# Patient Record
Sex: FEMALE | Race: WHITE | NOT HISPANIC OR LATINO | URBAN - METROPOLITAN AREA
[De-identification: names, ages, dates, MRNs, and addresses within clinical notes are randomized per-mention and may not be internally consistent; named-entity substitution may affect disease eponyms.]

---

## 2014-02-18 RX ORDER — HYDRALAZINE HCL 50 MG
1 TABLET ORAL
Qty: 0 | Refills: 0 | COMMUNITY
Start: 2014-02-18

## 2015-09-03 RX ORDER — SERTRALINE 25 MG/1
1 TABLET, FILM COATED ORAL
Qty: 0 | Refills: 0 | COMMUNITY
Start: 2015-09-03

## 2018-02-13 ENCOUNTER — INPATIENT (INPATIENT)
Facility: HOSPITAL | Age: 83
LOS: 11 days | End: 2018-02-25
Attending: INTERNAL MEDICINE | Admitting: INTERNAL MEDICINE
Payer: MEDICARE

## 2018-02-13 VITALS
TEMPERATURE: 98 F | SYSTOLIC BLOOD PRESSURE: 208 MMHG | RESPIRATION RATE: 20 BRPM | OXYGEN SATURATION: 95 % | HEART RATE: 101 BPM | DIASTOLIC BLOOD PRESSURE: 53 MMHG

## 2018-02-13 DIAGNOSIS — I10 ESSENTIAL (PRIMARY) HYPERTENSION: ICD-10-CM

## 2018-02-13 DIAGNOSIS — E78.5 HYPERLIPIDEMIA, UNSPECIFIED: ICD-10-CM

## 2018-02-13 DIAGNOSIS — R06.09 OTHER FORMS OF DYSPNEA: ICD-10-CM

## 2018-02-13 DIAGNOSIS — R33.9 RETENTION OF URINE, UNSPECIFIED: ICD-10-CM

## 2018-02-13 DIAGNOSIS — I50.9 HEART FAILURE, UNSPECIFIED: ICD-10-CM

## 2018-02-13 DIAGNOSIS — Z29.9 ENCOUNTER FOR PROPHYLACTIC MEASURES, UNSPECIFIED: ICD-10-CM

## 2018-02-13 LAB
ALBUMIN SERPL ELPH-MCNC: 3.5 G/DL — SIGNIFICANT CHANGE UP (ref 3.3–5)
ALP SERPL-CCNC: 106 U/L — SIGNIFICANT CHANGE UP (ref 40–120)
ALT FLD-CCNC: 22 U/L — SIGNIFICANT CHANGE UP (ref 4–33)
APPEARANCE UR: CLEAR — SIGNIFICANT CHANGE UP
AST SERPL-CCNC: 19 U/L — SIGNIFICANT CHANGE UP (ref 4–32)
BACTERIA # UR AUTO: SIGNIFICANT CHANGE UP
BASOPHILS # BLD AUTO: 0.12 K/UL — SIGNIFICANT CHANGE UP (ref 0–0.2)
BASOPHILS NFR BLD AUTO: 1 % — SIGNIFICANT CHANGE UP (ref 0–2)
BILIRUB SERPL-MCNC: 0.6 MG/DL — SIGNIFICANT CHANGE UP (ref 0.2–1.2)
BILIRUB UR-MCNC: NEGATIVE — SIGNIFICANT CHANGE UP
BLOOD UR QL VISUAL: NEGATIVE — SIGNIFICANT CHANGE UP
BUN SERPL-MCNC: 16 MG/DL — SIGNIFICANT CHANGE UP (ref 7–23)
CALCIUM SERPL-MCNC: 9 MG/DL — SIGNIFICANT CHANGE UP (ref 8.4–10.5)
CHLORIDE SERPL-SCNC: 108 MMOL/L — HIGH (ref 98–107)
CK MB BLD-MCNC: 1.14 NG/ML — SIGNIFICANT CHANGE UP (ref 1–4.7)
CK MB BLD-MCNC: SIGNIFICANT CHANGE UP (ref 0–2.5)
CK SERPL-CCNC: 21 U/L — LOW (ref 25–170)
CO2 SERPL-SCNC: 22 MMOL/L — SIGNIFICANT CHANGE UP (ref 22–31)
COLOR SPEC: SIGNIFICANT CHANGE UP
CREAT SERPL-MCNC: 1.04 MG/DL — SIGNIFICANT CHANGE UP (ref 0.5–1.3)
EOSINOPHIL # BLD AUTO: 0.13 K/UL — SIGNIFICANT CHANGE UP (ref 0–0.5)
EOSINOPHIL NFR BLD AUTO: 1.1 % — SIGNIFICANT CHANGE UP (ref 0–6)
GLUCOSE SERPL-MCNC: 135 MG/DL — HIGH (ref 70–99)
GLUCOSE UR-MCNC: NEGATIVE — SIGNIFICANT CHANGE UP
HCT VFR BLD CALC: 28.2 % — LOW (ref 34.5–45)
HGB BLD-MCNC: 9.7 G/DL — LOW (ref 11.5–15.5)
IMM GRANULOCYTES # BLD AUTO: 0.07 # — SIGNIFICANT CHANGE UP
IMM GRANULOCYTES NFR BLD AUTO: 0.6 % — SIGNIFICANT CHANGE UP (ref 0–1.5)
KETONES UR-MCNC: NEGATIVE — SIGNIFICANT CHANGE UP
LEUKOCYTE ESTERASE UR-ACNC: NEGATIVE — SIGNIFICANT CHANGE UP
LYMPHOCYTES # BLD AUTO: 0.81 K/UL — LOW (ref 1–3.3)
LYMPHOCYTES # BLD AUTO: 7 % — LOW (ref 13–44)
MCHC RBC-ENTMCNC: 32.3 PG — SIGNIFICANT CHANGE UP (ref 27–34)
MCHC RBC-ENTMCNC: 34.4 % — SIGNIFICANT CHANGE UP (ref 32–36)
MCV RBC AUTO: 94 FL — SIGNIFICANT CHANGE UP (ref 80–100)
MONOCYTES # BLD AUTO: 0.92 K/UL — HIGH (ref 0–0.9)
MONOCYTES NFR BLD AUTO: 8 % — SIGNIFICANT CHANGE UP (ref 2–14)
MUCOUS THREADS # UR AUTO: SIGNIFICANT CHANGE UP
NEUTROPHILS # BLD AUTO: 9.52 K/UL — HIGH (ref 1.8–7.4)
NEUTROPHILS NFR BLD AUTO: 82.3 % — HIGH (ref 43–77)
NITRITE UR-MCNC: NEGATIVE — SIGNIFICANT CHANGE UP
NRBC # FLD: 0 — SIGNIFICANT CHANGE UP
NT-PROBNP SERPL-SCNC: 4838 PG/ML — SIGNIFICANT CHANGE UP
PH UR: 6.5 — SIGNIFICANT CHANGE UP (ref 4.6–8)
PLATELET # BLD AUTO: 299 K/UL — SIGNIFICANT CHANGE UP (ref 150–400)
PMV BLD: 10.3 FL — SIGNIFICANT CHANGE UP (ref 7–13)
POTASSIUM SERPL-MCNC: 4.3 MMOL/L — SIGNIFICANT CHANGE UP (ref 3.5–5.3)
POTASSIUM SERPL-SCNC: 4.3 MMOL/L — SIGNIFICANT CHANGE UP (ref 3.5–5.3)
PROT SERPL-MCNC: 6.6 G/DL — SIGNIFICANT CHANGE UP (ref 6–8.3)
PROT UR-MCNC: 300 MG/DL — HIGH
RBC # BLD: 3 M/UL — LOW (ref 3.8–5.2)
RBC # FLD: 13.2 % — SIGNIFICANT CHANGE UP (ref 10.3–14.5)
RBC CASTS # UR COMP ASSIST: SIGNIFICANT CHANGE UP (ref 0–?)
SODIUM SERPL-SCNC: 144 MMOL/L — SIGNIFICANT CHANGE UP (ref 135–145)
SP GR SPEC: 1.01 — SIGNIFICANT CHANGE UP (ref 1–1.04)
SQUAMOUS # UR AUTO: SIGNIFICANT CHANGE UP
TROPONIN T SERPL-MCNC: < 0.06 NG/ML — SIGNIFICANT CHANGE UP (ref 0–0.06)
UROBILINOGEN FLD QL: NORMAL MG/DL — SIGNIFICANT CHANGE UP
WBC # BLD: 11.57 K/UL — HIGH (ref 3.8–10.5)
WBC # FLD AUTO: 11.57 K/UL — HIGH (ref 3.8–10.5)
WBC UR QL: SIGNIFICANT CHANGE UP (ref 0–?)

## 2018-02-13 PROCEDURE — 71046 X-RAY EXAM CHEST 2 VIEWS: CPT | Mod: 26

## 2018-02-13 RX ORDER — CHOLECALCIFEROL (VITAMIN D3) 125 MCG
1000 CAPSULE ORAL DAILY
Qty: 0 | Refills: 0 | Status: DISCONTINUED | OUTPATIENT
Start: 2018-02-13 | End: 2018-02-25

## 2018-02-13 RX ORDER — FUROSEMIDE 40 MG
80 TABLET ORAL ONCE
Qty: 0 | Refills: 0 | Status: COMPLETED | OUTPATIENT
Start: 2018-02-13 | End: 2018-02-13

## 2018-02-13 RX ORDER — HEPARIN SODIUM 5000 [USP'U]/ML
5000 INJECTION INTRAVENOUS; SUBCUTANEOUS EVERY 12 HOURS
Qty: 0 | Refills: 0 | Status: DISCONTINUED | OUTPATIENT
Start: 2018-02-13 | End: 2018-02-25

## 2018-02-13 RX ORDER — ATORVASTATIN CALCIUM 80 MG/1
20 TABLET, FILM COATED ORAL AT BEDTIME
Qty: 0 | Refills: 0 | Status: DISCONTINUED | OUTPATIENT
Start: 2018-02-13 | End: 2018-02-25

## 2018-02-13 RX ORDER — CINACALCET 30 MG/1
30 TABLET, FILM COATED ORAL
Qty: 0 | Refills: 0 | Status: DISCONTINUED | OUTPATIENT
Start: 2018-02-13 | End: 2018-02-25

## 2018-02-13 RX ORDER — AMLODIPINE BESYLATE 2.5 MG/1
10 TABLET ORAL DAILY
Qty: 0 | Refills: 0 | Status: DISCONTINUED | OUTPATIENT
Start: 2018-02-13 | End: 2018-02-25

## 2018-02-13 RX ORDER — CLOPIDOGREL BISULFATE 75 MG/1
75 TABLET, FILM COATED ORAL DAILY
Qty: 0 | Refills: 0 | Status: DISCONTINUED | OUTPATIENT
Start: 2018-02-13 | End: 2018-02-21

## 2018-02-13 RX ORDER — PANTOPRAZOLE SODIUM 20 MG/1
40 TABLET, DELAYED RELEASE ORAL
Qty: 0 | Refills: 0 | Status: DISCONTINUED | OUTPATIENT
Start: 2018-02-13 | End: 2018-02-25

## 2018-02-13 RX ORDER — FUROSEMIDE 40 MG
1 TABLET ORAL
Qty: 0 | Refills: 0 | COMMUNITY

## 2018-02-13 RX ORDER — ALBUTEROL 90 UG/1
2 AEROSOL, METERED ORAL EVERY 6 HOURS
Qty: 0 | Refills: 0 | Status: DISCONTINUED | OUTPATIENT
Start: 2018-02-13 | End: 2018-02-25

## 2018-02-13 RX ORDER — FERROUS SULFATE 325(65) MG
325 TABLET ORAL DAILY
Qty: 0 | Refills: 0 | Status: DISCONTINUED | OUTPATIENT
Start: 2018-02-13 | End: 2018-02-25

## 2018-02-13 RX ORDER — CHOLECALCIFEROL (VITAMIN D3) 125 MCG
1 CAPSULE ORAL
Qty: 0 | Refills: 0 | COMMUNITY

## 2018-02-13 RX ORDER — SERTRALINE 25 MG/1
100 TABLET, FILM COATED ORAL DAILY
Qty: 0 | Refills: 0 | Status: DISCONTINUED | OUTPATIENT
Start: 2018-02-13 | End: 2018-02-25

## 2018-02-13 RX ORDER — HYDRALAZINE HCL 50 MG
25 TABLET ORAL
Qty: 0 | Refills: 0 | Status: DISCONTINUED | OUTPATIENT
Start: 2018-02-13 | End: 2018-02-18

## 2018-02-13 RX ORDER — LOSARTAN POTASSIUM 100 MG/1
50 TABLET, FILM COATED ORAL DAILY
Qty: 0 | Refills: 0 | Status: DISCONTINUED | OUTPATIENT
Start: 2018-02-13 | End: 2018-02-16

## 2018-02-13 RX ORDER — FUROSEMIDE 40 MG
40 TABLET ORAL
Qty: 0 | Refills: 0 | Status: DISCONTINUED | OUTPATIENT
Start: 2018-02-13 | End: 2018-02-15

## 2018-02-13 RX ORDER — CINACALCET 30 MG/1
1 TABLET, FILM COATED ORAL
Qty: 0 | Refills: 0 | COMMUNITY

## 2018-02-13 RX ORDER — FERROUS SULFATE 325(65) MG
1 TABLET ORAL
Qty: 0 | Refills: 0 | COMMUNITY

## 2018-02-13 RX ORDER — METOPROLOL TARTRATE 50 MG
1 TABLET ORAL
Qty: 0 | Refills: 0 | COMMUNITY

## 2018-02-13 RX ORDER — METOPROLOL TARTRATE 50 MG
25 TABLET ORAL
Qty: 0 | Refills: 0 | Status: DISCONTINUED | OUTPATIENT
Start: 2018-02-13 | End: 2018-02-15

## 2018-02-13 RX ORDER — ASPIRIN/CALCIUM CARB/MAGNESIUM 324 MG
81 TABLET ORAL DAILY
Qty: 0 | Refills: 0 | Status: DISCONTINUED | OUTPATIENT
Start: 2018-02-13 | End: 2018-02-21

## 2018-02-13 RX ADMIN — ATORVASTATIN CALCIUM 20 MILLIGRAM(S): 80 TABLET, FILM COATED ORAL at 22:15

## 2018-02-13 RX ADMIN — Medication 80 MILLIGRAM(S): at 18:47

## 2018-02-13 NOTE — H&P ADULT - PROBLEM SELECTOR PLAN 1
Admit to telemetry.   Strict I and O, daily weights.   Lasix 40 mg IV BID.   TTE ordered.   check cbc,tsh,lipid, hemoglobin a1c, bmp with mag and phos.   f/u MD note

## 2018-02-13 NOTE — ED PROVIDER NOTE - SHIFT CHANGE DETAILS
I have signed over this patient to the above attending physician. Pertinent history, physical exam findings and workup thus far in the ED have been discussed. The pending tests and plan, including u/a, labs, cxr and reassessment were signed over.  All questions from the above attending physician have been answered.

## 2018-02-13 NOTE — H&P ADULT - PROBLEM SELECTOR PLAN 2
Patient states she has urinated normally since she had gotten the lasix IV.   Continue to monitor I and O and renal function.

## 2018-02-13 NOTE — H&P ADULT - NSHPPHYSICALEXAM_GEN_ALL_CORE
GENERAL APPEARANCE: Well developed, well nourished, alert and cooperative, and appears to be in no acute distress.  HEAD: normocephalic.  EYES: PERRL, EOMI.   EARS: External auditory canals clear, hearing grossly intact.  NECK: Neck supple, non-tender without lymphadenopathy, masses or thyromegaly.  CARDIAC: Normal S1 and S2. No S3, S4 or murmurs. Rhythm is regular.   LUNGS: + rales bilaterally. Auscultation without rhonchi, wheezing or diminished breath sounds.  ABDOMEN: Positive bowel sounds. Soft, nondistended, nontender. No guarding or rebound. No masses.  EXTREMITIES: No significant deformity or joint abnormality. No edema. Peripheral pulses intact.   NEUROLOGICAL: Strength and sensation symmetric and intact throughout.   SKIN: Skin normal color, texture and turgor with no lesions or eruptions.  PSYCHIATRIC: The mental examination revealed the patient was oriented to person, place, and time. The patient was able to demonstrate good judgement and reason, without hallucinations, abnormal affect or abnormal behaviors during the examination. Patient is not suicidal.

## 2018-02-13 NOTE — ED PROVIDER NOTE - PROGRESS NOTE DETAILS
Guszack: ~200ml UOP w/ no evidence of urinary infection. Creatinine WNL. Noted small bilateral pleural effusions with increased interstitial edema. Dyspnea on exertion likely secondary to underdiuresis. Will give IV lasix. Pt amenable to admission. Does not have a primary cardiologist, will admit to tele DOD.

## 2018-02-13 NOTE — ED ADULT TRIAGE NOTE - CHIEF COMPLAINT QUOTE
pt BIBA from home, pt c/o urinary retention x 5 days.  pt reports she has urine dribbling out of her

## 2018-02-13 NOTE — ED ADULT NURSE NOTE - OBJECTIVE STATEMENT
Pt a&ox3 c/o urinary retention for the past week, dribbling urine. Pt denies sob breathing even and unlabored. Pt denies cp/discomfort, denies headache/dizziness. Abdomen soft, tender, non-distended. Skin is cool dry and intact. MD at bedside. Will continue to monitor.

## 2018-02-13 NOTE — H&P ADULT - NSHPLABSRESULTS_GEN_ALL_CORE
LABS:                        9.7    11.57 )-----------( 299      ( 2018 14:59 )             28.2     02-13    144  |  108<H>  |  16  ----------------------------<  135<H>  4.3   |  22  |  1.04    Ca    9.0      2018 14:59    TPro  6.6  /  Alb  3.5  /  TBili  0.6  /  DBili  x   /  AST  19  /  ALT  22  /  AlkPhos  106  02-13      Urinalysis Basic - ( 2018 16:04 )    Color: PLYEL / Appearance: CLEAR / S.013 / pH: 6.5  Gluc: NEGATIVE / Ketone: NEGATIVE  / Bili: NEGATIVE / Urobili: NORMAL mg/dL   Blood: NEGATIVE / Protein: 300 mg/dL / Nitrite: NEGATIVE   Leuk Esterase: NEGATIVE / RBC: 0-2 / WBC 2-5   Sq Epi: OCC / Non Sq Epi: x / Bacteria: FEW      CAPILLARY BLOOD GLUCOSE            Urinalysis Basic - ( 2018 16:04 )    Color: PLYEL / Appearance: CLEAR / S.013 / pH: 6.5  Gluc: NEGATIVE / Ketone: NEGATIVE  / Bili: NEGATIVE / Urobili: NORMAL mg/dL   Blood: NEGATIVE / Protein: 300 mg/dL / Nitrite: NEGATIVE   Leuk Esterase: NEGATIVE / RBC: 0-2 / WBC 2-5   Sq Epi: OCC / Non Sq Epi: x / Bacteria: FEW      EKG shows NSR @ 73 bpm  early rpolar. unchanged from prior.

## 2018-02-13 NOTE — H&P ADULT - ATTENDING COMMENTS
Patient seen and examined, agree with the above assessment and plan by PA above  Pt with prior history of HFpEF, CKD  Prior ECHO reveals mild aortic stenosis/regurgitation w hyperdynamic LV w significant intracavitary gradient  Pt presenting with worsening dyspnea after decreasing her lasix dose based on her PMD recs  Exam reveals mild CHF, mumur consistent w at least moderate AS  Plan to continue IV lasix  ECHO to eval AV  Pulm eval  Renal eval  med evak

## 2018-02-13 NOTE — ED ADULT NURSE NOTE - PMH
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

## 2018-02-13 NOTE — H&P ADULT - ASSESSMENT
85 y/o F with h/o COPD, CHF, HTN, HLD, DM, breast Ca s/p left lumpectomy and RT, diverticulosis presents to the ED for shortness of breath. Admit to telemetry.

## 2018-02-13 NOTE — ED PROVIDER NOTE - OBJECTIVE STATEMENT
84yof w/ COPD, HTN, CHF, DM2 p/w 5 days of trickling urine. Gradual onset of symptoms about 5 days ago, noticed that she was only passing a small dribble of urine. Denies any dysuria or hematuria, feels like voiding completely having urinary frequency. Endorses having increasing shortness of breath requiring increased oxygen at home, as well as intermittent nausea and lightheadedness. Has hx of UTIs but never has had retention. Furosemide dosing decreased a few months ago. No fevers or other infectious symptoms.

## 2018-02-13 NOTE — ED PROVIDER NOTE - ATTENDING CONTRIBUTION TO CARE
Dolly: 85 yo female c/o 5 days of worsening urinary frequency and possible retention. Pt reports that she has the urge to urinate but is only able to pass a small amount of urine. No associated abdominal pain, flank pain, dysuria, hematuria, fevers or chills. Exam; well appearing, MMM, abdomen is soft and nontender, NO CVA TTP. cardiac and lung exam unremarkable. A/P- 85 yo female with urinary retention vs UTI will obtain labs, urine, bladder scan and reassess. Dolly: 85 yo female c/o 5 days of worsening urinary frequency and possible retention. Pt reports that she has the urge to urinate but is only able to pass a small amount of urine. No associated abdominal pain, flank pain, dysuria, hematuria, fevers or chills. Exam; well appearing, MMM, abdomen is soft and nontender, NO CVA TTP. cardiac exam unremarkable, +bibasilar crackles. A/P- 85 yo female with urinary retention vs UTI will obtain labs, urine, bladder scan and reassess.

## 2018-02-13 NOTE — ED PROVIDER NOTE - MEDICAL DECISION MAKING DETAILS
84yof w/ CHF, CKD? p/w decreased urination, increasing dyspnea on exertion, urinary retention vs worsening CKD. Will straight cath for residual, r/o UTI, and check kidney function. Has symmetric crackles on exam, likely mild pulmonary edema from volume overload. Pending labs may need diuresis.

## 2018-02-14 DIAGNOSIS — I10 ESSENTIAL (PRIMARY) HYPERTENSION: ICD-10-CM

## 2018-02-14 DIAGNOSIS — R80.8 OTHER PROTEINURIA: ICD-10-CM

## 2018-02-14 DIAGNOSIS — N18.3 CHRONIC KIDNEY DISEASE, STAGE 3 (MODERATE): ICD-10-CM

## 2018-02-14 DIAGNOSIS — J44.9 CHRONIC OBSTRUCTIVE PULMONARY DISEASE, UNSPECIFIED: ICD-10-CM

## 2018-02-14 DIAGNOSIS — R60.0 LOCALIZED EDEMA: ICD-10-CM

## 2018-02-14 LAB
BUN SERPL-MCNC: 19 MG/DL — SIGNIFICANT CHANGE UP (ref 7–23)
CALCIUM SERPL-MCNC: 9 MG/DL — SIGNIFICANT CHANGE UP (ref 8.4–10.5)
CHLORIDE SERPL-SCNC: 107 MMOL/L — SIGNIFICANT CHANGE UP (ref 98–107)
CHOLEST SERPL-MCNC: 159 MG/DL — SIGNIFICANT CHANGE UP (ref 120–199)
CO2 SERPL-SCNC: 21 MMOL/L — LOW (ref 22–31)
CREAT ?TM UR-MCNC: 43.37 MG/DL — SIGNIFICANT CHANGE UP
CREAT SERPL-MCNC: 1.21 MG/DL — SIGNIFICANT CHANGE UP (ref 0.5–1.3)
GLUCOSE SERPL-MCNC: 79 MG/DL — SIGNIFICANT CHANGE UP (ref 70–99)
HBA1C BLD-MCNC: 5.4 % — SIGNIFICANT CHANGE UP (ref 4–5.6)
HCT VFR BLD CALC: 28.8 % — LOW (ref 34.5–45)
HDLC SERPL-MCNC: 49 MG/DL — SIGNIFICANT CHANGE UP (ref 45–65)
HGB BLD-MCNC: 9.7 G/DL — LOW (ref 11.5–15.5)
LIPID PNL WITH DIRECT LDL SERPL: 92 MG/DL — SIGNIFICANT CHANGE UP
MAGNESIUM SERPL-MCNC: 1.6 MG/DL — SIGNIFICANT CHANGE UP (ref 1.6–2.6)
MCHC RBC-ENTMCNC: 31.7 PG — SIGNIFICANT CHANGE UP (ref 27–34)
MCHC RBC-ENTMCNC: 33.7 % — SIGNIFICANT CHANGE UP (ref 32–36)
MCV RBC AUTO: 94.1 FL — SIGNIFICANT CHANGE UP (ref 80–100)
NRBC # FLD: 0 — SIGNIFICANT CHANGE UP
PHOSPHATE SERPL-MCNC: 3 MG/DL — SIGNIFICANT CHANGE UP (ref 2.5–4.5)
PLATELET # BLD AUTO: 297 K/UL — SIGNIFICANT CHANGE UP (ref 150–400)
PMV BLD: 10.3 FL — SIGNIFICANT CHANGE UP (ref 7–13)
POTASSIUM SERPL-MCNC: 3.9 MMOL/L — SIGNIFICANT CHANGE UP (ref 3.5–5.3)
POTASSIUM SERPL-SCNC: 3.9 MMOL/L — SIGNIFICANT CHANGE UP (ref 3.5–5.3)
PROT UR-MCNC: 45.7 MG/DL — SIGNIFICANT CHANGE UP
RBC # BLD: 3.06 M/UL — LOW (ref 3.8–5.2)
RBC # FLD: 13.1 % — SIGNIFICANT CHANGE UP (ref 10.3–14.5)
SODIUM SERPL-SCNC: 144 MMOL/L — SIGNIFICANT CHANGE UP (ref 135–145)
TRIGL SERPL-MCNC: 87 MG/DL — SIGNIFICANT CHANGE UP (ref 10–149)
TSH SERPL-MCNC: 1.98 UIU/ML — SIGNIFICANT CHANGE UP (ref 0.27–4.2)
WBC # BLD: 9.78 K/UL — SIGNIFICANT CHANGE UP (ref 3.8–10.5)
WBC # FLD AUTO: 9.78 K/UL — SIGNIFICANT CHANGE UP (ref 3.8–10.5)

## 2018-02-14 PROCEDURE — 71250 CT THORAX DX C-: CPT | Mod: 26

## 2018-02-14 RX ORDER — BUDESONIDE AND FORMOTEROL FUMARATE DIHYDRATE 160; 4.5 UG/1; UG/1
2 AEROSOL RESPIRATORY (INHALATION)
Qty: 0 | Refills: 0 | Status: DISCONTINUED | OUTPATIENT
Start: 2018-02-14 | End: 2018-02-25

## 2018-02-14 RX ORDER — ACETAMINOPHEN 500 MG
650 TABLET ORAL EVERY 6 HOURS
Qty: 0 | Refills: 0 | Status: DISCONTINUED | OUTPATIENT
Start: 2018-02-14 | End: 2018-02-22

## 2018-02-14 RX ADMIN — ATORVASTATIN CALCIUM 20 MILLIGRAM(S): 80 TABLET, FILM COATED ORAL at 23:01

## 2018-02-14 RX ADMIN — Medication 650 MILLIGRAM(S): at 23:01

## 2018-02-14 RX ADMIN — PANTOPRAZOLE SODIUM 40 MILLIGRAM(S): 20 TABLET, DELAYED RELEASE ORAL at 06:16

## 2018-02-14 RX ADMIN — Medication 81 MILLIGRAM(S): at 11:42

## 2018-02-14 RX ADMIN — Medication 25 MILLIGRAM(S): at 05:18

## 2018-02-14 RX ADMIN — LOSARTAN POTASSIUM 50 MILLIGRAM(S): 100 TABLET, FILM COATED ORAL at 05:18

## 2018-02-14 RX ADMIN — Medication 40 MILLIGRAM(S): at 05:18

## 2018-02-14 RX ADMIN — CLOPIDOGREL BISULFATE 75 MILLIGRAM(S): 75 TABLET, FILM COATED ORAL at 11:43

## 2018-02-14 RX ADMIN — Medication 40 MILLIGRAM(S): at 18:19

## 2018-02-14 RX ADMIN — Medication 1000 UNIT(S): at 11:43

## 2018-02-14 RX ADMIN — HEPARIN SODIUM 5000 UNIT(S): 5000 INJECTION INTRAVENOUS; SUBCUTANEOUS at 05:18

## 2018-02-14 RX ADMIN — Medication 25 MILLIGRAM(S): at 11:42

## 2018-02-14 RX ADMIN — Medication 325 MILLIGRAM(S): at 11:43

## 2018-02-14 RX ADMIN — Medication 25 MILLIGRAM(S): at 18:19

## 2018-02-14 RX ADMIN — SERTRALINE 100 MILLIGRAM(S): 25 TABLET, FILM COATED ORAL at 11:42

## 2018-02-14 RX ADMIN — HEPARIN SODIUM 5000 UNIT(S): 5000 INJECTION INTRAVENOUS; SUBCUTANEOUS at 18:15

## 2018-02-14 RX ADMIN — AMLODIPINE BESYLATE 10 MILLIGRAM(S): 2.5 TABLET ORAL at 05:18

## 2018-02-14 RX ADMIN — Medication 1 TABLET(S): at 11:43

## 2018-02-14 NOTE — PHYSICAL THERAPY INITIAL EVALUATION ADULT - ADDITIONAL COMMENTS
Pt. reports owning DME of rollator, home O2.    Pt. was left supine in bed, NAD, call bell within reach, all lines intact. RN Haley aware of pt. participation in PT.

## 2018-02-14 NOTE — PHYSICAL THERAPY INITIAL EVALUATION ADULT - PERTINENT HX OF CURRENT PROBLEM, REHAB EVAL
Pt. admitted for urinary retention, SOB. CHF. PMH of COPD on home O2, HTN, CHF, DM, HLD, diverticulosis, breast ca s/p lumpectomy.

## 2018-02-14 NOTE — PHYSICAL THERAPY INITIAL EVALUATION ADULT - CRITERIA FOR SKILLED THERAPEUTIC INTERVENTIONS
impairments found/rehab potential/therapy frequency/functional limitations in following categories/anticipated equipment needs at discharge/anticipated discharge recommendation/predicted duration of therapy intervention

## 2018-02-14 NOTE — CONSULT NOTE ADULT - SUBJECTIVE AND OBJECTIVE BOX
HPI: 85 y/o F with h/o COPD on 3L of O2 at home, HTN, CHF, DM, HLD, diverticulosis, breast ca s/p lumpectomy and RT, presents to the ED for urinary retention X 5 days and shortness of breath. Pt states she has been dribbling her urine for the past 5 days. Pt states she has been going to urinate more often. Pt also states she has shortness of breath with exertion. Pt states she had to decrease her lasix dose due to worsening renal function. Pt denies LOC, syncope,fever chillls, N/V/D/C, numbness, tingling, chest pain, palpitations, lightheadness, dizziness, dysuria, urinary/bowel incontinence or any other complaints at this time. 	      Allergies:  lisinopril (Anaphylaxis)  penicillin (Anaphylaxis)      PAST MEDICAL & SURGICAL HISTORY:  Congestive heart failure (CHF)  Pulmonary hypertension  Emphysema of lung  Femoral Artery Thrombosis: partiac oclusion with no inteventions as per Pt on ASA Plavix  History of Colonoscopy with Polypectomy  History of Hysterectomy  Diverticulosis of Colon  Breast Cancer: S/P Lt lumpectomy &amp; RT  Dyslipidemia  DM (Diabetes Mellitus)  HTN (Hypertension)  COPD (Chronic Obstructive Pulmonary Disease)  History of tonsillectomy  S/P lumpectomy, left breast: 1998  H/O total hysterectomy: 1999      FAMILY HISTORY:  No pertinent family history in first degree relatives      REVIEW OF SYSTEMS:  CONSTITUTIONAL: No fever, weight loss, or fatigue  EYES: No eye pain, visual disturbances, or discharge  NECK: No pain or stiffness  RESPIRATORY: No cough or wheezing, no shortness of breath  CARDIOVASCULAR: No chest pain, palpitations, dizziness, or leg swelling  GASTROINTESTINAL: No abdominal or epigastric pain. No nausea, vomiting, diarrhea or constipation  GENITOURINARY: No dysuria, urinary frequency or urgency, no hematuria  NEUROLOGICAL: No headaches, memory loss, loss of strength, numbness, or tremors  SKIN: No itching, burning, rashes, or lesions   MUSCULOSKELETAL: No joint pain or swelling; No muscle, back, or extremity pain    Medications:  MEDICATIONS  (STANDING):  amLODIPine   Tablet 10 milliGRAM(s) Oral daily  aspirin enteric coated 81 milliGRAM(s) Oral daily  atorvastatin 20 milliGRAM(s) Oral at bedtime  buDESOnide 160 MICROgram(s)/formoterol 4.5 MICROgram(s) Inhaler 2 Puff(s) Inhalation two times a day  cholecalciferol 1000 Unit(s) Oral daily  cinacalcet 30 milliGRAM(s) Oral <User Schedule>  clopidogrel Tablet 75 milliGRAM(s) Oral daily  ferrous    sulfate 325 milliGRAM(s) Oral daily  furosemide   Injectable 40 milliGRAM(s) IV Push two times a day  heparin  Injectable 5000 Unit(s) SubCutaneous every 12 hours  hydrALAZINE 25 milliGRAM(s) Oral four times a day  losartan 50 milliGRAM(s) Oral daily  metoprolol     tartrate 25 milliGRAM(s) Oral two times a day  multivitamin 1 Tablet(s) Oral daily  pantoprazole    Tablet 40 milliGRAM(s) Oral before breakfast  sertraline 100 milliGRAM(s) Oral daily    MEDICATIONS  (PRN):  ALBUTerol    90 MICROgram(s) HFA Inhaler 2 Puff(s) Inhalation every 6 hours PRN Bronchospasm    	    PHYSICAL EXAM:  T(C): 36.8 (02-14-18 @ 06:44), Max: 36.9 (02-13-18 @ 14:37)  HR: 64 (02-14-18 @ 11:38) (61 - 105)  BP: 150/56 (02-14-18 @ 11:38) (144/50 - 235/52)  RR: 18 (02-14-18 @ 11:38) (16 - 20)  SpO2: 100% (02-14-18 @ 11:38) (95% - 100%)  Wt(kg): --  I&O's Summary    13 Feb 2018 07:01  -  14 Feb 2018 07:00  --------------------------------------------------------  IN: 120 mL / OUT: 0 mL / NET: 120 mL        Appearance: Normal	  HEENT:   NCAT, PERRL, EOMI	  Lymphatic: No lymphadenopathy  Cardiovascular: Normal S1 S2, RRR  Respiratory: Lungs clear to auscultation BL  Psychiatry: A & O x 3, Mood & affect appropriate  Gastrointestinal:  Soft, Non-tender, + BS  Skin: No rashes, No ecchymoses, No cyanosis	  Neurologic: Non-focal  Extremities: Normal range of motion, No clubbing, cyanosis or edema    	  LABS:	 	    CARDIAC MARKERS:  CARDIAC MARKERS ( 13 Feb 2018 14:59 )  x     / < 0.06 ng/mL / 21 u/L / 1.14 ng/mL / x                                    9.7    9.78  )-----------( 297      ( 14 Feb 2018 05:31 )             28.8     02-14    144  |  107  |  19  ----------------------------<  79  3.9   |  21<L>  |  1.21    Ca    9.0      14 Feb 2018 05:31  Phos  3.0     02-14  Mg     1.6     02-14    TPro  6.6  /  Alb  3.5  /  TBili  0.6  /  DBili  x   /  AST  19  /  ALT  22  /  AlkPhos  106  02-13    proBNP: Serum Pro-Brain Natriuretic Peptide: 4838 pg/mL (02-13 @ 14:59)    Lipid Profile:   HgA1c: Hemoglobin A1C, Whole Blood: 5.4 % (02-14 @ 05:31)    TSH: Thyroid Stimulating Hormone, Serum: 1.98 uIU/mL (02-14 @ 05:31)

## 2018-02-14 NOTE — CONSULT NOTE ADULT - PROBLEM SELECTOR RECOMMENDATION 2
chest x-ray wise, and clinically also, she seems to be in CHF , especially also she had decreased her lasix at home fearing her kidney functions: Cont toya management per Cardiology :

## 2018-02-14 NOTE — CONSULT NOTE ADULT - PROBLEM SELECTOR RECOMMENDATION 2
Patient with significant proteinuria on UA dating as far back as 2015. Suspect due to DM as patient admits to h/o DM for 20 years which has resolved after significant weight loss. Check spot urine TP/CR to quantify proteinuria. Check Hep B, C and paraproteinemia work up to rule out alternative secondary etiologies. Continue with losartan for anti-proteinuric effect.

## 2018-02-14 NOTE — CONSULT NOTE ADULT - SUBJECTIVE AND OBJECTIVE BOX
[facility] NEPHROLOGY- CONSULTATION NOTE    84 female with history of below presents with SOB. Nephrology consulted for proteinuria.    Patient denies any prior history of proteinuria but as per UA in , patient with significant proteinuria at that time. Patient admits to h/o hepatitis? and PRBC transfusion in the past. Patient denies any h/o HIV, tattoos, IVDA, herbal medication use or chronic NSAID use. Patient had one episode of microscopic hematuria in the past but unclear if secondary to infection and states she did not get further work up. Patient denies any rashes, joint pains, foamy urine.    Patient with h/o DM for approximately 20 years for which she had been on medications in the past but underwent significant weight loss after the death of her daughter for which she was taken off medications. Patient denies any retinopathy but admits to neuropathy.    Patient had been on lasix as outpatient which was decreased to 3X/week due to kidney failure in the past.    REVIEW OF SYSTEMS:  Gen: no changes in weight  HEENT: no rhinorrhea  Neck: no sore throat  Cards: no chest pain  Resp: + dyspnea  GI: no nausea or vomiting or diarrhea  : no dysuria or hematuria  Vascular: no LE edema  Derm: no rashes  Neuro: + numbness/tingling in UE/LE    lisinopril (Anaphylaxis)  penicillin (Anaphylaxis)      Home Medications Reviewed  Hospital Medications:   MEDICATIONS  (STANDING):  amLODIPine   Tablet 10 milliGRAM(s) Oral daily  aspirin enteric coated 81 milliGRAM(s) Oral daily  atorvastatin 20 milliGRAM(s) Oral at bedtime  cholecalciferol 1000 Unit(s) Oral daily  cinacalcet 30 milliGRAM(s) Oral <User Schedule>  clopidogrel Tablet 75 milliGRAM(s) Oral daily  ferrous    sulfate 325 milliGRAM(s) Oral daily  furosemide   Injectable 40 milliGRAM(s) IV Push two times a day  heparin  Injectable 5000 Unit(s) SubCutaneous every 12 hours  hydrALAZINE 25 milliGRAM(s) Oral four times a day  losartan 50 milliGRAM(s) Oral daily  metoprolol     tartrate 25 milliGRAM(s) Oral two times a day  multivitamin 1 Tablet(s) Oral daily  pantoprazole    Tablet 40 milliGRAM(s) Oral before breakfast  sertraline 100 milliGRAM(s) Oral daily      PAST MEDICAL & SURGICAL HISTORY:  Congestive heart failure (CHF)  Pulmonary hypertension  Emphysema of lung  Femoral Artery Thrombosis: partiac oclusion with no inteventions as per Pt on ASA Plavix  History of Colonoscopy with Polypectomy  History of Hysterectomy  Diverticulosis of Colon  Breast Cancer: S/P Lt lumpectomy &amp; RT  Dyslipidemia  DM (Diabetes Mellitus)  HTN (Hypertension)  COPD (Chronic Obstructive Pulmonary Disease)  History of tonsillectomy  S/P lumpectomy, left breast:   H/O total hysterectomy:       FAMILY HISTORY:  No pertinent family history in first degree relatives      SOCIAL HISTORY:  Denies toxic substance use     VITALS:  T(F): 98.2 (18 @ 06:44), Max: 98.4 (18 @ 14:37)  HR: 64 (18 @ 07:08)  BP: 144/50 (18 @ 07:08)  RR: 16 (18 @ 06:44)  SpO2: 100% (18 @ 06:44)  Wt(kg): --     @ 07:01  -   @ 07:00  --------------------------------------------------------  IN: 120 mL / OUT: 0 mL / NET: 120 mL      Height (cm): 152.4 ( @ 03:00)  Weight (kg): 55.1 ( @ 03:00)  BMI (kg/m2): 23.7 ( @ 03:00)  BSA (m2): 1.51 ( @ 03:00)    PHYSICAL EXAM:  Gen: NAD, calm  HEENT: MMM  Neck: no JVD  Cards: RRR, +S1/S2, + BENNIE  Resp: CTA B/L  GI: soft, NT, + ab distention, NABS  : no CVA tenderness  Vascular: no LE edema B/L  Derm: no rashes  Neuro: non-focal    LABS:      144  |  107  |  19  ----------------------------<  79  3.9   |  21<L>  |  1.21    Ca    9.0      2018 05:31  Phos  3.0     02-14  Mg     1.6     02-14    TPro  6.6  /  Alb  3.5  /  TBili  0.6  /  DBili      /  AST  19  /  ALT  22  /  AlkPhos  106  02-13    Creatinine Trend: 1.21 <--, 1.04 <--                        9.7    9.78  )-----------( 297      ( 2018 05:31 )             28.8     Urine Studies:  Urinalysis Basic - ( 2018 16:04 )    Color: PLYEL / Appearance: CLEAR / S.013 / pH: 6.5  Gluc: NEGATIVE / Ketone: NEGATIVE  / Bili: NEGATIVE / Urobili: NORMAL mg/dL   Blood: NEGATIVE / Protein: 300 mg/dL / Nitrite: NEGATIVE   Leuk Esterase: NEGATIVE / RBC: 0-2 / WBC 2-5   Sq Epi: OCC / Non Sq Epi:  / Bacteria: FEW          RADIOLOGY & ADDITIONAL STUDIES:  < from: Xray Chest 2 Views PA/Lat (18 @ 16:54) >  IMPRESSION:  Mild pulmonary edema and small bilateral pleural effusions.    < end of copied text >

## 2018-02-14 NOTE — CONSULT NOTE ADULT - PROBLEM SELECTOR RECOMMENDATION 9
Doesno seem like COPD exacerbation: She is non compliant with inhalers: Would add Symbicort: I don't think there is any need for steroids at this time. Elliott allen

## 2018-02-14 NOTE — PHYSICAL THERAPY INITIAL EVALUATION ADULT - WEIGHT-BEARING RESTRICTIONS: STAND/SIT, REHAB EVAL
full weight-bearing
Breathing spontaneous and unlabored. Breath sounds clear and equal bilaterally with regular rhythm.

## 2018-02-14 NOTE — CONSULT NOTE ADULT - SUBJECTIVE AND OBJECTIVE BOX
I have seen and examined the patient and reviewed the history She has COPD and she was dced from [facility] few years ago with oxygen: She has multiple inhalers at home and do not remember what she takes: she does remember Symbicort but she takes it on an prn basis:    Patient is a 84y old  Female who presents with a chief complaint of urinary retention and shortness of breath (2018 21:54)      HPI:  83 y/o F with h/o COPD on 3L of O2 at home, HTN, CHF, DM, HLD, diverticulosis, breast ca s/p lumpectomy and RT, presents to the ED for urinary retention X 5 days and shortness of breath. Pt states she has been dribbling her urine for the past 5 days. Pt states she has been going to urinate more often. Pt also states she has shortness of breath with exertion. Pt states she had to decrease her lasix dose due to worsening renal function. Pt denies LOC, syncope,fever chillls, N/V/D/C, numbness, tingling, chest pain, palpitations, lightheadness, dizziness, dysuria, urinary/bowel incontinence or any other complaints at this time. 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 as per Pt on ASA Plavix  History of Colonoscopy with Polypectomy  History of Hysterectomy  Diverticulosis of Colon  Breast Cancer: S/P Lt lumpectomy &amp; RT  Dyslipidemia  DM (Diabetes Mellitus)  HTN (Hypertension)  COPD (Chronic Obstructive Pulmonary Disease)  History of tonsillectomy  S/P lumpectomy, left breast:   H/O total hysterectomy:       FAMILY HISTORY:  No pertinent family history in first degree relatives      Social History: [  40 pk yrs] TOBACCO                  [ x ] ETOH                                 [ x ] IVDA/DRUGS    REVIEW OF SYSTEMS      General:	x    Skin/Breast:x  	  Ophthalmologic:x  	  ENMT:	x    Respiratory and Thorax: SOB, REYES : NO FEVER  	  Cardiovascular:	X    Gastrointestinal:	X    Genitourinary:	X    Musculoskeletal:	X    Neurological:	X    Psychiatric:	X    Hematology/Lymphatics:	X    Endocrine:	  X  Allergic/Immunologic:	X    MEDICATIONS  (STANDING):  amLODIPine   Tablet 10 milliGRAM(s) Oral daily  aspirin enteric coated 81 milliGRAM(s) Oral daily  atorvastatin 20 milliGRAM(s) Oral at bedtime  cholecalciferol 1000 Unit(s) Oral daily  cinacalcet 30 milliGRAM(s) Oral <User Schedule>  clopidogrel Tablet 75 milliGRAM(s) Oral daily  ferrous    sulfate 325 milliGRAM(s) Oral daily  furosemide   Injectable 40 milliGRAM(s) IV Push two times a day  heparin  Injectable 5000 Unit(s) SubCutaneous every 12 hours  hydrALAZINE 25 milliGRAM(s) Oral four times a day  losartan 50 milliGRAM(s) Oral daily  metoprolol     tartrate 25 milliGRAM(s) Oral two times a day  multivitamin 1 Tablet(s) Oral daily  pantoprazole    Tablet 40 milliGRAM(s) Oral before breakfast  sertraline 100 milliGRAM(s) Oral daily    MEDICATIONS  (PRN):  ALBUTerol    90 MICROgram(s) HFA Inhaler 2 Puff(s) Inhalation every 6 hours PRN Bronchospasm       Vital Signs Last 24 Hrs  T(C): 36.8 (2018 06:44), Max: 36.9 (2018 14:37)  T(F): 98.2 (2018 06:44), Max: 98.4 (2018 14:37)  HR: 64 (2018 11:38) (61 - 105)  BP: 150/56 (2018 11:38) (144/50 - 235/52)  BP(mean): --  RR: 18 (2018 11:38) (16 - 20)  SpO2: 100% (2018 11:38) (95% - 100%)        I&O's Summary    2018 07:01  -  2018 07:00  --------------------------------------------------------  IN: 120 mL / OUT: 0 mL / NET: 120 mL        Physical Exam:   GENERAL: NAD, well-groomed, well-developed  HEENT: VICTORIANO/   Atraumatic, Normocephalic  ENMT: No tonsillar erythema, exudates, or enlargement; Moist mucous membranes, Good dentition, No lesions  NECK: Supple, No JVD, Normal thyroid  CHEST/LUNG: POOR AIR ENTRY : NO WHEEZING   CVS: Regular rate and rhythm; No murmurs, rubs, or gallops  GI: : Soft, Nontender, Nondistended; Bowel sounds present  NERVOUS SYSTEM:  Alert & Oriented X3  EXTREMITIES:  2+ Peripheral Pulses, No clubbing, cyanosis, or edema  LYMPH: No lymphadenopathy noted  SKIN: No rashes or lesions  ENDOCRINOLOGY: No Thyromegaly  PSYCH: Appropriate    Labs:    CARDIAC MARKERS ( 2018 14:59 )  x     / < 0.06 ng/mL / 21 u/L / 1.14 ng/mL / x                                9.7    9.78  )-----------( 297      ( 2018 05:31 )             28.8                         9.7    11.57 )-----------( 299      ( 2018 14:59 )             28.2     02-14    144  |  107  |  19  ----------------------------<  79  3.9   |  21<L>  |  1.21  -    144  |  108<H>  |  16  ----------------------------<  135<H>  4.3   |  22  |  1.04    Ca    9.0      2018 05:31  Ca    9.0      2018 14:59  Phos  3.0     -14  Mg     1.6     -14    TPro  6.6  /  Alb  3.5  /  TBili  0.6  /  DBili  x   /  AST  19  /  ALT  22  /  AlkPhos  106  02-13    CAPILLARY BLOOD GLUCOSE        LIVER FUNCTIONS - ( 2018 14:59 )  Alb: 3.5 g/dL / Pro: 6.6 g/dL / ALK PHOS: 106 u/L / ALT: 22 u/L / AST: 19 u/L / GGT: x             Urinalysis Basic - ( 2018 16:04 )    Color: PLYEL / Appearance: CLEAR / S.013 / pH: 6.5  Gluc: NEGATIVE / Ketone: NEGATIVE  / Bili: NEGATIVE / Urobili: NORMAL mg/dL   Blood: NEGATIVE / Protein: 300 mg/dL / Nitrite: NEGATIVE   Leuk Esterase: NEGATIVE / RBC: 0-2 / WBC 2-5   Sq Epi: OCC / Non Sq Epi: x / Bacteria: FEW      D DImer  Serum Pro-Brain Natriuretic Peptide: 4838 pg/mL ( @ 14:59)    Cultures:         < from: Xray Chest 2 Views PA/Lat (18 @ 16:54) >    EXAM:  XR CHEST PA LAT 2V        PROCEDURE DATE:  2018         INTERPRETATION:  CLINICAL INFORMATION: Shortness of breath.    TECHNIQUE: PA and lateral views of the chest    COMPARISON: Chest x-ray 2015.    FINDINGS:     Mild pulmonary edema and small bilateral pleural effusions.  The cardiac mediastinal silhouette is mildly enlarged.  The visualized osseous structures are unremarkable for age.    IMPRESSION:  Mild pulmonary edema and small bilateral pleural effusions.              RERE RHOADES M.D., RADIOLOGY RESIDENT  This document has been electronically signed.  VLADISLAV MATUTE M.D.; ATTENDING RADIOLOGIST  This document has been electronically signed. 2018  9:33AM    < end of copied text >          < from: CT Chest w/o Cont (14 @ 13:22) >   MIP images were reconstructed.     There are bilateral diffuse interlobular septal thickening with patchy   groundglass and tiny nodular opacities likely represent interstitial   pulmonary edema. Right basilar subsegmental atelectasis is seen.   Calcified granulomas are seen in the left lower lobe.    There is no significant mediastinal, hilar or axillary adenopathy.   Calcified left hilar lymph nodes are seen.1    Thyroid gland is unremarkable.    Trachea and main bronchi are patent. There is bronchial wall thickening.    There is no evidence of pleural effusion.    Heart size is within normal limits. There is no pericardial effusion.   Atherosclerotic calcification of coronary arteries and aorta is noted.1   Main pulmonary artery and aorta are normal in caliber.    Visualized bonesshow degenerative changes of T12-L1 disc space narrowing   and osteophyte formations.    CT scan through upper abdomen shows normal adrenals. Cholelithiasis.    IMPRESSION: Diffuse interstitial pulmonary edema.                FARIBA MCCORD M.D., ATTENDING RADIOLOGIST  This examination was interpreted on: 2014  3:45P.  This document   has been electronically signed. 2014  4:02P.          < end of copied text >            Studies  Chest X-RAY  CT SCAN Chest   CT Abdomen  Venous Dopplers: LE:   Others

## 2018-02-15 DIAGNOSIS — N17.9 ACUTE KIDNEY FAILURE, UNSPECIFIED: ICD-10-CM

## 2018-02-15 LAB
BACTERIA UR CULT: SIGNIFICANT CHANGE UP
BUN SERPL-MCNC: 28 MG/DL — HIGH (ref 7–23)
CALCIUM SERPL-MCNC: 9.1 MG/DL — SIGNIFICANT CHANGE UP (ref 8.4–10.5)
CHLORIDE SERPL-SCNC: 103 MMOL/L — SIGNIFICANT CHANGE UP (ref 98–107)
CO2 SERPL-SCNC: 22 MMOL/L — SIGNIFICANT CHANGE UP (ref 22–31)
CREAT SERPL-MCNC: 1.4 MG/DL — HIGH (ref 0.5–1.3)
GLUCOSE SERPL-MCNC: 89 MG/DL — SIGNIFICANT CHANGE UP (ref 70–99)
HBV SURFACE AG SER-ACNC: NEGATIVE — SIGNIFICANT CHANGE UP
HCT VFR BLD CALC: 26.4 % — LOW (ref 34.5–45)
HCV AB S/CO SERPL IA: 0.06 S/CO — SIGNIFICANT CHANGE UP
HCV AB SERPL-IMP: SIGNIFICANT CHANGE UP
HGB BLD-MCNC: 9.1 G/DL — LOW (ref 11.5–15.5)
KAPPA FREE LIGHT CHAINS, SERUM: 1.81 MG/DL — SIGNIFICANT CHANGE UP (ref 0.33–1.94)
LAMBDA FREE LIGHT CHAINS, SERUM: 2.42 MG/DL — SIGNIFICANT CHANGE UP (ref 0.57–2.63)
MAGNESIUM SERPL-MCNC: 1.5 MG/DL — LOW (ref 1.6–2.6)
MCHC RBC-ENTMCNC: 32.4 PG — SIGNIFICANT CHANGE UP (ref 27–34)
MCHC RBC-ENTMCNC: 34.5 % — SIGNIFICANT CHANGE UP (ref 32–36)
MCV RBC AUTO: 94 FL — SIGNIFICANT CHANGE UP (ref 80–100)
NRBC # FLD: 0 — SIGNIFICANT CHANGE UP
PLATELET # BLD AUTO: 326 K/UL — SIGNIFICANT CHANGE UP (ref 150–400)
PMV BLD: 10.5 FL — SIGNIFICANT CHANGE UP (ref 7–13)
POTASSIUM SERPL-MCNC: 3.9 MMOL/L — SIGNIFICANT CHANGE UP (ref 3.5–5.3)
POTASSIUM SERPL-SCNC: 3.9 MMOL/L — SIGNIFICANT CHANGE UP (ref 3.5–5.3)
RBC # BLD: 2.81 M/UL — LOW (ref 3.8–5.2)
RBC # FLD: 13.1 % — SIGNIFICANT CHANGE UP (ref 10.3–14.5)
SODIUM SERPL-SCNC: 142 MMOL/L — SIGNIFICANT CHANGE UP (ref 135–145)
SPECIMEN SOURCE: SIGNIFICANT CHANGE UP
WBC # BLD: 8.99 K/UL — SIGNIFICANT CHANGE UP (ref 3.8–10.5)
WBC # FLD AUTO: 8.99 K/UL — SIGNIFICANT CHANGE UP (ref 3.8–10.5)

## 2018-02-15 PROCEDURE — 84165 PROTEIN E-PHORESIS SERUM: CPT | Mod: 26

## 2018-02-15 PROCEDURE — 93306 TTE W/DOPPLER COMPLETE: CPT | Mod: 26

## 2018-02-15 PROCEDURE — 86334 IMMUNOFIX E-PHORESIS SERUM: CPT | Mod: 26

## 2018-02-15 RX ORDER — FUROSEMIDE 40 MG
40 TABLET ORAL DAILY
Qty: 0 | Refills: 0 | Status: DISCONTINUED | OUTPATIENT
Start: 2018-02-16 | End: 2018-02-17

## 2018-02-15 RX ORDER — METOPROLOL TARTRATE 50 MG
50 TABLET ORAL
Qty: 0 | Refills: 0 | Status: DISCONTINUED | OUTPATIENT
Start: 2018-02-15 | End: 2018-02-16

## 2018-02-15 RX ORDER — BENZOCAINE AND MENTHOL 5; 1 G/100ML; G/100ML
1 LIQUID ORAL EVERY 4 HOURS
Qty: 0 | Refills: 0 | Status: DISCONTINUED | OUTPATIENT
Start: 2018-02-15 | End: 2018-02-25

## 2018-02-15 RX ORDER — LORATADINE 10 MG/1
10 TABLET ORAL DAILY
Qty: 0 | Refills: 0 | Status: DISCONTINUED | OUTPATIENT
Start: 2018-02-15 | End: 2018-02-25

## 2018-02-15 RX ORDER — MAGNESIUM SULFATE 500 MG/ML
1 VIAL (ML) INJECTION ONCE
Qty: 0 | Refills: 0 | Status: COMPLETED | OUTPATIENT
Start: 2018-02-15 | End: 2018-02-15

## 2018-02-15 RX ADMIN — BENZOCAINE AND MENTHOL 1 LOZENGE: 5; 1 LIQUID ORAL at 23:12

## 2018-02-15 RX ADMIN — Medication 325 MILLIGRAM(S): at 13:13

## 2018-02-15 RX ADMIN — Medication 25 MILLIGRAM(S): at 13:13

## 2018-02-15 RX ADMIN — BUDESONIDE AND FORMOTEROL FUMARATE DIHYDRATE 2 PUFF(S): 160; 4.5 AEROSOL RESPIRATORY (INHALATION) at 09:06

## 2018-02-15 RX ADMIN — Medication 1 TABLET(S): at 13:14

## 2018-02-15 RX ADMIN — BUDESONIDE AND FORMOTEROL FUMARATE DIHYDRATE 2 PUFF(S): 160; 4.5 AEROSOL RESPIRATORY (INHALATION) at 21:12

## 2018-02-15 RX ADMIN — Medication 40 MILLIGRAM(S): at 13:14

## 2018-02-15 RX ADMIN — Medication 25 MILLIGRAM(S): at 06:19

## 2018-02-15 RX ADMIN — LORATADINE 10 MILLIGRAM(S): 10 TABLET ORAL at 21:44

## 2018-02-15 RX ADMIN — Medication 81 MILLIGRAM(S): at 13:14

## 2018-02-15 RX ADMIN — Medication 40 MILLIGRAM(S): at 06:20

## 2018-02-15 RX ADMIN — Medication 1000 UNIT(S): at 13:14

## 2018-02-15 RX ADMIN — LOSARTAN POTASSIUM 50 MILLIGRAM(S): 100 TABLET, FILM COATED ORAL at 06:18

## 2018-02-15 RX ADMIN — HEPARIN SODIUM 5000 UNIT(S): 5000 INJECTION INTRAVENOUS; SUBCUTANEOUS at 06:18

## 2018-02-15 RX ADMIN — PANTOPRAZOLE SODIUM 40 MILLIGRAM(S): 20 TABLET, DELAYED RELEASE ORAL at 06:19

## 2018-02-15 RX ADMIN — Medication 25 MILLIGRAM(S): at 17:44

## 2018-02-15 RX ADMIN — Medication 100 GRAM(S): at 09:06

## 2018-02-15 RX ADMIN — SERTRALINE 100 MILLIGRAM(S): 25 TABLET, FILM COATED ORAL at 13:14

## 2018-02-15 RX ADMIN — Medication 25 MILLIGRAM(S): at 06:18

## 2018-02-15 RX ADMIN — AMLODIPINE BESYLATE 10 MILLIGRAM(S): 2.5 TABLET ORAL at 06:18

## 2018-02-15 RX ADMIN — CINACALCET 30 MILLIGRAM(S): 30 TABLET, FILM COATED ORAL at 10:24

## 2018-02-15 RX ADMIN — BUDESONIDE AND FORMOTEROL FUMARATE DIHYDRATE 2 PUFF(S): 160; 4.5 AEROSOL RESPIRATORY (INHALATION) at 00:31

## 2018-02-15 RX ADMIN — Medication 25 MILLIGRAM(S): at 00:31

## 2018-02-15 RX ADMIN — HEPARIN SODIUM 5000 UNIT(S): 5000 INJECTION INTRAVENOUS; SUBCUTANEOUS at 17:44

## 2018-02-15 RX ADMIN — Medication 25 MILLIGRAM(S): at 23:15

## 2018-02-15 RX ADMIN — CLOPIDOGREL BISULFATE 75 MILLIGRAM(S): 75 TABLET, FILM COATED ORAL at 13:13

## 2018-02-15 RX ADMIN — Medication 650 MILLIGRAM(S): at 00:00

## 2018-02-15 RX ADMIN — ATORVASTATIN CALCIUM 20 MILLIGRAM(S): 80 TABLET, FILM COATED ORAL at 21:12

## 2018-02-15 NOTE — DIETITIAN INITIAL EVALUATION ADULT. - PHYSICAL APPEARANCE
Nutrition focused physical exam conducted - found signs of malnutrition [ ]absent [X]present Subcutaneous fat loss: [ ] Orbital fat pads region, [Severe ]Buccal fat region, [ ]Triceps region,  [ ]Ribs region  Muscle wasting: [Moderate]Temples region, [ Moderate]Clavicle region, [ Moderate]Shoulder region, [ ]Scapula region, [ ]Interosseous region,  [ ]thigh region, [ ]Calf region

## 2018-02-15 NOTE — PROGRESS NOTE ADULT - ASSESSMENT
83 y/o F with h/o COPD, CHF, HTN, HLD, DM, breast Ca s/p left lumpectomy and RT, diverticulosis presents to the ED for shortness of breath. Admit to telemetry.

## 2018-02-15 NOTE — PROGRESS NOTE ADULT - PROBLEM SELECTOR PLAN 1
Patient with PAM likely hemodynamically mediated due to diuretic therapy in setting of ARB use. Recommend decrease diuretics to daily if ok with cardiology. Avoid nephrotoxins.

## 2018-02-15 NOTE — PROGRESS NOTE ADULT - ASSESSMENT
83 y/o F with h/o COPD, CHF, HTN, HLD, DM, breast Ca s/p left lumpectomy and RT, diverticulosis presents to the ED for shortness of breath:  1. SOB - Acute on chronic diast CHF, diuresis per Cardio, now improving, I/O, TTE  2. Urinary retention - currently resolved, monitor residuals  3. HLD - c/w statin  4. HTN - BP elevated, plan per Cardio  5. DM2 - pt not on any meds after 40lb weight loss, may start on ISS  6. COPD - Pulm appreciated, CR reviewed, steroids per Pulm, albuterol PRN  7. DVT prophylaxis

## 2018-02-15 NOTE — PROGRESS NOTE ADULT - ATTENDING COMMENTS
Agree with above NP note.  cv stable   echo with lv intracav gradient without sig AS  inc bb to 50mg BID to dec inotropy  will defer raudel as pt would not be a surgical candidate if any subvalvular membrane is seen on RAUDEL  cont lasix po  med/pulmo/renal f/u

## 2018-02-15 NOTE — DISCHARGE NOTE ADULT - HOSPITAL COURSE
85 y/o F with h/o COPD on 3L of O2 at home, HTN, CHF, DM, HLD, diverticulosis, breast ca s/p lumpectomy and RT, presents to the ED for urinary retention X 5 days and shortness of breath. Pt states she has been dribbling her urine for the past 5 days. Pt states she has been going to urinate more often. Pt also states she has shortness of breath with exertion. Pt states she had to decrease her Lasix dose due to worsening renal function.     On admission: CXR: pulm edema with bilateral pleural effusion. CT Chest: Small right pleural effusion. Emphysematous changes of the lungs. Cardiomegaly. Cholelithiasis. RVP negative.    Cardiology: Pt with prior history of HFpEF, CKD. Prior ECHO reveals mild aortic stenosis/regurgitation w hyperdynamic LV w significant intracavitary gradient. Pt presenting with worsening dyspnea after decreasing her Lasix dose based on her PMD recs. Exam reveals mild CHF, mumur consistent w at least moderate AS. Plan to continue IV lasix. ECHO to eval AV. Pulm eval. Renal eval. Med eval    Renal consult (Fernando): CKD (chronic kidney disease), stage III. Patient with CKD-3 which appears age appropriate. 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 Suspect due to DM as patient admits to h/o DM for 20 years which has resolved after significant weight loss. Check spot urine TP/CR to quantify proteinuria. Check Hep B, C and paraproteinemia work up to rule out alternative secondary etiologies. Continue with losartan for anti-proteinuric effect. Essential hypertension: BP improving. Continue with current medications and low sodium diet. Monitor BP. Edema, lower extremity: Improving. Can decrease Lasix to daily if volume status continues to improve.     Pulmonary (Mehrishi):  COPD Does not seem like COPD exacerbation: She is non compliant with inhalers: Would add Symbicort: I don't think there is any need for steroids at this time. Will monitor.     CT Chest: The findings are most compatible with resolving pulmonary edema rather than infection or interstitial lung disease. Follow-up is recommended to confirm resolution in 4 weeks.    Pulmonary f/u: Interesting CT scan which has been abnormal at least since 2014: She is having this chronic infiltrative disease of unknown etiology? Would do complete vasculitis workup and ?may need lung biopsy; BELEM Johansen: DDX is difficult to come up with: do ins/exp CT to determine air trapping, Pt did get radiation to left breast in 1998: but this disease is bilateral diffuse: she was on tamoxifen for 5 yrs but no other chemotherapy: There is no hx of recurrent bleeding in lung too: will follow.    EP consult was called for NSVT in setting of heart failure, severe AS, HTN. Would continue to treat fluid overload. Continue BP control and increase beta blockers for now. If continues to have NSVT, consider verapamil. Will continue to monitor on tele.     Echocardiogram: EF 69%. Mitral annular calcification, otherwise normal mitral valve. Mild mitral regurgitation.  Aortic valve leaflet morphology not well visualized. Peak transaortic valve gradient equals 70 mm Hg, mean transaortic valve gradient equals 41 mm Hg.  Despite the transaortic gradients, the gross appearance of the valve is not consistent with severe aortic stenosis.  Cannot exclude the presence of subvalvular LVOT obstruction (ie. subaortic membrane). Mild-moderate aortic regurgitation.  Severely dilated left atrium.  LA volume index = 65 cc/m2. Normal left ventricular internal dimensions and wall thicknesses. Normal left ventricular systolic function. No segmental wall motion abnormalities.  Normal right ventricular size and function. Consider SUNDAR for further evaluation of the aortic valve and possible causes of LVOT obstruction, if clinically indicated.    ******incomplete

## 2018-02-15 NOTE — DISCHARGE NOTE ADULT - PATIENT PORTAL LINK FT
You can access the [facility] Patient Portal, offered by [facility], by registering with the following website: [email or web address]

## 2018-02-15 NOTE — PROGRESS NOTE ADULT - ASSESSMENT
83 y/o F with h/o COPD on 3L of O2 at home, HTN, DCHF, DM, HLD, diverticulosis, breast ca s/p lumpectomy and RT, presents  with  urinary retention  and shortness of breath    1. acute on chronic DCHF  clinically improving   pending echo to eval LV fx   change lasix to 40 mg PO daily   continue with BB     2.  Mild AS/  Moderate aortic regurgitation.   Prior ECHO reveals mild aortic stenosis/regurgitation w hyperdynamic LV w significant intracavitary gradient  pending repeat echo to eval Aortic valve    continue with diuretics/ bb     3. HTN   repeat bp acceptable   continue with current anti-htn meds   if remains elevated change metoprolol to coreg 6.25 mg BID for better sys bp control

## 2018-02-15 NOTE — DISCHARGE NOTE ADULT - PLAN OF CARE
Low salt intake. Restrict fluid intake based on your doctors recommendations. Monitor daily weights. If you note weight gain >3-4lbs in one week, follow up with your doctor or return to the hospital immediately. Follow up with your cardiologist as outpatient in 1-2 weeks for further monitoring. Please call for appointment. Repeat CT chest in 4 weeks with you pulmonologist Dr. Saucedo. Follow up with your nephrologist Dr. Fernando for further monitoring in 1-2 weeks. Please call to arrange appointment. Avoid medications that may be harmful to your kidneys such as NSAID pain relievers (Advil, Aleve, Motrin, etc.) Use Tylenol for pain if needed. Avoid contrast if you require any medical testing. Follow up with your primary care physician for further monitoring in 1-2 weeks. Please call to arrange appointment. Low cholesterol diet. Salt restriction. 1800Kcal diabetic diet with carb restriction. Follow up with your primary care physician for further monitoring in 1-2 weeks. Please call to arrange appointment. Continue diet modification. Avoid complex carbohydrates such as bread, pasta, cereal, white rice, white potatoes, etc. Avoid concentrated sugar as found in desserts, candy, soda, juice, etc. Consume a diet based on lean protein (chicken, fish) and vegetables.

## 2018-02-15 NOTE — DISCHARGE NOTE ADULT - CARE PLAN
Principal Discharge DX:	Acute on chronic diastolic (congestive) heart failure  Assessment and plan of treatment:	Low salt intake. Restrict fluid intake based on your doctors recommendations. Monitor daily weights. If you note weight gain >3-4lbs in one week, follow up with your doctor or return to the hospital immediately. Follow up with your cardiologist as outpatient in 1-2 weeks for further monitoring. Please call for appointment.  Secondary Diagnosis:	COPD (Chronic Obstructive Pulmonary Disease)  Assessment and plan of treatment:	Repeat CT chest in 4 weeks with you pulmonologist Dr. Saucedo.  Secondary Diagnosis:	CKD (chronic kidney disease), stage III  Assessment and plan of treatment:	Follow up with your nephrologist Dr. Fernando for further monitoring in 1-2 weeks. Please call to arrange appointment. Avoid medications that may be harmful to your kidneys such as NSAID pain relievers (Advil, Aleve, Motrin, etc.) Use Tylenol for pain if needed. Avoid contrast if you require any medical testing.  Secondary Diagnosis:	Dyslipidemia  Assessment and plan of treatment:	Follow up with your primary care physician for further monitoring in 1-2 weeks. Please call to arrange appointment. Low cholesterol diet. Salt restriction. 1800Kcal diabetic diet with carb restriction.  Secondary Diagnosis:	DM (diabetes mellitus)  Assessment and plan of treatment:	Follow up with your primary care physician for further monitoring in 1-2 weeks. Please call to arrange appointment. Continue diet modification. Avoid complex carbohydrates such as bread, pasta, cereal, white rice, white potatoes, etc. Avoid concentrated sugar as found in desserts, candy, soda, juice, etc. Consume a diet based on lean protein (chicken, fish) and vegetables.  Secondary Diagnosis:	Essential hypertension  Assessment and plan of treatment:	Follow up with your primary care physician for further monitoring in 1-2 weeks. Please call to arrange appointment. Low cholesterol diet. Salt restriction. 1800Kcal diabetic diet with carb restriction.

## 2018-02-15 NOTE — PROGRESS NOTE ADULT - ATTENDING COMMENTS
[facility] NEPHROLOGY  Elliott Beltran M.D.  Marco Antonio Fernando D.O.  Tracie Mcfadden M.D.  Britney Marie, MSN, ANP-C    Telephone: [phone]  Facsimile: [phone]    [address] [place] [address]

## 2018-02-15 NOTE — PROGRESS NOTE ADULT - PROBLEM SELECTOR PLAN 1
CT scan reviewed: has emphysema: as well as small effusion on the right side: Add steroids for a few day s:

## 2018-02-15 NOTE — DIETITIAN INITIAL EVALUATION ADULT. - OTHER INFO
Nutrition consult received for RD-heart failure linked order set. Pt repots fair PO intake at this time. No GI distress (nausea/vomiting/diarrhea/constipation.) No difficulties chewing and swallowing. Pt reports decreased appetite and PO intake over time, especially  since the passing of her daughter.  Pt drinks Boost Shakes prior to admission most days. PO intake and smaller more frequents meals encouraged given poor appetite and early satiety.  She is amenable to Ensure Shakes while inpatient.

## 2018-02-15 NOTE — DISCHARGE NOTE ADULT - SECONDARY DIAGNOSIS.
COPD (Chronic Obstructive Pulmonary Disease) CKD (chronic kidney disease), stage III Dyslipidemia DM (diabetes mellitus) Essential hypertension

## 2018-02-15 NOTE — DISCHARGE NOTE ADULT - CARE PROVIDER_API CALL
Uday Holliday), Cardiovascular Disease; Internal Medicine; Interventional Cardiology; Nuclear Cardiology  [address] [facility] [address] [facility] [address]  [place] [address]  Phone: [phone]  Fax: [phone]    Marco Antonio Fernando (), Internal Medicine  [address] [place] [address]  Phone: [phone]  Fax: [phone]    Tim Saucedo), Critical Care Medicine; Internal Medicine; Pulmonary Disease; Sleep Medicine  [address] [place] [address]  Phone: [phone]  Fax: [phone]

## 2018-02-15 NOTE — DIETITIAN INITIAL EVALUATION ADULT. - NS FNS REASON FOR WEIGHT CHANG
decreased po intake/other (specify)/Pt reports usual Wt. of 170 pounds ~ 2 years ago, endorses Wt. loss and believes her current Wt. to be 130-135 pounds.

## 2018-02-15 NOTE — CHART NOTE - NSCHARTNOTEFT_GEN_A_CORE
NUTRITION SERVICES                                                                                  MALNUTRITION ALERT     Attention Health Care Provider: Upon nutritional assessment by the Registered Dietitian your patient was determined to meet criteria / has evidence of the following diagnosis/diagnoses:    [ ] Mild Protein Calorie Malnutrition   [ X] Moderate Protein Calorie Malnutrition   [ ] Severe Protein Calorie Malnutrition   [ ] Unspecified Protein Calorie Malnutrition   [ ] Underweight / BMI <19  [ ] Morbid Obesity / BMI >40    By signing this assessment you are acknowledging the diagnosis/diagnoses.       PLAN OF CARE: Refer to Initial Dietitian Evaluation or Nutrition Follow-Up Documentation for Nutritional Recommendations.

## 2018-02-15 NOTE — DIETITIAN INITIAL EVALUATION ADULT. - PERTINENT LABORATORY DATA
02-15 Na142 mmol/L Glu 89 mg/dL K+ 3.9 mmol/L Cr  1.40 mg/dL<H> BUN 28 mg/dL<H> Phos n/a   Alb n/a   PAB n/a

## 2018-02-16 DIAGNOSIS — R93.8 ABNORMAL FINDINGS ON DIAGNOSTIC IMAGING OF OTHER SPECIFIED BODY STRUCTURES: ICD-10-CM

## 2018-02-16 LAB
BUN SERPL-MCNC: 41 MG/DL — HIGH (ref 7–23)
CALCIUM SERPL-MCNC: 9.1 MG/DL — SIGNIFICANT CHANGE UP (ref 8.4–10.5)
CHLORIDE SERPL-SCNC: 102 MMOL/L — SIGNIFICANT CHANGE UP (ref 98–107)
CO2 SERPL-SCNC: 21 MMOL/L — LOW (ref 22–31)
CREAT SERPL-MCNC: 1.53 MG/DL — HIGH (ref 0.5–1.3)
GLUCOSE SERPL-MCNC: 134 MG/DL — HIGH (ref 70–99)
HCT VFR BLD CALC: 24.6 % — LOW (ref 34.5–45)
HGB BLD-MCNC: 8.3 G/DL — LOW (ref 11.5–15.5)
KAPPA/LAMBDA FREE LIGHT CHAIN RATIO, SERUM: 0.75 — SIGNIFICANT CHANGE UP
MAGNESIUM SERPL-MCNC: 1.9 MG/DL — SIGNIFICANT CHANGE UP (ref 1.6–2.6)
MCHC RBC-ENTMCNC: 31 PG — SIGNIFICANT CHANGE UP (ref 27–34)
MCHC RBC-ENTMCNC: 33.7 % — SIGNIFICANT CHANGE UP (ref 32–36)
MCV RBC AUTO: 91.8 FL — SIGNIFICANT CHANGE UP (ref 80–100)
NRBC # FLD: 0 — SIGNIFICANT CHANGE UP
PLATELET # BLD AUTO: 358 K/UL — SIGNIFICANT CHANGE UP (ref 150–400)
PMV BLD: 10.3 FL — SIGNIFICANT CHANGE UP (ref 7–13)
POTASSIUM SERPL-MCNC: 4.1 MMOL/L — SIGNIFICANT CHANGE UP (ref 3.5–5.3)
POTASSIUM SERPL-SCNC: 4.1 MMOL/L — SIGNIFICANT CHANGE UP (ref 3.5–5.3)
RBC # BLD: 2.68 M/UL — LOW (ref 3.8–5.2)
RBC # FLD: 13.1 % — SIGNIFICANT CHANGE UP (ref 10.3–14.5)
SODIUM SERPL-SCNC: 139 MMOL/L — SIGNIFICANT CHANGE UP (ref 135–145)
WBC # BLD: 9.91 K/UL — SIGNIFICANT CHANGE UP (ref 3.8–10.5)
WBC # FLD AUTO: 9.91 K/UL — SIGNIFICANT CHANGE UP (ref 3.8–10.5)

## 2018-02-16 PROCEDURE — 99233 SBSQ HOSP IP/OBS HIGH 50: CPT

## 2018-02-16 PROCEDURE — 93010 ELECTROCARDIOGRAM REPORT: CPT

## 2018-02-16 RX ORDER — LANOLIN ALCOHOL/MO/W.PET/CERES
3 CREAM (GRAM) TOPICAL AT BEDTIME
Qty: 0 | Refills: 0 | Status: DISCONTINUED | OUTPATIENT
Start: 2018-02-16 | End: 2018-02-25

## 2018-02-16 RX ORDER — LANOLIN ALCOHOL/MO/W.PET/CERES
3 CREAM (GRAM) TOPICAL ONCE
Qty: 0 | Refills: 0 | Status: COMPLETED | OUTPATIENT
Start: 2018-02-16 | End: 2018-02-16

## 2018-02-16 RX ORDER — METOPROLOL TARTRATE 50 MG
75 TABLET ORAL
Qty: 0 | Refills: 0 | Status: DISCONTINUED | OUTPATIENT
Start: 2018-02-16 | End: 2018-02-20

## 2018-02-16 RX ADMIN — LOSARTAN POTASSIUM 50 MILLIGRAM(S): 100 TABLET, FILM COATED ORAL at 05:42

## 2018-02-16 RX ADMIN — BENZOCAINE AND MENTHOL 1 LOZENGE: 5; 1 LIQUID ORAL at 22:32

## 2018-02-16 RX ADMIN — Medication 25 MILLIGRAM(S): at 12:10

## 2018-02-16 RX ADMIN — Medication 3 MILLIGRAM(S): at 00:52

## 2018-02-16 RX ADMIN — CLOPIDOGREL BISULFATE 75 MILLIGRAM(S): 75 TABLET, FILM COATED ORAL at 12:10

## 2018-02-16 RX ADMIN — ATORVASTATIN CALCIUM 20 MILLIGRAM(S): 80 TABLET, FILM COATED ORAL at 22:25

## 2018-02-16 RX ADMIN — Medication 1 TABLET(S): at 12:10

## 2018-02-16 RX ADMIN — Medication 325 MILLIGRAM(S): at 12:10

## 2018-02-16 RX ADMIN — AMLODIPINE BESYLATE 10 MILLIGRAM(S): 2.5 TABLET ORAL at 05:42

## 2018-02-16 RX ADMIN — LORATADINE 10 MILLIGRAM(S): 10 TABLET ORAL at 12:10

## 2018-02-16 RX ADMIN — Medication 1000 UNIT(S): at 12:10

## 2018-02-16 RX ADMIN — PANTOPRAZOLE SODIUM 40 MILLIGRAM(S): 20 TABLET, DELAYED RELEASE ORAL at 06:27

## 2018-02-16 RX ADMIN — Medication 81 MILLIGRAM(S): at 12:10

## 2018-02-16 RX ADMIN — Medication 40 MILLIGRAM(S): at 05:42

## 2018-02-16 RX ADMIN — Medication 25 MILLIGRAM(S): at 17:56

## 2018-02-16 RX ADMIN — HEPARIN SODIUM 5000 UNIT(S): 5000 INJECTION INTRAVENOUS; SUBCUTANEOUS at 17:56

## 2018-02-16 RX ADMIN — SERTRALINE 100 MILLIGRAM(S): 25 TABLET, FILM COATED ORAL at 12:10

## 2018-02-16 RX ADMIN — Medication 25 MILLIGRAM(S): at 22:25

## 2018-02-16 RX ADMIN — Medication 25 MILLIGRAM(S): at 05:43

## 2018-02-16 RX ADMIN — Medication 50 MILLIGRAM(S): at 05:42

## 2018-02-16 RX ADMIN — HEPARIN SODIUM 5000 UNIT(S): 5000 INJECTION INTRAVENOUS; SUBCUTANEOUS at 05:43

## 2018-02-16 RX ADMIN — BUDESONIDE AND FORMOTEROL FUMARATE DIHYDRATE 2 PUFF(S): 160; 4.5 AEROSOL RESPIRATORY (INHALATION) at 08:02

## 2018-02-16 RX ADMIN — BENZOCAINE AND MENTHOL 1 LOZENGE: 5; 1 LIQUID ORAL at 08:02

## 2018-02-16 RX ADMIN — BUDESONIDE AND FORMOTEROL FUMARATE DIHYDRATE 2 PUFF(S): 160; 4.5 AEROSOL RESPIRATORY (INHALATION) at 22:27

## 2018-02-16 RX ADMIN — Medication 75 MILLIGRAM(S): at 17:56

## 2018-02-16 RX ADMIN — Medication 3 MILLIGRAM(S): at 22:25

## 2018-02-16 NOTE — PROGRESS NOTE ADULT - ASSESSMENT
85 y/o F with h/o COPD on 3L of O2 at home, HTN, DCHF, DM, HLD, diverticulosis, breast ca s/p lumpectomy and RT, presents  with  urinary retention  and shortness of breath    1. acute on chronic DCHF  clinically improving   echo with lv intracav gradient without sig AS, normal LV sys fx   IV lasix changed to  40 mg PO daily   monitor creat levels   continue with BB     2.  Mild AS/  Moderate aortic regurgitation.   echo with lv intracav gradient without sig AS  continue with diuretics  WCT noted on tele  inc bb to 75mg BID to dec inotropy , and decrease ectopy noted on tele     3. HTN   repeat bp acceptable   continue with current anti-htn meds 85 y/o F with h/o COPD on 3L of O2 at home, HTN, DCHF, DM, HLD, diverticulosis, breast ca s/p lumpectomy and RT, presents  with  urinary retention  and shortness of breath    1. acute on chronic DCHF  clinically improving   echo with lv intracav gradient without sig AS, normal LV sys fx   IV lasix changed to  40 mg PO daily   monitor creat levels   continue with BB     2.  Mild AS/  Moderate aortic regurgitation.   echo with lv intracav gradient without sig AS  continue with diuretics  WCT noted on tele  inc bb to 75mg BID to dec inotropy , and decrease ectopy noted on tele   EPs eval   check carotid dopplers     3. HTN   repeat bp acceptable   continue with current anti-htn meds

## 2018-02-16 NOTE — PROGRESS NOTE ADULT - PROBLEM SELECTOR PLAN 1
Interesting ct scan which has  been abnormal atleast since 2014: She is having this chronic infiltrative disease of unknown etiology? would do complete vasculitis workup and ? may need lung biopsy Interesting ct scan which has  been abnormal atleast since 2014: She is having this chronic infiltrative disease of unknown etiology? would do complete vasculitis workup and ? may need lung biopsy/;  BELEM Johansen: DDX is difficult to come u p with : do ins /exp ct to deermine air trapping, pt did get radiation to left breast in 1998 : but this disease is bilateral diffuse: she was on tamoxifen for 5 yrs but no other chemotherapy: There is no hx of recurrent bleeding in lung too: will follow

## 2018-02-16 NOTE — PROGRESS NOTE ADULT - ASSESSMENT
83 y/o F with h/o COPD, CHF, HTN, HLD, DM, breast Ca s/p left lumpectomy and RT, diverticulosis presents to the ED for shortness of breath:  1. SOB - Acute on chronic diast CHF, now appears euvolemic, changed to PO Lasix  2. Urinary retention - resolved  3. HLD - c/w statin  4. HTN - BP elevated, plan per Cardio  5. DM2 - pt not on any meds after 40lb weight loss, may start on ISS  6. COPD - Pulm appreciated, CR reviewed, steroids per Pulm, albuterol PRN  7. DVT prophylaxis  8. Anemia - Hb drop noted, no bleeding, repeat CBC later today  9. PAM - likely related to diuresis, monitor on PO Lasix

## 2018-02-16 NOTE — PROGRESS NOTE ADULT - ATTENDING COMMENTS
[facility] NEPHROLOGY  Elliott Beltran M.D.  Marco Antonio Fernando D.O.  Tracie Mcfadden M.D.  Britney Marie, MSN, ANP-C    Telephone: [phone]  Facsimile: [phone]    [address] [place] [address]

## 2018-02-16 NOTE — PROGRESS NOTE ADULT - ATTENDING COMMENTS
Agree with above NP note.  cv stable  wct on tele  cont bb, inc dose  eps eval  cont lasix  if creat cont to rise, d/c lasix

## 2018-02-16 NOTE — CONSULT NOTE ADULT - SUBJECTIVE AND OBJECTIVE BOX
Patient seen and evaluated at bedside    Chief Complaint: SOB    HPI:  83 yo F with h/o COPD on 3L of O2 at home, HTN, CHF, DM, HLD, diverticulosis, breast ca s/p lumpectomy and RT, presents to the ED for urinary retention X 5 days and shortness of breath. Pt states she has been dribbling her urine for the past 5 days. Pt states she has been going to urinate more often. Pt also states she has shortness of breath with exertion. Pt states she had to decrease her lasix dose due to worsening renal function. Pt denies LOC, syncope,fever chillls, N/V/D/C, numbness, tingling, chest pain, palpitations, lightheadness, dizziness, dysuria, urinary/bowel incontinence or any other complaints at this time. This morning she had an episode of NSVT and she said she felt palpitations.      PMH:   Congestive heart failure (CHF)  Pulmonary hypertension  Emphysema of lung  Femoral Artery Thrombosis  History of Colonoscopy with Polypectomy  History of Hysterectomy  Diverticulosis of Colon  Breast Cancer  Dyslipidemia  DM (Diabetes Mellitus)  HTN (Hypertension)  COPD (Chronic Obstructive Pulmonary Disease)      PSH:   History of tonsillectomy  S/P lumpectomy, left breast  H/O total hysterectomy      Medications:   acetaminophen   Tablet. 650 milliGRAM(s) Oral every 6 hours PRN  ALBUTerol    90 MICROgram(s) HFA Inhaler 2 Puff(s) Inhalation every 6 hours PRN  amLODIPine   Tablet 10 milliGRAM(s) Oral daily  aspirin enteric coated 81 milliGRAM(s) Oral daily  atorvastatin 20 milliGRAM(s) Oral at bedtime  benzocaine 15 mG/menthol 3.6 mG Lozenge 1 Lozenge Oral every 4 hours PRN  buDESOnide 160 MICROgram(s)/formoterol 4.5 MICROgram(s) Inhaler 2 Puff(s) Inhalation two times a day  cholecalciferol 1000 Unit(s) Oral daily  cinacalcet 30 milliGRAM(s) Oral <User Schedule>  clopidogrel Tablet 75 milliGRAM(s) Oral daily  ferrous    sulfate 325 milliGRAM(s) Oral daily  furosemide    Tablet 40 milliGRAM(s) Oral daily  heparin  Injectable 5000 Unit(s) SubCutaneous every 12 hours  hydrALAZINE 25 milliGRAM(s) Oral four times a day  loratadine 10 milliGRAM(s) Oral daily  losartan 50 milliGRAM(s) Oral daily  melatonin 3 milliGRAM(s) Oral at bedtime  metoprolol     tartrate 75 milliGRAM(s) Oral two times a day  multivitamin 1 Tablet(s) Oral daily  pantoprazole    Tablet 40 milliGRAM(s) Oral before breakfast  predniSONE   Tablet 40 milliGRAM(s) Oral daily  sertraline 100 milliGRAM(s) Oral daily      Allergies:  lisinopril (Anaphylaxis)  penicillin (Anaphylaxis)      FAMILY HISTORY:  No pertinent family history in first degree relatives      Review of Systems:  REVIEW OF SYSTEMS:    CONSTITUTIONAL: No weakness, fevers or chills  EYES/ENT: No visual changes;  No dysphagia  NECK: No pain or stiffness  RESPIRATORY: No cough, wheezing, hemoptysis; No shortness of breath  CARDIOVASCULAR: +palpitations; No lower extremity edema  GASTROINTESTINAL: No abdominal or epigastric pain. No nausea, vomiting, or hematemesis; No diarrhea or constipation. No melena or hematochezia.  BACK: No back pain  GENITOURINARY: No dysuria, frequency or hematuria  NEUROLOGICAL: No numbness or weakness  SKIN: No itching, burning, rashes, or lesions   All other review of systems is negative unless indicated above.    Physical Exam:  T(F): 98.5 (02-16), Max: 98.7 (02-15)  HR: 61 (02-16) (61 - 70)  BP: 158/56 (02-16) (138/48 - 168/64)  RR: 17 (02-16)  SpO2: 99% (02-16)  GENERAL: No acute distress, well-developed  HEAD:  Atraumatic, Normocephalic  ENT: EOMI, No JVD, moist mucosa  CHEST/LUNG: Clear to auscultation bilaterally; No wheeze, equal breath sounds bilaterally   HEART: Regular rate and rhythm; No murmurs, rubs, or gallops  ABDOMEN: Soft, Nontender, Nondistended; Bowel sounds present  EXTREMITIES:  No clubbing, cyanosis, or edema  PSYCH: Nl behavior, nl affect  NEUROLOGY: AAOx3, non-focal    Cardiovascular Diagnostic Testing:    ECG: Personally reviewed    Echo:    Stress Testing:    Cath:    Interpretation of Telemetry:    Imaging:    Labs: Personally reviewed                        8.3    9.91  )-----------( 358      ( 16 Feb 2018 06:30 )             24.6     02-16    139  |  102  |  41<H>  ----------------------------<  134<H>  4.1   |  21<L>  |  1.53<H>    Ca    9.1      16 Feb 2018 06:30  Mg     1.9     02-16            Serum Pro-Brain Natriuretic Peptide: 4838 pg/mL (02-13 @ 14:59)      Hemoglobin A1C, Whole Blood: 5.4 % (02-14 @ 05:31)    Thyroid Stimulating Hormone, Serum: 1.98 uIU/mL (02-14 @ 05:31) Patient seen and evaluated at bedside    Chief Complaint: SOB    HPI:  85 yo F with h/o COPD on 3L of O2 at home, HTN, CHF, DM, HLD, diverticulosis, breast ca s/p lumpectomy and RT, presents to the ED for urinary retention X 5 days and shortness of breath. Pt states she has been dribbling her urine for the past 5 days. Pt states she has been going to urinate more often. Pt also states she has shortness of breath with exertion. Pt states she had to decrease her lasix dose due to worsening renal function. Pt denies LOC, syncope,fever chillls, N/V/D/C, numbness, tingling, chest pain, palpitations, lightheadness, dizziness, dysuria, urinary/bowel incontinence or any other complaints at this time. This morning she had an episode of NSVT and she said she felt palpitations.      PMH:   Congestive heart failure (CHF)  Pulmonary hypertension  Emphysema of lung  Femoral Artery Thrombosis  History of Colonoscopy with Polypectomy  History of Hysterectomy  Diverticulosis of Colon  Breast Cancer  Dyslipidemia  DM (Diabetes Mellitus)  HTN (Hypertension)  COPD (Chronic Obstructive Pulmonary Disease)      PSH:   History of tonsillectomy  S/P lumpectomy, left breast  H/O total hysterectomy      Medications:   acetaminophen   Tablet. 650 milliGRAM(s) Oral every 6 hours PRN  ALBUTerol    90 MICROgram(s) HFA Inhaler 2 Puff(s) Inhalation every 6 hours PRN  amLODIPine   Tablet 10 milliGRAM(s) Oral daily  aspirin enteric coated 81 milliGRAM(s) Oral daily  atorvastatin 20 milliGRAM(s) Oral at bedtime  benzocaine 15 mG/menthol 3.6 mG Lozenge 1 Lozenge Oral every 4 hours PRN  buDESOnide 160 MICROgram(s)/formoterol 4.5 MICROgram(s) Inhaler 2 Puff(s) Inhalation two times a day  cholecalciferol 1000 Unit(s) Oral daily  cinacalcet 30 milliGRAM(s) Oral <User Schedule>  clopidogrel Tablet 75 milliGRAM(s) Oral daily  ferrous    sulfate 325 milliGRAM(s) Oral daily  furosemide    Tablet 40 milliGRAM(s) Oral daily  heparin  Injectable 5000 Unit(s) SubCutaneous every 12 hours  hydrALAZINE 25 milliGRAM(s) Oral four times a day  loratadine 10 milliGRAM(s) Oral daily  losartan 50 milliGRAM(s) Oral daily  melatonin 3 milliGRAM(s) Oral at bedtime  metoprolol     tartrate 75 milliGRAM(s) Oral two times a day  multivitamin 1 Tablet(s) Oral daily  pantoprazole    Tablet 40 milliGRAM(s) Oral before breakfast  predniSONE   Tablet 40 milliGRAM(s) Oral daily  sertraline 100 milliGRAM(s) Oral daily      Allergies:  lisinopril (Anaphylaxis)  penicillin (Anaphylaxis)      FAMILY HISTORY:  No pertinent family history in first degree relatives      Review of Systems:  REVIEW OF SYSTEMS:    CONSTITUTIONAL: No weakness, fevers or chills  EYES/ENT: No visual changes;  No dysphagia  NECK: No pain or stiffness  RESPIRATORY: No cough, wheezing, hemoptysis; No shortness of breath  CARDIOVASCULAR: +palpitations; No lower extremity edema  GASTROINTESTINAL: No abdominal or epigastric pain. No nausea, vomiting, or hematemesis; No diarrhea or constipation. No melena or hematochezia.  BACK: No back pain  GENITOURINARY: No dysuria, frequency or hematuria  NEUROLOGICAL: No numbness or weakness  SKIN: No itching, burning, rashes, or lesions   All other review of systems is negative unless indicated above.    Physical Exam:  T(F): 98.5 (02-16), Max: 98.7 (02-15)  HR: 61 (02-16) (61 - 70)  BP: 158/56 (02-16) (138/48 - 168/64)  RR: 17 (02-16)  SpO2: 99% (02-16)  GENERAL: No acute distress, well-developed  HEAD:  Atraumatic, Normocephalic  ENT: EOMI, No JVD, moist mucosa  CHEST/LUNG: Clear to auscultation bilaterally; No wheeze, equal breath sounds bilaterally   HEART: Regular rate and rhythm; No murmurs, rubs, or gallops  ABDOMEN: Soft, Nontender, Nondistended; Bowel sounds present  EXTREMITIES:  No clubbing, cyanosis, or edema  PSYCH: Nl behavior, nl affect  NEUROLOGY: AAOx3, non-focal    Labs: Personally reviewed                        8.3    9.91  )-----------( 358      ( 16 Feb 2018 06:30 )             24.6     02-16    139  |  102  |  41<H>  ----------------------------<  134<H>  4.1   |  21<L>  |  1.53<H>    Ca    9.1      16 Feb 2018 06:30  Mg     1.9     02-16    Serum Pro-Brain Natriuretic Peptide: 4838 pg/mL (02-13 @ 14:59)    Hemoglobin A1C, Whole Blood: 5.4 % (02-14 @ 05:31)    Thyroid Stimulating Hormone, Serum: 1.98 uIU/mL (02-14 @ 05:31)      A/P:  85 yo F with h/o COPD on 3L of O2 at home, HTN, CHF, DM, HLD, diverticulosis, breast ca s/p lumpectomy and RT, presents for ADHF now with NSVT.    NSVT, symptomatic. Episode this morning with palpiations.  - Agree with increasing metoprolol  - If patient continues having NSVT, can start verapamil    Ernesto Mckoy MD  Cardiology Fellow [id number]

## 2018-02-16 NOTE — PROGRESS NOTE ADULT - PROBLEM SELECTOR PLAN 1
Patient with PAM likely hemodynamically mediated due to diuretic therapy in setting of ARB use. Will discontinue losartan and restart once renal function improves. Avoid nephrotoxins.

## 2018-02-17 LAB
BUN SERPL-MCNC: 56 MG/DL — HIGH (ref 7–23)
CALCIUM SERPL-MCNC: 9.9 MG/DL — SIGNIFICANT CHANGE UP (ref 8.4–10.5)
CHLORIDE SERPL-SCNC: 104 MMOL/L — SIGNIFICANT CHANGE UP (ref 98–107)
CO2 SERPL-SCNC: 22 MMOL/L — SIGNIFICANT CHANGE UP (ref 22–31)
CREAT SERPL-MCNC: 1.67 MG/DL — HIGH (ref 0.5–1.3)
GAS PNL BLDMV: SIGNIFICANT CHANGE UP
GAS PNL BLDMV: SIGNIFICANT CHANGE UP
GLUCOSE SERPL-MCNC: 103 MG/DL — HIGH (ref 70–99)
HCT VFR BLD CALC: 24.7 % — LOW (ref 34.5–45)
HGB BLD-MCNC: 8.5 G/DL — LOW (ref 11.5–15.5)
MAGNESIUM SERPL-MCNC: 1.8 MG/DL — SIGNIFICANT CHANGE UP (ref 1.6–2.6)
MCHC RBC-ENTMCNC: 32.2 PG — SIGNIFICANT CHANGE UP (ref 27–34)
MCHC RBC-ENTMCNC: 34.4 % — SIGNIFICANT CHANGE UP (ref 32–36)
MCV RBC AUTO: 93.6 FL — SIGNIFICANT CHANGE UP (ref 80–100)
NRBC # FLD: 0 — SIGNIFICANT CHANGE UP
PLATELET # BLD AUTO: 388 K/UL — SIGNIFICANT CHANGE UP (ref 150–400)
PMV BLD: 10.3 FL — SIGNIFICANT CHANGE UP (ref 7–13)
POTASSIUM SERPL-MCNC: 4 MMOL/L — SIGNIFICANT CHANGE UP (ref 3.5–5.3)
POTASSIUM SERPL-SCNC: 4 MMOL/L — SIGNIFICANT CHANGE UP (ref 3.5–5.3)
RBC # BLD: 2.64 M/UL — LOW (ref 3.8–5.2)
RBC # FLD: 13.2 % — SIGNIFICANT CHANGE UP (ref 10.3–14.5)
SODIUM SERPL-SCNC: 141 MMOL/L — SIGNIFICANT CHANGE UP (ref 135–145)
WBC # BLD: 15.75 K/UL — HIGH (ref 3.8–10.5)
WBC # FLD AUTO: 15.75 K/UL — HIGH (ref 3.8–10.5)

## 2018-02-17 PROCEDURE — 71250 CT THORAX DX C-: CPT | Mod: 26

## 2018-02-17 RX ORDER — FUROSEMIDE 40 MG
20 TABLET ORAL DAILY
Qty: 0 | Refills: 0 | Status: DISCONTINUED | OUTPATIENT
Start: 2018-02-17 | End: 2018-02-20

## 2018-02-17 RX ADMIN — SERTRALINE 100 MILLIGRAM(S): 25 TABLET, FILM COATED ORAL at 11:50

## 2018-02-17 RX ADMIN — Medication 3 MILLIGRAM(S): at 21:45

## 2018-02-17 RX ADMIN — Medication 1 TABLET(S): at 11:49

## 2018-02-17 RX ADMIN — CINACALCET 30 MILLIGRAM(S): 30 TABLET, FILM COATED ORAL at 12:44

## 2018-02-17 RX ADMIN — ATORVASTATIN CALCIUM 20 MILLIGRAM(S): 80 TABLET, FILM COATED ORAL at 21:46

## 2018-02-17 RX ADMIN — Medication 75 MILLIGRAM(S): at 06:13

## 2018-02-17 RX ADMIN — Medication 25 MILLIGRAM(S): at 23:54

## 2018-02-17 RX ADMIN — HEPARIN SODIUM 5000 UNIT(S): 5000 INJECTION INTRAVENOUS; SUBCUTANEOUS at 06:13

## 2018-02-17 RX ADMIN — Medication 40 MILLIGRAM(S): at 06:12

## 2018-02-17 RX ADMIN — Medication 650 MILLIGRAM(S): at 13:30

## 2018-02-17 RX ADMIN — Medication 75 MILLIGRAM(S): at 17:20

## 2018-02-17 RX ADMIN — Medication 81 MILLIGRAM(S): at 11:49

## 2018-02-17 RX ADMIN — HEPARIN SODIUM 5000 UNIT(S): 5000 INJECTION INTRAVENOUS; SUBCUTANEOUS at 17:21

## 2018-02-17 RX ADMIN — Medication 25 MILLIGRAM(S): at 11:49

## 2018-02-17 RX ADMIN — BUDESONIDE AND FORMOTEROL FUMARATE DIHYDRATE 2 PUFF(S): 160; 4.5 AEROSOL RESPIRATORY (INHALATION) at 21:46

## 2018-02-17 RX ADMIN — Medication 25 MILLIGRAM(S): at 17:20

## 2018-02-17 RX ADMIN — LORATADINE 10 MILLIGRAM(S): 10 TABLET ORAL at 11:49

## 2018-02-17 RX ADMIN — Medication 40 MILLIGRAM(S): at 06:11

## 2018-02-17 RX ADMIN — Medication 1000 UNIT(S): at 11:50

## 2018-02-17 RX ADMIN — PANTOPRAZOLE SODIUM 40 MILLIGRAM(S): 20 TABLET, DELAYED RELEASE ORAL at 06:11

## 2018-02-17 RX ADMIN — Medication 25 MILLIGRAM(S): at 06:12

## 2018-02-17 RX ADMIN — Medication 325 MILLIGRAM(S): at 11:49

## 2018-02-17 RX ADMIN — AMLODIPINE BESYLATE 10 MILLIGRAM(S): 2.5 TABLET ORAL at 06:12

## 2018-02-17 RX ADMIN — Medication 650 MILLIGRAM(S): at 12:46

## 2018-02-17 RX ADMIN — CLOPIDOGREL BISULFATE 75 MILLIGRAM(S): 75 TABLET, FILM COATED ORAL at 11:49

## 2018-02-17 RX ADMIN — BUDESONIDE AND FORMOTEROL FUMARATE DIHYDRATE 2 PUFF(S): 160; 4.5 AEROSOL RESPIRATORY (INHALATION) at 11:50

## 2018-02-17 NOTE — PROGRESS NOTE ADULT - ASSESSMENT
85 y/o F with h/o COPD, CHF, HTN, HLD, DM, breast Ca s/p left lumpectomy and RT, diverticulosis presents to the ED for shortness of breath:  1. SOB - Acute on chronic diast CHF, now appears euvolemic, changed to PO Lasix  2. Urinary retention - resolved  3. HLD - c/w statin  4. HTN - BP elevated, plan per Cardio  5. DM2 - pt not on any meds after 40lb weight loss, may start on ISS  6. COPD - Pulm appreciated, CR reviewed, steroids per Pulm, albuterol PRN  7. DVT prophylaxis  8. Anemia - Hb drop noted, no bleeding, repeat CBC later today  9. PAM - likely related to diuresis, Cr slightly uptrending, if continues to rise would hold Lasix  10. Palpitations - NSVT, EPS apreciated, c/w BB

## 2018-02-17 NOTE — PROGRESS NOTE ADULT - ATTENDING COMMENTS
2/17: rpt ct scan is suggestive of resolving pulmonary edema: follow up with rpt ct chest as an outpatient in 4 weeks:

## 2018-02-17 NOTE — PROGRESS NOTE ADULT - ATTENDING COMMENTS
Agree with above NP note.  cv stable  cont bb  decrease lasix to 20  eps eval  wbc elevated from steroid use

## 2018-02-17 NOTE — PROGRESS NOTE ADULT - PROBLEM SELECTOR PLAN 1
Patient with PAM likely hemodynamically mediated due to diuretic therapy in setting of ARB use. Recc to hold Lasix. Continue to hold losartan and can restart once renal function improves. Avoid nephrotoxins.

## 2018-02-17 NOTE — PROGRESS NOTE ADULT - PROBLEM SELECTOR PLAN 1
Interesting ct scan which has  been abnormal atleast since 2014: She is having this chronic infiltrative disease of unknown etiology? would do complete vasculitis workup and ? may need lung biopsy/;  BELEM Johansen: DDX is difficult to come u p with : do ins /exp ct to deermine air trapping, pt did get radiation to left breast in 1998 : but this disease is bilateral diffuse: she was on tamoxifen for 5 yrs but no other chemotherapy: There is no hx of recurrent bleeding in lung too: will follow  2/17 chest CT read as" most compatible with resolving pulmonary edema rather than infection or interstitial lung disease. repeat CT chest in 4 wks per radiology recommendation

## 2018-02-17 NOTE — PROGRESS NOTE ADULT - ASSESSMENT
83 yo F with h/o COPD on 3L of O2 at home, HTN, CHF, DM, HLD, diverticulosis, breast ca s/p lumpectomy and RT, presents for ADHF now with NSVT. NSVT, symptomatic. Episode this morning with palpiations.    - no further WCT or NSVT on telemetry  - continue Lopressor 75mg BID  - CHF management as per primary cardiology team      Onur Morris MD  Spectra [phone] / Pager [phone]  After 1 PM, call Spectra [phone]

## 2018-02-17 NOTE — PROGRESS NOTE ADULT - ATTENDING COMMENTS
[facility] NEPHROLOGY  Elliott Beltran M.D.  Marco Antonio Fernando D.O.  Tracie Mcfadden M.D.  Britney Marie, MSN, ANP-C    Telephone: [phone]  Facsimile: [phone]    [address] [place] [address]

## 2018-02-18 LAB
B PERT DNA SPEC QL NAA+PROBE: SIGNIFICANT CHANGE UP
BUN SERPL-MCNC: 61 MG/DL — HIGH (ref 7–23)
C PNEUM DNA SPEC QL NAA+PROBE: NOT DETECTED — SIGNIFICANT CHANGE UP
CALCIUM SERPL-MCNC: 9.4 MG/DL — SIGNIFICANT CHANGE UP (ref 8.4–10.5)
CHLORIDE SERPL-SCNC: 106 MMOL/L — SIGNIFICANT CHANGE UP (ref 98–107)
CO2 SERPL-SCNC: 23 MMOL/L — SIGNIFICANT CHANGE UP (ref 22–31)
CREAT SERPL-MCNC: 1.58 MG/DL — HIGH (ref 0.5–1.3)
FLUAV H1 2009 PAND RNA SPEC QL NAA+PROBE: NOT DETECTED — SIGNIFICANT CHANGE UP
FLUAV H1 RNA SPEC QL NAA+PROBE: NOT DETECTED — SIGNIFICANT CHANGE UP
FLUAV H3 RNA SPEC QL NAA+PROBE: NOT DETECTED — SIGNIFICANT CHANGE UP
FLUAV SUBTYP SPEC NAA+PROBE: SIGNIFICANT CHANGE UP
FLUBV RNA SPEC QL NAA+PROBE: NOT DETECTED — SIGNIFICANT CHANGE UP
GLUCOSE SERPL-MCNC: 106 MG/DL — HIGH (ref 70–99)
HADV DNA SPEC QL NAA+PROBE: NOT DETECTED — SIGNIFICANT CHANGE UP
HCOV 229E RNA SPEC QL NAA+PROBE: NOT DETECTED — SIGNIFICANT CHANGE UP
HCOV HKU1 RNA SPEC QL NAA+PROBE: NOT DETECTED — SIGNIFICANT CHANGE UP
HCOV NL63 RNA SPEC QL NAA+PROBE: NOT DETECTED — SIGNIFICANT CHANGE UP
HCOV OC43 RNA SPEC QL NAA+PROBE: NOT DETECTED — SIGNIFICANT CHANGE UP
HCT VFR BLD CALC: 25.6 % — LOW (ref 34.5–45)
HGB BLD-MCNC: 8.8 G/DL — LOW (ref 11.5–15.5)
HMPV RNA SPEC QL NAA+PROBE: NOT DETECTED — SIGNIFICANT CHANGE UP
HPIV1 RNA SPEC QL NAA+PROBE: NOT DETECTED — SIGNIFICANT CHANGE UP
HPIV2 RNA SPEC QL NAA+PROBE: NOT DETECTED — SIGNIFICANT CHANGE UP
HPIV3 RNA SPEC QL NAA+PROBE: NOT DETECTED — SIGNIFICANT CHANGE UP
HPIV4 RNA SPEC QL NAA+PROBE: NOT DETECTED — SIGNIFICANT CHANGE UP
M PNEUMO DNA SPEC QL NAA+PROBE: NOT DETECTED — SIGNIFICANT CHANGE UP
MAGNESIUM SERPL-MCNC: 1.7 MG/DL — SIGNIFICANT CHANGE UP (ref 1.6–2.6)
MCHC RBC-ENTMCNC: 32.4 PG — SIGNIFICANT CHANGE UP (ref 27–34)
MCHC RBC-ENTMCNC: 34.4 % — SIGNIFICANT CHANGE UP (ref 32–36)
MCV RBC AUTO: 94.1 FL — SIGNIFICANT CHANGE UP (ref 80–100)
NRBC # FLD: 0 — SIGNIFICANT CHANGE UP
PLATELET # BLD AUTO: 393 K/UL — SIGNIFICANT CHANGE UP (ref 150–400)
PMV BLD: 10.5 FL — SIGNIFICANT CHANGE UP (ref 7–13)
POTASSIUM SERPL-MCNC: 4.1 MMOL/L — SIGNIFICANT CHANGE UP (ref 3.5–5.3)
POTASSIUM SERPL-SCNC: 4.1 MMOL/L — SIGNIFICANT CHANGE UP (ref 3.5–5.3)
RBC # BLD: 2.72 M/UL — LOW (ref 3.8–5.2)
RBC # FLD: 13.4 % — SIGNIFICANT CHANGE UP (ref 10.3–14.5)
RSV RNA SPEC QL NAA+PROBE: NOT DETECTED — SIGNIFICANT CHANGE UP
RV+EV RNA SPEC QL NAA+PROBE: NOT DETECTED — SIGNIFICANT CHANGE UP
SODIUM SERPL-SCNC: 142 MMOL/L — SIGNIFICANT CHANGE UP (ref 135–145)
WBC # BLD: 18.86 K/UL — HIGH (ref 3.8–10.5)
WBC # FLD AUTO: 18.86 K/UL — HIGH (ref 3.8–10.5)

## 2018-02-18 RX ORDER — HYDRALAZINE HCL 50 MG
50 TABLET ORAL THREE TIMES A DAY
Qty: 0 | Refills: 0 | Status: DISCONTINUED | OUTPATIENT
Start: 2018-02-18 | End: 2018-02-19

## 2018-02-18 RX ADMIN — CLOPIDOGREL BISULFATE 75 MILLIGRAM(S): 75 TABLET, FILM COATED ORAL at 12:23

## 2018-02-18 RX ADMIN — LORATADINE 10 MILLIGRAM(S): 10 TABLET ORAL at 12:23

## 2018-02-18 RX ADMIN — BENZOCAINE AND MENTHOL 1 LOZENGE: 5; 1 LIQUID ORAL at 10:00

## 2018-02-18 RX ADMIN — Medication 81 MILLIGRAM(S): at 12:23

## 2018-02-18 RX ADMIN — Medication 50 MILLIGRAM(S): at 22:20

## 2018-02-18 RX ADMIN — CINACALCET 30 MILLIGRAM(S): 30 TABLET, FILM COATED ORAL at 10:00

## 2018-02-18 RX ADMIN — Medication 3 MILLIGRAM(S): at 22:20

## 2018-02-18 RX ADMIN — Medication 25 MILLIGRAM(S): at 05:46

## 2018-02-18 RX ADMIN — Medication 75 MILLIGRAM(S): at 05:46

## 2018-02-18 RX ADMIN — Medication 20 MILLIGRAM(S): at 05:46

## 2018-02-18 RX ADMIN — AMLODIPINE BESYLATE 10 MILLIGRAM(S): 2.5 TABLET ORAL at 05:46

## 2018-02-18 RX ADMIN — BUDESONIDE AND FORMOTEROL FUMARATE DIHYDRATE 2 PUFF(S): 160; 4.5 AEROSOL RESPIRATORY (INHALATION) at 08:58

## 2018-02-18 RX ADMIN — BUDESONIDE AND FORMOTEROL FUMARATE DIHYDRATE 2 PUFF(S): 160; 4.5 AEROSOL RESPIRATORY (INHALATION) at 22:20

## 2018-02-18 RX ADMIN — Medication 1 TABLET(S): at 12:23

## 2018-02-18 RX ADMIN — Medication 40 MILLIGRAM(S): at 05:46

## 2018-02-18 RX ADMIN — Medication 50 MILLIGRAM(S): at 12:24

## 2018-02-18 RX ADMIN — Medication 75 MILLIGRAM(S): at 17:03

## 2018-02-18 RX ADMIN — Medication 325 MILLIGRAM(S): at 12:23

## 2018-02-18 RX ADMIN — Medication 1000 UNIT(S): at 12:23

## 2018-02-18 RX ADMIN — HEPARIN SODIUM 5000 UNIT(S): 5000 INJECTION INTRAVENOUS; SUBCUTANEOUS at 17:03

## 2018-02-18 RX ADMIN — HEPARIN SODIUM 5000 UNIT(S): 5000 INJECTION INTRAVENOUS; SUBCUTANEOUS at 05:47

## 2018-02-18 RX ADMIN — ATORVASTATIN CALCIUM 20 MILLIGRAM(S): 80 TABLET, FILM COATED ORAL at 22:20

## 2018-02-18 RX ADMIN — PANTOPRAZOLE SODIUM 40 MILLIGRAM(S): 20 TABLET, DELAYED RELEASE ORAL at 05:47

## 2018-02-18 RX ADMIN — SERTRALINE 100 MILLIGRAM(S): 25 TABLET, FILM COATED ORAL at 12:23

## 2018-02-18 NOTE — PROGRESS NOTE ADULT - PROBLEM SELECTOR PLAN 1
Interesting ct scan which has  been abnormal atleast since 2014: She is having this chronic infiltrative disease of unknown etiology? would do complete vasculitis workup and ? may need lung biopsy/;  BELEM Johansen: DDX is difficult to come u p with : do ins /exp ct to deermine air trapping, pt did get radiation to left breast in 1998 : but this disease is bilateral diffuse: she was on tamoxifen for 5 yrs but no other chemotherapy: There is no hx of recurrent bleeding in lung too: will follow  2/17 chest CT read as" most compatible with resolving pulmonary edema rather than infection or interstitial lung disease. repeat CT chest in 4 wks per radiology recommendation  2/18 follow up CT chest in 4 wks per radiology recommendation

## 2018-02-18 NOTE — PROGRESS NOTE ADULT - ATTENDING COMMENTS
Agree with above NP note.  cv stable  less ectopy  cont bb as ordered  lasix as ordered  sbp elevated  inc hydral  next will inc bb  med/pulmo f/u

## 2018-02-18 NOTE — PROGRESS NOTE ADULT - ATTENDING COMMENTS
[facility] NEPHROLOGY  Elliott Beltran M.D.  Marco Antonio Fernando D.O.  Tracie Mcfadden M.D.  Britney Marie, MSN, ANP-C    Telephone: [phone]  Facsimile: [phone]    [address] [place] [address]

## 2018-02-18 NOTE — PROGRESS NOTE ADULT - PROBLEM SELECTOR PLAN 1
Patient with PAM likely hemodynamically mediated due to diuretic therapy in setting of ARB use. Renal function improving with decreased dose of Lasix to 20mg PO daily. Continue to hold losartan and can restart once renal function improves. Avoid nephrotoxins. Avoid Maalox

## 2018-02-18 NOTE — PROGRESS NOTE ADULT - ASSESSMENT
85 y/o F with h/o COPD on 3L of O2 at home, HTN, DCHF, DM, HLD, diverticulosis, breast ca s/p lumpectomy and RT, presents  with  urinary retention  and shortness of breath    1. acute on chronic DCHF  euvolemic   echo with lv intracav gradient without sig AS, normal LV sys fx   continue lasix 20 mg PO daily , creat decreasing    monitor creat levels   continue with BB     2.  Mild AS/  Moderate aortic regurgitation.   echo with lv intracav gradient without sig AS  continue with diuretics  less ectopy on tele   EPs eval appreciated   continue with metoprolol  75mg BID to dec inotropy , and decrease ectopy noted on tele     3. HTN   elevated BP   increase hydralazine to 50 mg TID   continue with current anti-htn meds     4. PAM   renal f.u   creat level decreasing   losartan d/c   continue with  lasix   monitor creat levels    5.   COPD   po steroids   repeat CT chest revealing decrease pleural effusions, bl groundglass opacities, 2 mm nodule in RUL   pulm f/u    6. Atypical chest pain   described worse  after eating   Maloox prn   check ekg   continue with PPI      7. Leukocytosis   check BC   Ct chest results as mentioned above 85 y/o F with h/o COPD on 3L of O2 at home, HTN, DCHF, DM, HLD, diverticulosis, breast ca s/p lumpectomy and RT, presents  with  urinary retention  and shortness of breath    1. acute on chronic DCHF  euvolemic   echo with lv intracav gradient without sig AS, normal LV sys fx   continue lasix 20 mg PO daily , creat decreasing    monitor creat levels   continue with BB     2.  Mild AS/  Moderate aortic regurgitation.   echo with lv intracav gradient without sig AS  continue with diuretics  less ectopy on tele   EPs eval appreciated   continue with metoprolol  75mg BID to dec inotropy , and decrease ectopy noted on tele     3. HTN   elevated BP   increase hydralazine to 50 mg TID   continue with current anti-htn meds     4. PAM   renal f.u   creat level decreasing   losartan d/c   continue with  lasix   monitor creat levels    5.   COPD   po steroids   repeat CT chest revealing decrease pleural effusions, bl groundglass opacities, 2 mm nodule in RUL   pulm f/u    6. Atypical chest pain   described worse  after eating   Maloox prn   check ekg   continue with PPI      7. Leukocytosis   on po steroids   Ct chest results as mentioned above   check RVP 85 y/o F with h/o COPD on 3L of O2 at home, HTN, DCHF, DM, HLD, diverticulosis, breast ca s/p lumpectomy and RT, presents  with  urinary retention  and shortness of breath    1. acute on chronic DCHF  euvolemic   echo with lv intracav gradient without sig AS, normal LV sys fx   continue lasix 20 mg PO daily , creat decreasing    monitor creat levels   continue with BB     2.  Mild AS/  Moderate aortic regurgitation.   echo with lv intracav gradient without sig AS  continue with diuretics  less ectopy on tele   EPs eval appreciated   continue with metoprolol  75mg BID to dec inotropy , and decrease ectopy noted on tele     3. HTN   elevated BP   increase hydralazine to 50 mg TID   continue with current anti-htn meds     4. PAM   renal f.u   creat level decreasing   losartan d/c   continue with  lasix   monitor creat levels    5.   COPD   po steroids   repeat CT chest revealing decrease pleural effusions, bl groundglass opacities, 2 mm nodule in RUL   pulm f/u    6. Atypical chest pain   likely GI related vs secondary uncontrolled HTN   described worse  after eating   Maloox prn   check ekg   continue with PPI    hydral increased     7. Leukocytosis   on po steroids   continue to trend WBC   Ct chest results as mentioned above   check RVP

## 2018-02-18 NOTE — PROGRESS NOTE ADULT - ASSESSMENT
85 y/o F with h/o COPD, CHF, HTN, HLD, DM, breast Ca s/p left lumpectomy and RT, diverticulosis presents to the ED for shortness of breath:  1. SOB - Acute on chronic diast CHF, now appears euvolemic, changed to PO Lasix  2. Urinary retention - resolved  3. HLD - c/w statin  4. HTN - BP elevated, plan per Cardio  5. DM2 - pt not on any meds after 40lb weight loss, may start on ISS  6. COPD - Pulm appreciated, CT reviewed, steroids per Pulm, albuterol PRN  7. DVT prophylaxis  8. Anemia - Hb drop noted, no bleeding, repeat CBC later today  9. PAM - Nephro following, Cr slightly better  10. Palpitations - NSVT, EPS apreciated, c/w BB  11. Leukocytosis - likely 2/2 steroids, no signs of infection

## 2018-02-19 LAB
BUN SERPL-MCNC: 53 MG/DL — HIGH (ref 7–23)
CALCIUM SERPL-MCNC: 9.5 MG/DL — SIGNIFICANT CHANGE UP (ref 8.4–10.5)
CHLORIDE SERPL-SCNC: 105 MMOL/L — SIGNIFICANT CHANGE UP (ref 98–107)
CO2 SERPL-SCNC: 22 MMOL/L — SIGNIFICANT CHANGE UP (ref 22–31)
CREAT SERPL-MCNC: 1.5 MG/DL — HIGH (ref 0.5–1.3)
GLUCOSE SERPL-MCNC: 129 MG/DL — HIGH (ref 70–99)
HCT VFR BLD CALC: 24.8 % — LOW (ref 34.5–45)
HGB BLD-MCNC: 8.1 G/DL — LOW (ref 11.5–15.5)
MAGNESIUM SERPL-MCNC: 1.6 MG/DL — SIGNIFICANT CHANGE UP (ref 1.6–2.6)
MCHC RBC-ENTMCNC: 30.7 PG — SIGNIFICANT CHANGE UP (ref 27–34)
MCHC RBC-ENTMCNC: 32.7 % — SIGNIFICANT CHANGE UP (ref 32–36)
MCV RBC AUTO: 93.9 FL — SIGNIFICANT CHANGE UP (ref 80–100)
NRBC # FLD: 0 — SIGNIFICANT CHANGE UP
PLATELET # BLD AUTO: 387 K/UL — SIGNIFICANT CHANGE UP (ref 150–400)
PMV BLD: 10.6 FL — SIGNIFICANT CHANGE UP (ref 7–13)
POTASSIUM SERPL-MCNC: 4.4 MMOL/L — SIGNIFICANT CHANGE UP (ref 3.5–5.3)
POTASSIUM SERPL-SCNC: 4.4 MMOL/L — SIGNIFICANT CHANGE UP (ref 3.5–5.3)
RBC # BLD: 2.64 M/UL — LOW (ref 3.8–5.2)
RBC # FLD: 13.5 % — SIGNIFICANT CHANGE UP (ref 10.3–14.5)
SODIUM SERPL-SCNC: 141 MMOL/L — SIGNIFICANT CHANGE UP (ref 135–145)
WBC # BLD: 18.05 K/UL — HIGH (ref 3.8–10.5)
WBC # FLD AUTO: 18.05 K/UL — HIGH (ref 3.8–10.5)

## 2018-02-19 RX ORDER — ISOSORBIDE MONONITRATE 60 MG/1
30 TABLET, EXTENDED RELEASE ORAL DAILY
Qty: 0 | Refills: 0 | Status: DISCONTINUED | OUTPATIENT
Start: 2018-02-19 | End: 2018-02-22

## 2018-02-19 RX ORDER — HYDRALAZINE HCL 50 MG
75 TABLET ORAL THREE TIMES A DAY
Qty: 0 | Refills: 0 | Status: DISCONTINUED | OUTPATIENT
Start: 2018-02-19 | End: 2018-02-25

## 2018-02-19 RX ORDER — FUROSEMIDE 40 MG
20 TABLET ORAL ONCE
Qty: 0 | Refills: 0 | Status: COMPLETED | OUTPATIENT
Start: 2018-02-19 | End: 2018-02-19

## 2018-02-19 RX ADMIN — BUDESONIDE AND FORMOTEROL FUMARATE DIHYDRATE 2 PUFF(S): 160; 4.5 AEROSOL RESPIRATORY (INHALATION) at 21:06

## 2018-02-19 RX ADMIN — Medication 650 MILLIGRAM(S): at 14:43

## 2018-02-19 RX ADMIN — Medication 20 MILLIGRAM(S): at 13:10

## 2018-02-19 RX ADMIN — LORATADINE 10 MILLIGRAM(S): 10 TABLET ORAL at 11:31

## 2018-02-19 RX ADMIN — ATORVASTATIN CALCIUM 20 MILLIGRAM(S): 80 TABLET, FILM COATED ORAL at 21:06

## 2018-02-19 RX ADMIN — Medication 650 MILLIGRAM(S): at 21:23

## 2018-02-19 RX ADMIN — Medication 1000 UNIT(S): at 11:32

## 2018-02-19 RX ADMIN — Medication 81 MILLIGRAM(S): at 11:29

## 2018-02-19 RX ADMIN — Medication 650 MILLIGRAM(S): at 15:42

## 2018-02-19 RX ADMIN — Medication 1 TABLET(S): at 11:31

## 2018-02-19 RX ADMIN — Medication 75 MILLIGRAM(S): at 13:09

## 2018-02-19 RX ADMIN — SERTRALINE 100 MILLIGRAM(S): 25 TABLET, FILM COATED ORAL at 11:31

## 2018-02-19 RX ADMIN — Medication 75 MILLIGRAM(S): at 21:08

## 2018-02-19 RX ADMIN — Medication 75 MILLIGRAM(S): at 17:43

## 2018-02-19 RX ADMIN — Medication 75 MILLIGRAM(S): at 05:15

## 2018-02-19 RX ADMIN — Medication 3 MILLIGRAM(S): at 21:06

## 2018-02-19 RX ADMIN — Medication 325 MILLIGRAM(S): at 11:31

## 2018-02-19 RX ADMIN — HEPARIN SODIUM 5000 UNIT(S): 5000 INJECTION INTRAVENOUS; SUBCUTANEOUS at 17:43

## 2018-02-19 RX ADMIN — CLOPIDOGREL BISULFATE 75 MILLIGRAM(S): 75 TABLET, FILM COATED ORAL at 11:31

## 2018-02-19 RX ADMIN — Medication 650 MILLIGRAM(S): at 22:00

## 2018-02-19 RX ADMIN — Medication 20 MILLIGRAM(S): at 05:14

## 2018-02-19 RX ADMIN — PANTOPRAZOLE SODIUM 40 MILLIGRAM(S): 20 TABLET, DELAYED RELEASE ORAL at 05:15

## 2018-02-19 RX ADMIN — HEPARIN SODIUM 5000 UNIT(S): 5000 INJECTION INTRAVENOUS; SUBCUTANEOUS at 05:14

## 2018-02-19 RX ADMIN — Medication 40 MILLIGRAM(S): at 05:15

## 2018-02-19 RX ADMIN — Medication 50 MILLIGRAM(S): at 05:14

## 2018-02-19 RX ADMIN — ISOSORBIDE MONONITRATE 30 MILLIGRAM(S): 60 TABLET, EXTENDED RELEASE ORAL at 13:09

## 2018-02-19 RX ADMIN — AMLODIPINE BESYLATE 10 MILLIGRAM(S): 2.5 TABLET ORAL at 05:14

## 2018-02-19 RX ADMIN — BUDESONIDE AND FORMOTEROL FUMARATE DIHYDRATE 2 PUFF(S): 160; 4.5 AEROSOL RESPIRATORY (INHALATION) at 11:32

## 2018-02-19 NOTE — PROGRESS NOTE ADULT - ASSESSMENT
85 y/o F with h/o COPD on 3L of O2 at home, HTN, DCHF, DM, HLD, diverticulosis, breast ca s/p lumpectomy and RT, presents  with  urinary retention  and shortness of breath    1. acute on chronic DCHF  appears near euvolemia  echo with lv intracav gradient without sig AS, normal LV sys fx   still with occasional episodes of dyspnea  ? from chronic lung disease  ?  from lvot gradient and elevated sbp  ? pulmonary edema although volume status improved  will try an additional lasix dose  want to avoid overdiuresing in setting of pam and lvot gradient   continue lasix 20 mg PO daily  monitor creat levels   continue with BB     2.  Mild AS/  Moderate aortic regurgitation.   echo with lv intracav gradient without sig AS  continue with diuretics  continue with metoprolol  75mg BID      3. HTN, uncontrolled   cont hydralazine, inc to 75mg TID   continue with norvasc, bb  add imdur 30mg daily     4. PAM   renal f.u   creat level stable     5.   COPD   po steroids   repeat CT chest revealing decrease pleural effusions, bl groundglass opacities, 2 mm nodule in RUL   pulm f/u    6. Leukocytosis   on po steroids   continue to trend WBC   Ct chest results as mentioned above   RVP negative

## 2018-02-19 NOTE — PROGRESS NOTE ADULT - ASSESSMENT
83 yo F with h/o COPD on 3L of O2 at home, HTN, CHF, DM, HLD, diverticulosis, breast ca s/p lumpectomy and RT, presents for ADHF now with NSVT. NSVT, symptomatic. Episode this morning with palpiations. No further WCT on tele since 2/17    - no further WCT or NSVT on telemetry  - continue Lopressor 75mg BID  - stable from EP standpoint      Onur Morris MD  Spectra [phone] / Pager [phone]  After 1 PM, call Spectra [phone]

## 2018-02-19 NOTE — PROGRESS NOTE ADULT - ASSESSMENT
83 y/o F with h/o COPD, CHF, HTN, HLD, DM, breast Ca s/p left lumpectomy and RT, diverticulosis presents to the ED for shortness of breath:  1. SOB - Acute on chronic diast CHF, now appears euvolemic, Lasix per Cardio  2. Urinary retention - resolved  3. HLD - c/w statin  4. HTN - BP elevated, plan per Cardio  5. DM2 - pt not on any meds after 40lb weight loss, may start on ISS  6. COPD - Pulm appreciated, repeat CT chest with improved effusions, will need repeat in 4weeks for nodule f/u, c/w steroids/inhalers per Pulm  7. DVT prophylaxis  8. Anemia - Hb stable  9. PAM - Nephro following, Cr improving  10. Palpitations - NSVT, EPS apreciated, c/w BB  11. Leukocytosis - likely 2/2 steroids, no signs of infection, RVP neg

## 2018-02-19 NOTE — PROGRESS NOTE ADULT - PROBLEM SELECTOR PLAN 1
Patient with PAM likely hemodynamically mediated due to diuretic therapy in setting of ARB use. Renal function improving with decreased dose of Lasix to 20mg PO daily. Continue to hold losartan and can restart once renal function improves. Avoid nephrotoxins. Avoid Maalox. Pt c/o decreased urine o/p and SOB- check bladder scan, repeat CXR and strict I/Os.

## 2018-02-19 NOTE — PROGRESS NOTE ADULT - PROBLEM SELECTOR PLAN 1
Interesting ct scan which has  been abnormal at least since 2014: She is having this chronic infiltrative disease of unknown etiology? would do complete vasculitis workup and ? may need lung biopsy/;  BELEM Johansen: DDX is difficult to come u p with : do ins /exp ct to deermine air trapping, pt did get radiation to left breast in 1998 : but this disease is bilateral diffuse: she was on tamoxifen for 5 yrs but no other chemotherapy: There is no hx of recurrent bleeding in lung too: will follow  2/17 chest CT read as" most compatible with resolving pulmonary edema rather than infection or interstitial lung disease. repeat CT chest in 4 wks per radiology recommendation  2/18 follow up CT chest in 4 wks per radiology recommendation  2/19: doing ok : NO SOb AT THIS TIME: But she keeps getting episodes of SOB : The rpt ct scan is suggestive of pulmonary edema:

## 2018-02-19 NOTE — PROGRESS NOTE ADULT - ATTENDING COMMENTS
2/18: ct scan chest reviewed: dw pt in detail too!  2/19: keeps getting episodes of SOB , on lasix 20 mg : but she has PAM : She has LVOT: ON GOOD DOSES OF B BLOCKER

## 2018-02-19 NOTE — PROGRESS NOTE ADULT - ATTENDING COMMENTS
[facility] NEPHROLOGY  Elliott Beltran M.D.  Marco Antonio Fernando D.O.  Tracie Mcfadden M.D.  Britney Marie, MSN, ANP-C    Telephone: [phone]  Facsimile: [phone]    [address] [place] [address]

## 2018-02-20 DIAGNOSIS — D72.829 ELEVATED WHITE BLOOD CELL COUNT, UNSPECIFIED: ICD-10-CM

## 2018-02-20 DIAGNOSIS — R91.1 SOLITARY PULMONARY NODULE: ICD-10-CM

## 2018-02-20 LAB
BUN SERPL-MCNC: 63 MG/DL — HIGH (ref 7–23)
CALCIUM SERPL-MCNC: 9.6 MG/DL — SIGNIFICANT CHANGE UP (ref 8.4–10.5)
CHLORIDE SERPL-SCNC: 107 MMOL/L — SIGNIFICANT CHANGE UP (ref 98–107)
CO2 SERPL-SCNC: 21 MMOL/L — LOW (ref 22–31)
CREAT SERPL-MCNC: 1.79 MG/DL — HIGH (ref 0.5–1.3)
GLUCOSE SERPL-MCNC: 114 MG/DL — HIGH (ref 70–99)
HCT VFR BLD CALC: 23.6 % — LOW (ref 34.5–45)
HGB BLD-MCNC: 8 G/DL — LOW (ref 11.5–15.5)
MCHC RBC-ENTMCNC: 32.1 PG — SIGNIFICANT CHANGE UP (ref 27–34)
MCHC RBC-ENTMCNC: 33.9 % — SIGNIFICANT CHANGE UP (ref 32–36)
MCV RBC AUTO: 94.8 FL — SIGNIFICANT CHANGE UP (ref 80–100)
NRBC # FLD: 0 — SIGNIFICANT CHANGE UP
PLATELET # BLD AUTO: 378 K/UL — SIGNIFICANT CHANGE UP (ref 150–400)
PMV BLD: 10.9 FL — SIGNIFICANT CHANGE UP (ref 7–13)
POTASSIUM SERPL-MCNC: 4.1 MMOL/L — SIGNIFICANT CHANGE UP (ref 3.5–5.3)
POTASSIUM SERPL-SCNC: 4.1 MMOL/L — SIGNIFICANT CHANGE UP (ref 3.5–5.3)
RBC # BLD: 2.49 M/UL — LOW (ref 3.8–5.2)
RBC # FLD: 13.8 % — SIGNIFICANT CHANGE UP (ref 10.3–14.5)
SODIUM SERPL-SCNC: 145 MMOL/L — SIGNIFICANT CHANGE UP (ref 135–145)
WBC # BLD: 20.1 K/UL — HIGH (ref 3.8–10.5)
WBC # FLD AUTO: 20.1 K/UL — HIGH (ref 3.8–10.5)

## 2018-02-20 PROCEDURE — 93010 ELECTROCARDIOGRAM REPORT: CPT

## 2018-02-20 PROCEDURE — 99232 SBSQ HOSP IP/OBS MODERATE 35: CPT

## 2018-02-20 PROCEDURE — 71045 X-RAY EXAM CHEST 1 VIEW: CPT | Mod: 26

## 2018-02-20 RX ORDER — METOPROLOL TARTRATE 50 MG
100 TABLET ORAL
Qty: 0 | Refills: 0 | Status: DISCONTINUED | OUTPATIENT
Start: 2018-02-20 | End: 2018-02-25

## 2018-02-20 RX ORDER — FUROSEMIDE 40 MG
20 TABLET ORAL
Qty: 0 | Refills: 0 | Status: DISCONTINUED | OUTPATIENT
Start: 2018-02-22 | End: 2018-02-25

## 2018-02-20 RX ADMIN — Medication 75 MILLIGRAM(S): at 05:30

## 2018-02-20 RX ADMIN — ATORVASTATIN CALCIUM 20 MILLIGRAM(S): 80 TABLET, FILM COATED ORAL at 22:13

## 2018-02-20 RX ADMIN — ISOSORBIDE MONONITRATE 30 MILLIGRAM(S): 60 TABLET, EXTENDED RELEASE ORAL at 13:03

## 2018-02-20 RX ADMIN — BUDESONIDE AND FORMOTEROL FUMARATE DIHYDRATE 2 PUFF(S): 160; 4.5 AEROSOL RESPIRATORY (INHALATION) at 22:13

## 2018-02-20 RX ADMIN — BUDESONIDE AND FORMOTEROL FUMARATE DIHYDRATE 2 PUFF(S): 160; 4.5 AEROSOL RESPIRATORY (INHALATION) at 10:17

## 2018-02-20 RX ADMIN — Medication 650 MILLIGRAM(S): at 10:57

## 2018-02-20 RX ADMIN — PANTOPRAZOLE SODIUM 40 MILLIGRAM(S): 20 TABLET, DELAYED RELEASE ORAL at 05:30

## 2018-02-20 RX ADMIN — AMLODIPINE BESYLATE 10 MILLIGRAM(S): 2.5 TABLET ORAL at 05:30

## 2018-02-20 RX ADMIN — Medication 100 MILLIGRAM(S): at 18:05

## 2018-02-20 RX ADMIN — Medication 75 MILLIGRAM(S): at 13:03

## 2018-02-20 RX ADMIN — Medication 20 MILLIGRAM(S): at 05:32

## 2018-02-20 RX ADMIN — Medication 650 MILLIGRAM(S): at 11:57

## 2018-02-20 RX ADMIN — Medication 1 TABLET(S): at 13:03

## 2018-02-20 RX ADMIN — Medication 3 MILLIGRAM(S): at 22:13

## 2018-02-20 RX ADMIN — Medication 81 MILLIGRAM(S): at 13:03

## 2018-02-20 RX ADMIN — LORATADINE 10 MILLIGRAM(S): 10 TABLET ORAL at 13:03

## 2018-02-20 RX ADMIN — Medication 1000 UNIT(S): at 13:04

## 2018-02-20 RX ADMIN — Medication 75 MILLIGRAM(S): at 22:13

## 2018-02-20 RX ADMIN — SERTRALINE 100 MILLIGRAM(S): 25 TABLET, FILM COATED ORAL at 13:04

## 2018-02-20 RX ADMIN — HEPARIN SODIUM 5000 UNIT(S): 5000 INJECTION INTRAVENOUS; SUBCUTANEOUS at 18:05

## 2018-02-20 RX ADMIN — Medication 325 MILLIGRAM(S): at 13:03

## 2018-02-20 RX ADMIN — HEPARIN SODIUM 5000 UNIT(S): 5000 INJECTION INTRAVENOUS; SUBCUTANEOUS at 05:30

## 2018-02-20 RX ADMIN — CINACALCET 30 MILLIGRAM(S): 30 TABLET, FILM COATED ORAL at 10:16

## 2018-02-20 RX ADMIN — CLOPIDOGREL BISULFATE 75 MILLIGRAM(S): 75 TABLET, FILM COATED ORAL at 13:03

## 2018-02-20 NOTE — PROGRESS NOTE ADULT - ASSESSMENT
83 y/o F with h/o COPD, CHF, HTN, HLD, DM, breast Ca s/p left lumpectomy and RT, diverticulosis presents to the ED for shortness of breath:  1. SOB - Acute on chronic diast CHF, now appears euvolemic, transient episodes of SOB, CT w/Pulm edema, given extra Lasix yesterday with some improvement, c/w PO Lasix per Cardio  2.  HLD - c/w statin  3. HTN - BP elevated, plan per Cardio  4. COPD - Pulm appreciated, will need repeat CT chest in 4weeks for nodule f/u, c/w inhalers per Pulm, completed steroids  5. DVT prophylaxis  6. Anemia - Hb stable  7. PAM - Cr up today, likely 2/2 extra Lasix yesterday, Nephro f/u  8. Palpitations - NSVT, EPS apreciated, c/w BB  9. Leukocytosis - likely 2/2 steroids, no signs of infection, RVP neg, monitor for improvement off steroids

## 2018-02-20 NOTE — PROGRESS NOTE ADULT - PROBLEM SELECTOR PLAN 1
Patient with PAM likely hemodynamically mediated due to diuretic therapy in setting of ARB use. Scr increased today likely due to IV lasix on 2/19. Continue to hold losartan and can restart once renal function improves. Avoid nephrotoxins.

## 2018-02-20 NOTE — PROGRESS NOTE ADULT - ATTENDING COMMENTS
Patient seen and examined, agree with the above assessment and plan by STACIE Stevenson.  Increase metoprolol to 100mg PO BID  Change lasix to 20mg Q48 hrs

## 2018-02-20 NOTE — PROGRESS NOTE ADULT - ATTENDING COMMENTS
[facility] NEPHROLOGY  Elliott Beltran M.D.  Marco Antonio Fernando D.O.  Tracie Mcfadden M.D.  Britney Marie, MSN, ANP-C    Telephone: [phone]  Facsimile: [phone]    [address] [place] [address]

## 2018-02-20 NOTE — PROGRESS NOTE ADULT - ASSESSMENT
83 y/o F with h/o COPD on 3L of O2 at home, HTN, DCHF, DM, HLD, diverticulosis, breast ca s/p lumpectomy and RT, presents  with  urinary retention  and shortness of breath    1. acute on chronic DCHF  appears near euvolemia  echo with lv intracav gradient without sig AS, normal LV sys fx   still with occasional episodes of  chest pressure   imdur started yesterday   dyspnea resolved today   ? from chronic lung disease  ?  from lvot gradient and elevated sbp  ? pulmonary edema although volume status improved  want to avoid overdiuresing in setting of pam and lvot gradient   continue lasix 20 mg PO daily  monitor creat levels   continue with BB     2.  Mild AS/  Moderate aortic regurgitation.   echo with lv intracav gradient without sig AS  continue with diuretics  continue with metoprolol  75mg BID      3. HTN, uncontrolled   slowly improving today   cont hydralazine 75mg TID   continue with norvasc, bb  continue with imdur 30mg daily , if bp remains elevated increase imdur to 60 mg daily     4. PAM   renal f.u   creat level increased today in the setting of extra diuresis   continue to trend     5.   COPD   po steroids   repeat CT chest revealing decrease pleural effusions, bl groundglass opacities, 2 mm nodule in RUL   pulm f/u    6. Leukocytosis   on po steroids   continue to trend WBC   Ct chest results as mentioned above   RVP negative 83 y/o F with h/o COPD on 3L of O2 at home, HTN, DCHF, DM, HLD, diverticulosis, breast ca s/p lumpectomy and RT, presents  with  urinary retention  and shortness of breath    1. acute on chronic DCHF  appears near euvolemia  echo with lv intracav gradient without sig AS, normal LV sys fx   still with occasional episodes of  chest pressure   imdur started yesterday   dyspnea resolved today   ? from chronic lung disease  ?  from lvot gradient and elevated sbp  ? pulmonary edema although volume status improved  want to avoid overdiuresing in setting of pam and lvot gradient   chnage lasix 20 mg PO q 48 hrs   monitor creat levels   increased  BB     2.  Mild AS/  Moderate aortic regurgitation.   echo with lv intracav gradient without sig AS  continue with diuretics  continue with metoprolol  75mg BID      3. HTN, uncontrolled   slowly improving today   cont hydralazine 75mg TID   continue with norvasc  increase metropolol to 100 mg po BID    continue with imdur 30mg daily     4. PAM   renal f.u   creat level increased today in the setting of extra diuresis   continue to trend     5.   COPD   po steroids   repeat CT chest revealing decrease pleural effusions, bl groundglass opacities, 2 mm nodule in RUL   pulm f/u    6. Leukocytosis   on po steroids   continue to trend WBC   Ct chest results as mentioned above   RVP negative

## 2018-02-21 DIAGNOSIS — R19.7 DIARRHEA, UNSPECIFIED: ICD-10-CM

## 2018-02-21 LAB
BASOPHILS # BLD AUTO: 0.02 K/UL — SIGNIFICANT CHANGE UP (ref 0–0.2)
BASOPHILS NFR BLD AUTO: 0.1 % — SIGNIFICANT CHANGE UP (ref 0–2)
BLD GP AB SCN SERPL QL: NEGATIVE — SIGNIFICANT CHANGE UP
BUN SERPL-MCNC: 60 MG/DL — HIGH (ref 7–23)
CALCIUM SERPL-MCNC: 9.6 MG/DL — SIGNIFICANT CHANGE UP (ref 8.4–10.5)
CHLORIDE SERPL-SCNC: 103 MMOL/L — SIGNIFICANT CHANGE UP (ref 98–107)
CK MB BLD-MCNC: 1 NG/ML — SIGNIFICANT CHANGE UP (ref 1–4.7)
CK MB BLD-MCNC: SIGNIFICANT CHANGE UP (ref 0–2.5)
CK SERPL-CCNC: 7 U/L — LOW (ref 25–170)
CO2 SERPL-SCNC: 20 MMOL/L — LOW (ref 22–31)
CREAT SERPL-MCNC: 1.77 MG/DL — HIGH (ref 0.5–1.3)
EOSINOPHIL # BLD AUTO: 0.06 K/UL — SIGNIFICANT CHANGE UP (ref 0–0.5)
EOSINOPHIL NFR BLD AUTO: 0.3 % — SIGNIFICANT CHANGE UP (ref 0–6)
FERRITIN SERPL-MCNC: 225.4 NG/ML — HIGH (ref 15–150)
FOLATE SERPL-MCNC: > 20 NG/ML — HIGH (ref 4.7–20)
GLUCOSE SERPL-MCNC: 154 MG/DL — HIGH (ref 70–99)
HAPTOGLOB SERPL-MCNC: 365 MG/DL — HIGH (ref 34–200)
HCT VFR BLD CALC: 23.1 % — LOW (ref 34.5–45)
HCT VFR BLD CALC: 24.1 % — LOW (ref 34.5–45)
HGB BLD-MCNC: 7.7 G/DL — LOW (ref 11.5–15.5)
HGB BLD-MCNC: 8.2 G/DL — LOW (ref 11.5–15.5)
IMM GRANULOCYTES # BLD AUTO: 0.48 # — SIGNIFICANT CHANGE UP
IMM GRANULOCYTES NFR BLD AUTO: 2.2 % — HIGH (ref 0–1.5)
IRON SATN MFR SERPL: 12 UG/DL — LOW (ref 30–160)
IRON SATN MFR SERPL: 197 UG/DL — SIGNIFICANT CHANGE UP (ref 140–530)
LYMPHOCYTES # BLD AUTO: 1.74 K/UL — SIGNIFICANT CHANGE UP (ref 1–3.3)
LYMPHOCYTES # BLD AUTO: 7.8 % — LOW (ref 13–44)
MAGNESIUM SERPL-MCNC: 1.6 MG/DL — SIGNIFICANT CHANGE UP (ref 1.6–2.6)
MCHC RBC-ENTMCNC: 32 PG — SIGNIFICANT CHANGE UP (ref 27–34)
MCHC RBC-ENTMCNC: 32.7 PG — SIGNIFICANT CHANGE UP (ref 27–34)
MCHC RBC-ENTMCNC: 33.3 % — SIGNIFICANT CHANGE UP (ref 32–36)
MCHC RBC-ENTMCNC: 34 % — SIGNIFICANT CHANGE UP (ref 32–36)
MCV RBC AUTO: 95.9 FL — SIGNIFICANT CHANGE UP (ref 80–100)
MCV RBC AUTO: 96 FL — SIGNIFICANT CHANGE UP (ref 80–100)
MONOCYTES # BLD AUTO: 1.89 K/UL — HIGH (ref 0–0.9)
MONOCYTES NFR BLD AUTO: 8.5 % — SIGNIFICANT CHANGE UP (ref 2–14)
NEUTROPHILS # BLD AUTO: 18.03 K/UL — HIGH (ref 1.8–7.4)
NEUTROPHILS NFR BLD AUTO: 81.1 % — HIGH (ref 43–77)
NRBC # FLD: 0 — SIGNIFICANT CHANGE UP
NRBC # FLD: 0.03 — SIGNIFICANT CHANGE UP
PHOSPHATE SERPL-MCNC: 3.1 MG/DL — SIGNIFICANT CHANGE UP (ref 2.5–4.5)
PLATELET # BLD AUTO: 381 K/UL — SIGNIFICANT CHANGE UP (ref 150–400)
PLATELET # BLD AUTO: 394 K/UL — SIGNIFICANT CHANGE UP (ref 150–400)
PMV BLD: 11.1 FL — SIGNIFICANT CHANGE UP (ref 7–13)
PMV BLD: 11.3 FL — SIGNIFICANT CHANGE UP (ref 7–13)
POTASSIUM SERPL-MCNC: 4.5 MMOL/L — SIGNIFICANT CHANGE UP (ref 3.5–5.3)
POTASSIUM SERPL-SCNC: 4.5 MMOL/L — SIGNIFICANT CHANGE UP (ref 3.5–5.3)
PROCALCITONIN SERPL-MCNC: 0.29 NG/ML — HIGH (ref 0–0.04)
RBC # BLD: 2.41 M/UL — LOW (ref 3.8–5.2)
RBC # BLD: 2.51 M/UL — LOW (ref 3.8–5.2)
RBC # FLD: 14 % — SIGNIFICANT CHANGE UP (ref 10.3–14.5)
RBC # FLD: 14.1 % — SIGNIFICANT CHANGE UP (ref 10.3–14.5)
RETICS #: 94 K/UL — SIGNIFICANT CHANGE UP (ref 25–125)
RETICS/RBC NFR: 3.7 % — HIGH (ref 0.5–2.5)
RH IG SCN BLD-IMP: POSITIVE — SIGNIFICANT CHANGE UP
SODIUM SERPL-SCNC: 140 MMOL/L — SIGNIFICANT CHANGE UP (ref 135–145)
TROPONIN T SERPL-MCNC: < 0.06 NG/ML — SIGNIFICANT CHANGE UP (ref 0–0.06)
UIBC SERPL-MCNC: 185 UG/DL — SIGNIFICANT CHANGE UP (ref 110–370)
VIT B12 SERPL-MCNC: 636 PG/ML — SIGNIFICANT CHANGE UP (ref 200–900)
WBC # BLD: 22.16 K/UL — HIGH (ref 3.8–10.5)
WBC # BLD: 22.22 K/UL — HIGH (ref 3.8–10.5)
WBC # FLD AUTO: 22.16 K/UL — HIGH (ref 3.8–10.5)
WBC # FLD AUTO: 22.22 K/UL — HIGH (ref 3.8–10.5)

## 2018-02-21 PROCEDURE — 99233 SBSQ HOSP IP/OBS HIGH 50: CPT

## 2018-02-21 PROCEDURE — 71045 X-RAY EXAM CHEST 1 VIEW: CPT | Mod: 26

## 2018-02-21 RX ORDER — CEFEPIME 1 G/1
1000 INJECTION, POWDER, FOR SOLUTION INTRAMUSCULAR; INTRAVENOUS EVERY 24 HOURS
Qty: 0 | Refills: 0 | Status: DISCONTINUED | OUTPATIENT
Start: 2018-02-21 | End: 2018-02-21

## 2018-02-21 RX ORDER — AZTREONAM 2 G
1000 VIAL (EA) INJECTION EVERY 12 HOURS
Qty: 0 | Refills: 0 | Status: DISCONTINUED | OUTPATIENT
Start: 2018-02-21 | End: 2018-02-25

## 2018-02-21 RX ORDER — VANCOMYCIN HCL 1 G
750 VIAL (EA) INTRAVENOUS EVERY 24 HOURS
Qty: 0 | Refills: 0 | Status: DISCONTINUED | OUTPATIENT
Start: 2018-02-21 | End: 2018-02-25

## 2018-02-21 RX ADMIN — Medication 75 MILLIGRAM(S): at 13:40

## 2018-02-21 RX ADMIN — Medication 100 MILLIGRAM(S): at 05:55

## 2018-02-21 RX ADMIN — HEPARIN SODIUM 5000 UNIT(S): 5000 INJECTION INTRAVENOUS; SUBCUTANEOUS at 05:55

## 2018-02-21 RX ADMIN — LORATADINE 10 MILLIGRAM(S): 10 TABLET ORAL at 13:40

## 2018-02-21 RX ADMIN — HEPARIN SODIUM 5000 UNIT(S): 5000 INJECTION INTRAVENOUS; SUBCUTANEOUS at 17:56

## 2018-02-21 RX ADMIN — Medication 75 MILLIGRAM(S): at 05:55

## 2018-02-21 RX ADMIN — Medication 1000 UNIT(S): at 13:40

## 2018-02-21 RX ADMIN — Medication 50 MILLIGRAM(S): at 17:56

## 2018-02-21 RX ADMIN — ATORVASTATIN CALCIUM 20 MILLIGRAM(S): 80 TABLET, FILM COATED ORAL at 21:51

## 2018-02-21 RX ADMIN — AMLODIPINE BESYLATE 10 MILLIGRAM(S): 2.5 TABLET ORAL at 05:56

## 2018-02-21 RX ADMIN — SERTRALINE 100 MILLIGRAM(S): 25 TABLET, FILM COATED ORAL at 13:40

## 2018-02-21 RX ADMIN — PANTOPRAZOLE SODIUM 40 MILLIGRAM(S): 20 TABLET, DELAYED RELEASE ORAL at 05:55

## 2018-02-21 RX ADMIN — Medication 1 TABLET(S): at 13:40

## 2018-02-21 RX ADMIN — Medication 100 MILLIGRAM(S): at 17:55

## 2018-02-21 RX ADMIN — Medication 150 MILLIGRAM(S): at 14:50

## 2018-02-21 RX ADMIN — Medication 325 MILLIGRAM(S): at 15:51

## 2018-02-21 RX ADMIN — BUDESONIDE AND FORMOTEROL FUMARATE DIHYDRATE 2 PUFF(S): 160; 4.5 AEROSOL RESPIRATORY (INHALATION) at 21:51

## 2018-02-21 RX ADMIN — Medication 3 MILLIGRAM(S): at 21:51

## 2018-02-21 RX ADMIN — Medication 75 MILLIGRAM(S): at 21:57

## 2018-02-21 RX ADMIN — ISOSORBIDE MONONITRATE 30 MILLIGRAM(S): 60 TABLET, EXTENDED RELEASE ORAL at 13:40

## 2018-02-21 RX ADMIN — BUDESONIDE AND FORMOTEROL FUMARATE DIHYDRATE 2 PUFF(S): 160; 4.5 AEROSOL RESPIRATORY (INHALATION) at 10:10

## 2018-02-21 NOTE — CHART NOTE - NSCHARTNOTEFT_GEN_A_CORE
Notified by RN pt converted to Afib rate controlled, unable to specify the exact time. Pt seen by me,  states she has not been feeling well today and states her breathing has been at its worse. Pt is on Home O2 prn at baseline.  On exam pt appears comfortable NAD. VSS, lungs with minimal wheezing.  Pt states she may had" irregular heart bit " in the past, denies feeling any palpitations at this time. Pt endorses that she wants to have her Aortic Valve further evaluate for possible surgery. EKG ordered, Pt is on Duonebs prn. cardio to f/up in am. Will c/t monitor.

## 2018-02-21 NOTE — CONSULT NOTE ADULT - SUBJECTIVE AND OBJECTIVE BOX
Patient is a 84y old  Female who presents with a chief complaint of urinary retention and shortness of breath (15 Feb 2018 12:03)      HPI:  83 y/o F with h/o COPD on 3L of O2 at home, HTN, CHF, DM, HLD, diverticulosis, breast ca s/p lumpectomy and RT, presents to the ED for urinary retention X 5 days and shortness of breath. Pt states she has been dribbling her urine for the past 5 days. Pt states she has been going to urinate more often. Pt also states she has shortness of breath with exertion. Pt states she had to decrease her lasix dose due to worsening renal function. Pt denies LOC, syncope,fever chillls, N/V/D/C, numbness, tingling, chest pain, palpitations, lightheadness, dizziness, dysuria, urinary/bowel incontinence or any other complaints at this time. (13 Feb 2018 21:54)      REVIEW OF SYSTEMS:    CONSTITUTIONAL: No fever, weight loss, or fatigue  EYES: No eye pain, visual disturbances, or discharge  ENMT:  No sore throat  NECK: No pain or stiffness  RESPIRATORY: No cough, wheezing, chills or hemoptysis; No shortness of breath  CARDIOVASCULAR: No chest pain, palpitations, dizziness, or leg swelling  GASTROINTESTINAL: No abdominal or epigastric pain. No nausea, vomiting, or hematemesis; No diarrhea or constipation. No melena or hematochezia.  GENITOURINARY: No dysuria, frequency, hematuria, or incontinence  NEUROLOGICAL: No headaches, memory loss, loss of strength, numbness, or tremors  SKIN: No itching, burning, rashes, or lesions   LYMPH NODES: No enlarged glands  MUSCULOSKELETAL: No joint pain or swelling; No muscle, back, or extremity pain      PAST MEDICAL & SURGICAL HISTORY:  Congestive heart failure (CHF)  Pulmonary hypertension  Emphysema of lung  Femoral Artery Thrombosis: partiac oclusion with no inteventions as per Pt on ASA Plavix  History of Colonoscopy with Polypectomy  History of Hysterectomy  Diverticulosis of Colon  Breast Cancer: S/P Lt lumpectomy &amp; RT  Dyslipidemia  DM (Diabetes Mellitus)  HTN (Hypertension)  COPD (Chronic Obstructive Pulmonary Disease)  History of tonsillectomy  S/P lumpectomy, left breast: 1998  H/O total hysterectomy: 1999      Allergies    lisinopril (Anaphylaxis)  penicillin (Anaphylaxis)    Intolerances        FAMILY HISTORY:  No pertinent family history in first degree relatives      SOCIAL HISTORY:        MEDICATIONS  (STANDING):  amLODIPine   Tablet 10 milliGRAM(s) Oral daily  atorvastatin 20 milliGRAM(s) Oral at bedtime  buDESOnide 160 MICROgram(s)/formoterol 4.5 MICROgram(s) Inhaler 2 Puff(s) Inhalation two times a day  cefepime  IVPB 1000 milliGRAM(s) IV Intermittent every 24 hours  cholecalciferol 1000 Unit(s) Oral daily  cinacalcet 30 milliGRAM(s) Oral <User Schedule>  ferrous    sulfate 325 milliGRAM(s) Oral daily  heparin  Injectable 5000 Unit(s) SubCutaneous every 12 hours  hydrALAZINE 75 milliGRAM(s) Oral three times a day  isosorbide   mononitrate ER Tablet (IMDUR) 30 milliGRAM(s) Oral daily  loratadine 10 milliGRAM(s) Oral daily  melatonin 3 milliGRAM(s) Oral at bedtime  metoprolol     tartrate 100 milliGRAM(s) Oral two times a day  multivitamin 1 Tablet(s) Oral daily  pantoprazole    Tablet 40 milliGRAM(s) Oral before breakfast  sertraline 100 milliGRAM(s) Oral daily  vancomycin  IVPB 750 milliGRAM(s) IV Intermittent every 24 hours    MEDICATIONS  (PRN):  acetaminophen   Tablet. 650 milliGRAM(s) Oral every 6 hours PRN mild or Moderate Pain (4 - 6)  ALBUTerol    90 MICROgram(s) HFA Inhaler 2 Puff(s) Inhalation every 6 hours PRN Bronchospasm  aluminum hydroxide/magnesium hydroxide/simethicone Suspension 30 milliLiter(s) Oral every 6 hours PRN Dyspepsia  benzocaine 15 mG/menthol 3.6 mG Lozenge 1 Lozenge Oral every 4 hours PRN Sore Throat      Vital Signs Last 24 Hrs  T(C): 36.6 (21 Feb 2018 05:48), Max: 37.6 (20 Feb 2018 22:11)  T(F): 97.9 (21 Feb 2018 05:48), Max: 99.6 (20 Feb 2018 22:11)  HR: 63 (21 Feb 2018 05:48) (56 - 68)  BP: 153/50 (21 Feb 2018 05:48) (153/50 - 185/49)  BP(mean): 63 (21 Feb 2018 05:48) (63 - 80)  RR: 18 (21 Feb 2018 05:48) (18 - 18)  SpO2: 98% (21 Feb 2018 05:48) (96% - 98%)    PHYSICAL EXAM:    GENERAL: NAD, well-groomed  HEAD:  Atraumatic, Normocephalic  EYES: EOMI, PERRLA, conjunctiva and sclera clear  ENMT: No tonsillar erythema, exudates, or enlargement; Moist mucous membranes  NECK: Supple, No JVD  CHEST/LUNG: Clear to percussion bilaterally; No rales, rhonchi, wheezing, or rubs  HEART: Regular rate and rhythm; No murmurs, rubs, or gallops  ABDOMEN: Soft, Nontender, Nondistended; Bowel sounds present  EXTREMITIES:  2+ Peripheral Pulses, No clubbing, cyanosis, or edema  LYMPH: No lymphadenopathy noted  SKIN: No rashes or lesions    LABS:  CBC Full  -  ( 21 Feb 2018 10:30 )  WBC Count : 22.16 K/uL  Hemoglobin : 8.2 g/dL  Hematocrit : 24.1 %  Platelet Count - Automated : 394 K/uL  Mean Cell Volume : 96.0 fL  Mean Cell Hemoglobin : 32.7 pg  Mean Cell Hemoglobin Concentration : 34.0 %  Auto Neutrophil # : x  Auto Lymphocyte # : x  Auto Monocyte # : x  Auto Eosinophil # : x  Auto Basophil # : x  Auto Neutrophil % : x  Auto Lymphocyte % : x  Auto Monocyte % : x  Auto Eosinophil % : x  Auto Basophil % : x      02-21    140  |  103  |  60<H>  ----------------------------<  154<H>  4.5   |  20<L>  |  1.77<H>    Ca    9.6      21 Feb 2018 06:55  Phos  3.1     02-21  Mg     1.6     02-21            MICROBIOLOGY:    Culture - Urine (02.13.18 @ 16:21)    Culture - Urine:   NO GROWTH AT 24 HOURS    Specimen Source: URINE CATHETER          RADIOLOGY:    < from: Xray Chest 1 View- PORTABLE-Urgent (02.21.18 @ 10:27) >  FINDINGS:  Cardiac size cannot be determined on this AP projection.    Patchy opacity in the left upper lung field. No pneumothorax or pleural   effusions.    Degenerative changes spine.    IMPRESSION: Patchy opacity in the left upper lung field, suspicious for   infection.    < end of copied text >        < from: Xray Chest 1 View- PORTABLE-Routine (02.20.18 @ 08:20) >  INTERPRETATION:     Heart size and the mediastinum cannot be accurately evaluated on this   projection.  There is accentuation and indistinctness of the pulmonary vascular   markings, greater on the left. There appears to be some airspace opacity   on the left as well. Comparison with the prior CT scan is limited due to   different technique. The left-sided findings may be slightly increased.  There is no right pleural effusion. A small left pleural effusion is seen.  No pneumothorax is noted.              IMPRESSION:  Accentuation and indistinctness of pulmonary vascular   markings, greater on the left, as well as some left airspace opacity,   findings which may be attributable to asymmetric pulmonary edema. Limited   comparison with prior CT scan due to differing technique however   left-sided findings may be slightly increased. Superimposed infection,   particularly on the left, is not excluded.    Small left pleural effusion.    < end of copied text >        < from: CT Chest No Cont (02.17.18 @ 09:45) >  CHEST:     LUNGS AND LARGE AIRWAYS: The bilateral groundglass opacities and septal   thickening have slightly decreased since 2/14/2018. The 2 mm nodule in   the right upper lobe was better shown on the prior study. The calcified   left lower lobe nodule is unchanged. The groundglass opacities are   increased in density on the expiratory phase of imaging. There is no   convincing area of air trapping. The right middle lobe and right lower   lobe subpleural cysts are unchanged.    PLEURA: The bilateral trace pleural effusions have decreased in size. No   pneumothorax.    VESSELS: Atherosclerotic calcifications of the aorta and coronary   arteries.    HEART: The heart is enlarged. In particular, the left atrium is enlarged.   Coronary artery calcifications involving left anterior descending,   circumflex, and right coronary arteries. Aortic valve calcifications.   Mitralannular calcifications. Possible calcifications of the mitral   valve. No pericardial effusion.    MEDIASTINUM AND GINI: Small nonspecific solid hilar lymph nodes, some   which are calcified.    CHEST WALL AND LOWER NECK: Status post left-sided mastectomy.    VISUALIZED UPPER ABDOMEN: Scattered calcifications within the liver,   spleen, and adrenal glands. Incompletely visualized left renal cyst.   Cholelithiasis.    BONES: Degenerative changes.    IMPRESSION:     1.  The findings are most compatible with resolving pulmonary edema   rather than infection or interstitial lung disease. Follow-up is   recommended to confirm resolution in 4 weeks.    < end of copied text >      < from: CT Chest No Cont (02.14.18 @ 17:51) >  IMPRESSION:     1.  Emphysema. Diffuse bilateral groundglass opacities, which are   persistent but improved relative to 2014 chest CT. The differential for   this finding is broad. In the acute setting this may be from infection or   edema. However drug reaction, hypersensitivity pneumonitis, and pulmonary   siderosis can also have this appearance.    2.  Small right pleural effusion with passive atelectasis.    < end of copied text > Patient is a 84y old  Female who presents with a chief complaint of urinary retention and shortness of breath (15 Feb 2018 12:03)      HPI:  85 y/o F with h/o COPD on 3L of O2 at home, HTN, CHF, DM, HLD, diverticulosis, breast ca s/p lumpectomy and RT, presents to the ED for urinary retention X 5 days and shortness of breath. Pt states she has been dribbling her urine for the past 5 days. Pt states she has been going to urinate more often. Pt also states she has shortness of breath with exertion. Pt states she had to decrease her lasix dose due to worsening renal function. Pt denies LOC, syncope,fever chillls, N/V/D/C, numbness, tingling, chest pain, palpitations, lightheadness, dizziness, dysuria, urinary/bowel incontinence or any other complaints at this time. (13 Feb 2018 21:54)    Pt has been afebrile thus far.   She is being managed for acute on chronic CHF: s/p IV lasix, now on po lasix.   Pt followed by Pulm for COPD exacerbation and on PO steroids. Initial CT chest showed emphysema, and small right pleural effusion, Repeat CT Chest -resolving pulm edema.  Pt with new onset afib on tele rate controlled.  Pt noted to have rising WBC.  She currently denies cough, but states she has "fluttering sensation in left side of chest".  Denies fever/chills.    Repeat chest xray  CXR 2/21 with new BABS infiltrate.  ID consult called for further abx management for HCAP.          REVIEW OF SYSTEMS:    CONSTITUTIONAL: No fever, weight loss, or fatigue  EYES: No eye pain, visual disturbances, or discharge  ENMT:  No sore throat  NECK: No pain or stiffness  RESPIRATORY: No cough, wheezing, chills or hemoptysis; No shortness of breath  CARDIOVASCULAR: No chest pain, palpitations, dizziness, or leg swelling  GASTROINTESTINAL: No abdominal or epigastric pain. No nausea, vomiting, or hematemesis; No diarrhea or constipation. No melena or hematochezia.  GENITOURINARY: No dysuria, frequency, hematuria, or incontinence  NEUROLOGICAL: No headaches, memory loss, loss of strength, numbness, or tremors  SKIN: No itching, burning, rashes, or lesions   LYMPH NODES: No enlarged glands  MUSCULOSKELETAL: No joint pain or swelling; No muscle, back, or extremity pain      PAST MEDICAL & SURGICAL HISTORY:  Congestive heart failure (CHF)  Pulmonary hypertension  Emphysema of lung  Femoral Artery Thrombosis: partiac oclusion with no inteventions as per Pt on ASA Plavix  History of Colonoscopy with Polypectomy  History of Hysterectomy  Diverticulosis of Colon  Breast Cancer: S/P Lt lumpectomy &amp; RT  Dyslipidemia  DM (Diabetes Mellitus)  HTN (Hypertension)  COPD (Chronic Obstructive Pulmonary Disease)  History of tonsillectomy  S/P lumpectomy, left breast: 1998  H/O total hysterectomy: 1999      Allergies    lisinopril (Anaphylaxis)  penicillin (Anaphylaxis)    Intolerances        FAMILY HISTORY:  No pertinent family history in first degree relatives      SOCIAL HISTORY:  Prior smoker.  No etoh, ivdu    MEDICATIONS  (STANDING):  amLODIPine   Tablet 10 milliGRAM(s) Oral daily  atorvastatin 20 milliGRAM(s) Oral at bedtime  buDESOnide 160 MICROgram(s)/formoterol 4.5 MICROgram(s) Inhaler 2 Puff(s) Inhalation two times a day  cefepime  IVPB 1000 milliGRAM(s) IV Intermittent every 24 hours  cholecalciferol 1000 Unit(s) Oral daily  cinacalcet 30 milliGRAM(s) Oral <User Schedule>  ferrous    sulfate 325 milliGRAM(s) Oral daily  heparin  Injectable 5000 Unit(s) SubCutaneous every 12 hours  hydrALAZINE 75 milliGRAM(s) Oral three times a day  isosorbide   mononitrate ER Tablet (IMDUR) 30 milliGRAM(s) Oral daily  loratadine 10 milliGRAM(s) Oral daily  melatonin 3 milliGRAM(s) Oral at bedtime  metoprolol     tartrate 100 milliGRAM(s) Oral two times a day  multivitamin 1 Tablet(s) Oral daily  pantoprazole    Tablet 40 milliGRAM(s) Oral before breakfast  sertraline 100 milliGRAM(s) Oral daily  vancomycin  IVPB 750 milliGRAM(s) IV Intermittent every 24 hours    MEDICATIONS  (PRN):  acetaminophen   Tablet. 650 milliGRAM(s) Oral every 6 hours PRN mild or Moderate Pain (4 - 6)  ALBUTerol    90 MICROgram(s) HFA Inhaler 2 Puff(s) Inhalation every 6 hours PRN Bronchospasm  aluminum hydroxide/magnesium hydroxide/simethicone Suspension 30 milliLiter(s) Oral every 6 hours PRN Dyspepsia  benzocaine 15 mG/menthol 3.6 mG Lozenge 1 Lozenge Oral every 4 hours PRN Sore Throat      Vital Signs Last 24 Hrs  T(C): 36.6 (21 Feb 2018 05:48), Max: 37.6 (20 Feb 2018 22:11)  T(F): 97.9 (21 Feb 2018 05:48), Max: 99.6 (20 Feb 2018 22:11)  HR: 63 (21 Feb 2018 05:48) (56 - 68)  BP: 153/50 (21 Feb 2018 05:48) (153/50 - 185/49)  BP(mean): 63 (21 Feb 2018 05:48) (63 - 80)  RR: 18 (21 Feb 2018 05:48) (18 - 18)  SpO2: 98% (21 Feb 2018 05:48) (96% - 98%)    PHYSICAL EXAM:    GENERAL: NAD, well-groomed  HEAD:  Atraumatic, Normocephalic  EYES: EOMI, PERRLA, conjunctiva and sclera clear  ENMT: No tonsillar erythema, exudates, or enlargement; Moist mucous membranes  NECK: Supple, No JVD  CHEST/LUNG: Clear to percussion bilaterally; No rales, rhonchi, wheezing, or rubs  HEART: Regular rate and rhythm; No murmurs, rubs, or gallops  ABDOMEN: Soft, Nontender, Nondistended; Bowel sounds present  EXTREMITIES:  2+ Peripheral Pulses, No clubbing, cyanosis, or edema  LYMPH: No lymphadenopathy noted  SKIN: No rashes or lesions    LABS:  CBC Full  -  ( 21 Feb 2018 10:30 )  WBC Count : 22.16 K/uL  Hemoglobin : 8.2 g/dL  Hematocrit : 24.1 %  Platelet Count - Automated : 394 K/uL  Mean Cell Volume : 96.0 fL  Mean Cell Hemoglobin : 32.7 pg  Mean Cell Hemoglobin Concentration : 34.0 %  Auto Neutrophil # : x  Auto Lymphocyte # : x  Auto Monocyte # : x  Auto Eosinophil # : x  Auto Basophil # : x  Auto Neutrophil % : x  Auto Lymphocyte % : x  Auto Monocyte % : x  Auto Eosinophil % : x  Auto Basophil % : x      02-21    140  |  103  |  60<H>  ----------------------------<  154<H>  4.5   |  20<L>  |  1.77<H>    Ca    9.6      21 Feb 2018 06:55  Phos  3.1     02-21  Mg     1.6     02-21            MICROBIOLOGY:    Urinalysis (02.13.18 @ 16:04)    Color: PLYEL    Urine Appearance: CLEAR    Glucose: NEGATIVE: Glucose is reported in mg/dL.  Negative is defined as < 0.03 mg/dL.    Bilirubin: NEGATIVE    Ketone - Urine: NEGATIVE    Specific Gravity: 1.013    Blood: NEGATIVE    pH - Urine: 6.5    Protein, Urine: 300 mg/dL    Urobilinogen: NORMAL mg/dL    Nitrite: NEGATIVE    Leukocyte Esterase Concentration: NEGATIVE    Red Blood Cell - Urine: 0-2    White Blood Cell - Urine: 2-5    Mucus: FEW    Bacteria: FEW    Squamous Epithelial: OCC        Culture - Urine (02.13.18 @ 16:21)    Culture - Urine:   NO GROWTH AT 24 HOURS    Specimen Source: URINE CATHETER    Rapid Respiratory Viral Panel (02.18.18 @ 15:51)    Adenovirus (RapRVP): NOT DETECTED    Influenza A (RapRVP): NOT DETECTED (any subtype)    Influenza AH1 2009 (RapRVP): NOT DETECTED    Influenza AH1 (RapRVP): NOT DETECTED    Influenza AH3 (RapRVP): NOT DETECTED    Influenza B (RapRVP): NOT DETECTED    Parainfluenza 1 (RapRVP): NOT DETECTED    Parainfluenza 2 (RapRVP): NOT DETECTED    Parainfluenza 3 (RapRVP): NOT DETECTED    Parainfluenza 4 (RapRVP): NOT DETECTED    Resp Syncytial Virus (RapRVP): NOT DETECTED    Bordetella pertussis (RapRVP): NOT DETECTED    Chlamydia pneumoniae (RapRVP): NOT DETECTED    Mycoplasma pneumoniae (RapRVP): NOT DETECTED This nucleic acid amplification assay was performed using  the [employer]. The following pathogens are tested  for: Adenovirus, Coronavirus 229E, Coronavirus HKU1,  Coronavirus NL63, Coronavirus OC43, Human Metapneumovirus  (HMPV), Rhinovirus/Enterovirus, Influenza A H1, Influenza A  H1 2009 (Pandemic H1 2009), Influenza A H3, Influenza A (Flu  A) subtype not identified, Influenza B, Parainfluenza Virus  (types 1, 2, 3, 4), Respiratory Syncytial Virus (RSV),  Bordetella pertussis, Chlamydophila pneumoniae, and  Mycoplasma pneumoniae. A negative FilmArray result does not  always exclude the possibility of viral or bacterial  infection. Laboratory results should always be interpreted  in the context of clinical findings.    Entero/Rhinovirus (RapRVP): NOT DETECTED    HKU1 Coronavirus (RapRVP): NOT DETECTED    NL63 Coronavirus (RapRVP): NOT DETECTED    229E Coronavirus (RapRVP): NOT DETECTED    OC43 Coronavirus (RapRVP): NOT DETECTED    hMPV (RapRVP): NOT DETECTED          RADIOLOGY:    < from: Xray Chest 1 View- PORTABLE-Urgent (02.21.18 @ 10:27) >  FINDINGS:  Cardiac size cannot be determined on this AP projection.    Patchy opacity in the left upper lung field. No pneumothorax or pleural   effusions.    Degenerative changes spine.    IMPRESSION: Patchy opacity in the left upper lung field, suspicious for   infection.    < end of copied text >        < from: Xray Chest 1 View- PORTABLE-Routine (02.20.18 @ 08:20) >  INTERPRETATION:     Heart size and the mediastinum cannot be accurately evaluated on this   projection.  There is accentuation and indistinctness of the pulmonary vascular   markings, greater on the left. There appears to be some airspace opacity   on the left as well. Comparison with the prior CT scan is limited due to   different technique. The left-sided findings may be slightly increased.  There is no right pleural effusion. A small left pleural effusion is seen.  No pneumothorax is noted.              IMPRESSION:  Accentuation and indistinctness of pulmonary vascular   markings, greater on the left, as well as some left airspace opacity,   findings which may be attributable to asymmetric pulmonary edema. Limited   comparison with prior CT scan due to differing technique however   left-sided findings may be slightly increased. Superimposed infection,   particularly on the left, is not excluded.    Small left pleural effusion.    < end of copied text >        < from: CT Chest No Cont (02.17.18 @ 09:45) >  CHEST:     LUNGS AND LARGE AIRWAYS: The bilateral groundglass opacities and septal   thickening have slightly decreased since 2/14/2018. The 2 mm nodule in   the right upper lobe was better shown on the prior study. The calcified   left lower lobe nodule is unchanged. The groundglass opacities are   increased in density on the expiratory phase of imaging. There is no   convincing area of air trapping. The right middle lobe and right lower   lobe subpleural cysts are unchanged.    PLEURA: The bilateral trace pleural effusions have decreased in size. No   pneumothorax.    VESSELS: Atherosclerotic calcifications of the aorta and coronary   arteries.    HEART: The heart is enlarged. In particular, the left atrium is enlarged.   Coronary artery calcifications involving left anterior descending,   circumflex, and right coronary arteries. Aortic valve calcifications.   Mitralannular calcifications. Possible calcifications of the mitral   valve. No pericardial effusion.    MEDIASTINUM AND GINI: Small nonspecific solid hilar lymph nodes, some   which are calcified.    CHEST WALL AND LOWER NECK: Status post left-sided mastectomy.    VISUALIZED UPPER ABDOMEN: Scattered calcifications within the liver,   spleen, and adrenal glands. Incompletely visualized left renal cyst.   Cholelithiasis.    BONES: Degenerative changes.    IMPRESSION:     1.  The findings are most compatible with resolving pulmonary edema   rather than infection or interstitial lung disease. Follow-up is   recommended to confirm resolution in 4 weeks.    < end of copied text >      < from: CT Chest No Cont (02.14.18 @ 17:51) >  IMPRESSION:     1.  Emphysema. Diffuse bilateral groundglass opacities, which are   persistent but improved relative to 2014 chest CT. The differential for   this finding is broad. In the acute setting this may be from infection or   edema. However drug reaction, hypersensitivity pneumonitis, and pulmonary   siderosis can also have this appearance.    2.  Small right pleural effusion with passive atelectasis.    < end of copied text >

## 2018-02-21 NOTE — PROGRESS NOTE ADULT - ATTENDING COMMENTS
Patient seen and examined.  Agree with above NP note.  continued dyspnea  volume status appears ok  wbc invreased cxr ? pna  iv abx  id/pulmo f/u  off steorids  avoid further aggressive diruesis  trend heme, wbc  chest pain atypical  no acs

## 2018-02-21 NOTE — PROGRESS NOTE ADULT - ASSESSMENT
85 y/o F with h/o COPD on 3L of O2 at home, HTN, DCHF, DM, HLD, diverticulosis, breast ca s/p lumpectomy and RT, presents  with  urinary retention  and shortness of breath    1. acute on chronic DCHF  appears near euvolemia  echo with lv intracav gradient without sig AS, normal LV sys fx   still with c.o of  chest pressure   ? from chronic lung disease  ?  from lvot gradient and elevated sbp  ? pulmonary edema although volume status improved  check repeat EKG   check Cardiac enzymes   check repeat chest xray   increase imdur to 60 mg daily   want to avoid overdiuresing in setting of pam and lvot gradient   on lasix 20 mg PO q 48 hrs   monitor creat levels   continue with BB     2.  Mild AS/  Moderate aortic regurgitation.   echo with lv intracav gradient without sig AS  continue with diuretics  continue with metoprolol      3. HTN, uncontrolled   slowly improving   cont hydralazine, norvasc, metoprolol   increase  imdur 60 mg daily     4. PAM   renal f.u   continue to trend     5.   COPD   s/p po steroids   repeat CT chest revealing decrease pleural effusions, bl groundglass opacities, 2 mm nodule in RUL   chest xray revealing pulm edema L >R   pulm f/u    6. Leukocytosis   s/p po steroids   WBC remains elevated   check repeat chest xray   rvp negative     7. Afib   New onset  rate controlled   ekg revealing no evidence of ACS   CHADS 4 , stopping asa and plavix due to drop in h/h     8. Anemia   on iron supplement  repeat cbc with type and cross  check occult stool   stop asa/ plavix

## 2018-02-21 NOTE — PROGRESS NOTE ADULT - ATTENDING COMMENTS
[facility]  Elliott Beltran M.D.  Marco Antonio Fernando D.O.  Tracie Mcfadden M.D.  Britney Marie, MSN, ANP-C    Telephone: [phone]  Facsimile: [phone]    [address] [place] [address]

## 2018-02-21 NOTE — PROGRESS NOTE ADULT - PROBLEM SELECTOR PLAN 1
Patient with PAM likely hemodynamically mediated due to diuretic therapy in setting of ARB use. Scr relatively stable. Continue to hold losartan and can restart once renal function improves. Avoid nephrotoxins.

## 2018-02-21 NOTE — PROVIDER CONTACT NOTE (OTHER) - ASSESSMENT
Per pt, worsening of REYES when ambulates. Chest pressure at times but denies pain. No acute distress noted. Hypertensive, BP meds given as ordered.

## 2018-02-21 NOTE — PROGRESS NOTE ADULT - ASSESSMENT
83 y/o F with h/o COPD, CHF, HTN, HLD, DM, breast Ca s/p left lumpectomy and RT, diverticulosis presents to the ED for shortness of breath:  1. SOB - Acute on chronic diast CHF, now appears euvolemic, transient episodes of SOB, now with RUL PNA, c/w PO Lasix per Card  2.  HLD - c/w statin  3. HTN - BP elevated, plan per Cardio  4. COPD - Pulm appreciated, will need repeat CT chest in 4weeks for nodule f/u, c/w inhalers per Pulm, completed steroids  5. DVT prophylaxis  6. Anemia - Hb stable  7. PAM - Cr up today, likely 2/2 extra Lasix yesterday, Nephro f/u  8. Palpitations - NSVT, EPS apreciated, c/w BB  9. Leukocytosis - likely 2/2 PNA  10. RUL PNA - ID eval, blood cx, sputum cx, Aztreonam + Zithromax for now

## 2018-02-22 LAB
BASOPHILS # BLD AUTO: 0.06 K/UL — SIGNIFICANT CHANGE UP (ref 0–0.2)
BASOPHILS NFR BLD AUTO: 0.2 % — SIGNIFICANT CHANGE UP (ref 0–2)
BLD GP AB SCN SERPL QL: NEGATIVE — SIGNIFICANT CHANGE UP
BUN SERPL-MCNC: 59 MG/DL — HIGH (ref 7–23)
CALCIUM SERPL-MCNC: 9.7 MG/DL — SIGNIFICANT CHANGE UP (ref 8.4–10.5)
CHLORIDE SERPL-SCNC: 102 MMOL/L — SIGNIFICANT CHANGE UP (ref 98–107)
CO2 SERPL-SCNC: 18 MMOL/L — LOW (ref 22–31)
CREAT SERPL-MCNC: 1.71 MG/DL — HIGH (ref 0.5–1.3)
EOSINOPHIL # BLD AUTO: 0.07 K/UL — SIGNIFICANT CHANGE UP (ref 0–0.5)
EOSINOPHIL NFR BLD AUTO: 0.3 % — SIGNIFICANT CHANGE UP (ref 0–6)
GLUCOSE SERPL-MCNC: 129 MG/DL — HIGH (ref 70–99)
HCT VFR BLD CALC: 23.9 % — LOW (ref 34.5–45)
HGB BLD-MCNC: 7.7 G/DL — LOW (ref 11.5–15.5)
IMM GRANULOCYTES # BLD AUTO: 0.76 # — SIGNIFICANT CHANGE UP
IMM GRANULOCYTES NFR BLD AUTO: 2.9 % — HIGH (ref 0–1.5)
LYMPHOCYTES # BLD AUTO: 10.1 % — LOW (ref 13–44)
LYMPHOCYTES # BLD AUTO: 2.65 K/UL — SIGNIFICANT CHANGE UP (ref 1–3.3)
MAGNESIUM SERPL-MCNC: 1.6 MG/DL — SIGNIFICANT CHANGE UP (ref 1.6–2.6)
MCHC RBC-ENTMCNC: 31.2 PG — SIGNIFICANT CHANGE UP (ref 27–34)
MCHC RBC-ENTMCNC: 32.2 % — SIGNIFICANT CHANGE UP (ref 32–36)
MCV RBC AUTO: 96.8 FL — SIGNIFICANT CHANGE UP (ref 80–100)
MONOCYTES # BLD AUTO: 2.17 K/UL — HIGH (ref 0–0.9)
MONOCYTES NFR BLD AUTO: 8.2 % — SIGNIFICANT CHANGE UP (ref 2–14)
NEUTROPHILS # BLD AUTO: 20.62 K/UL — HIGH (ref 1.8–7.4)
NEUTROPHILS NFR BLD AUTO: 78.3 % — HIGH (ref 43–77)
NRBC # FLD: 0.02 — SIGNIFICANT CHANGE UP
PHOSPHATE SERPL-MCNC: 3.1 MG/DL — SIGNIFICANT CHANGE UP (ref 2.5–4.5)
PLATELET # BLD AUTO: 424 K/UL — HIGH (ref 150–400)
PMV BLD: 11.1 FL — SIGNIFICANT CHANGE UP (ref 7–13)
POTASSIUM SERPL-MCNC: 4.7 MMOL/L — SIGNIFICANT CHANGE UP (ref 3.5–5.3)
POTASSIUM SERPL-SCNC: 4.7 MMOL/L — SIGNIFICANT CHANGE UP (ref 3.5–5.3)
RBC # BLD: 2.47 M/UL — LOW (ref 3.8–5.2)
RBC # FLD: 14.1 % — SIGNIFICANT CHANGE UP (ref 10.3–14.5)
RH IG SCN BLD-IMP: POSITIVE — SIGNIFICANT CHANGE UP
SODIUM SERPL-SCNC: 140 MMOL/L — SIGNIFICANT CHANGE UP (ref 135–145)
WBC # BLD: 26.33 K/UL — HIGH (ref 3.8–10.5)
WBC # FLD AUTO: 26.33 K/UL — HIGH (ref 3.8–10.5)

## 2018-02-22 PROCEDURE — 99233 SBSQ HOSP IP/OBS HIGH 50: CPT

## 2018-02-22 RX ORDER — ACETAMINOPHEN 500 MG
650 TABLET ORAL EVERY 6 HOURS
Qty: 0 | Refills: 0 | Status: DISCONTINUED | OUTPATIENT
Start: 2018-02-22 | End: 2018-02-25

## 2018-02-22 RX ORDER — ISOSORBIDE MONONITRATE 60 MG/1
60 TABLET, EXTENDED RELEASE ORAL DAILY
Qty: 0 | Refills: 0 | Status: DISCONTINUED | OUTPATIENT
Start: 2018-02-23 | End: 2018-02-25

## 2018-02-22 RX ORDER — ASPIRIN/CALCIUM CARB/MAGNESIUM 324 MG
325 TABLET ORAL DAILY
Qty: 0 | Refills: 0 | Status: DISCONTINUED | OUTPATIENT
Start: 2018-02-22 | End: 2018-02-25

## 2018-02-22 RX ADMIN — AMLODIPINE BESYLATE 10 MILLIGRAM(S): 2.5 TABLET ORAL at 06:43

## 2018-02-22 RX ADMIN — LORATADINE 10 MILLIGRAM(S): 10 TABLET ORAL at 13:23

## 2018-02-22 RX ADMIN — Medication 75 MILLIGRAM(S): at 06:43

## 2018-02-22 RX ADMIN — Medication 75 MILLIGRAM(S): at 22:51

## 2018-02-22 RX ADMIN — Medication 3 MILLIGRAM(S): at 22:51

## 2018-02-22 RX ADMIN — HEPARIN SODIUM 5000 UNIT(S): 5000 INJECTION INTRAVENOUS; SUBCUTANEOUS at 17:34

## 2018-02-22 RX ADMIN — BUDESONIDE AND FORMOTEROL FUMARATE DIHYDRATE 2 PUFF(S): 160; 4.5 AEROSOL RESPIRATORY (INHALATION) at 22:53

## 2018-02-22 RX ADMIN — ATORVASTATIN CALCIUM 20 MILLIGRAM(S): 80 TABLET, FILM COATED ORAL at 22:51

## 2018-02-22 RX ADMIN — Medication 1000 UNIT(S): at 13:23

## 2018-02-22 RX ADMIN — Medication 325 MILLIGRAM(S): at 13:23

## 2018-02-22 RX ADMIN — Medication 20 MILLIGRAM(S): at 07:53

## 2018-02-22 RX ADMIN — Medication 100 MILLIGRAM(S): at 17:34

## 2018-02-22 RX ADMIN — PANTOPRAZOLE SODIUM 40 MILLIGRAM(S): 20 TABLET, DELAYED RELEASE ORAL at 06:43

## 2018-02-22 RX ADMIN — Medication 50 MILLIGRAM(S): at 06:38

## 2018-02-22 RX ADMIN — SERTRALINE 100 MILLIGRAM(S): 25 TABLET, FILM COATED ORAL at 13:23

## 2018-02-22 RX ADMIN — Medication 100 MILLIGRAM(S): at 06:43

## 2018-02-22 RX ADMIN — Medication 75 MILLIGRAM(S): at 13:23

## 2018-02-22 RX ADMIN — HEPARIN SODIUM 5000 UNIT(S): 5000 INJECTION INTRAVENOUS; SUBCUTANEOUS at 06:44

## 2018-02-22 RX ADMIN — CINACALCET 30 MILLIGRAM(S): 30 TABLET, FILM COATED ORAL at 10:10

## 2018-02-22 RX ADMIN — BUDESONIDE AND FORMOTEROL FUMARATE DIHYDRATE 2 PUFF(S): 160; 4.5 AEROSOL RESPIRATORY (INHALATION) at 10:10

## 2018-02-22 RX ADMIN — Medication 1 TABLET(S): at 13:23

## 2018-02-22 RX ADMIN — Medication 50 MILLIGRAM(S): at 17:34

## 2018-02-22 RX ADMIN — Medication 150 MILLIGRAM(S): at 14:09

## 2018-02-22 NOTE — PROGRESS NOTE ADULT - ASSESSMENT
83 y/o F with h/o COPD on 3L of O2 at home, HTN, CHF, DM, HLD, diverticulosis, breast ca s/p lumpectomy and RT, presents to the ED for urinary retention X 5 days and shortness of breath.  UA and urine cultures negative.  Pt with new BABS infiltrate.    Problem/Plan - 1:    ·	HCAP    - New BABS infiltrate.  Agree with broad spectrum abx.  Start vanco/azactam (pt develops SOB with PCN).  Pt without current fever, and denies purulent cough however      - Monitor WBC.  Leukocytosis likely multifactorial and related to steroids/infection?.  RVP negative.  If loose watery stools >3x/day, check cdiff    - Check Procalcitonin - (0.29)    - Cont managment of COPD as per primary team/Pulmonary    Will follow,    Tish Almanza  [phone]

## 2018-02-22 NOTE — PROGRESS NOTE ADULT - ASSESSMENT
85 y/o F with h/o COPD, CHF, HTN, HLD, DM, breast Ca s/p left lumpectomy and RT, diverticulosis presents to the ED for shortness of breath:  1. SOB - Acute on chronic diast CHF, now appears euvolemic, transient episodes of SOB, now with RUL PNA, c/w PO Lasix per Card  2.  HLD - c/w statin  3. HTN - BP elevated, plan per Cardio  4. COPD - Pulm appreciated, will need repeat CT chest in 4weeks for nodule f/u, c/w inhalers per Pulm, completed steroids  5. DVT prophylaxis  6. Anemia - Hb stable  7. PAM - Cr up today, likely 2/2 extra Lasix yesterday, Nephro f/u  8. Palpitations - NSVT, EPS apreciated, c/w BB  9. Leukocytosis - likely 2/2 PNA  10. RUL PNA - ID eval noted, c/w current abx

## 2018-02-22 NOTE — PROGRESS NOTE ADULT - PROBLEM SELECTOR PLAN 1
Patient with PAM likely hemodynamically mediated due to diuretic therapy in setting of ARB use and infection. Scr relatively stable. Continue to hold losartan and can restart once renal function improves. Avoid nephrotoxins.

## 2018-02-22 NOTE — PROGRESS NOTE ADULT - ASSESSMENT
85 y/o F with h/o COPD on 3L of O2 at home, HTN, DCHF, DM, HLD, diverticulosis, breast ca s/p lumpectomy and RT, presents  with  urinary retention  and shortness of breath    1. acute on chronic DCHF  appears euvolemia  echo with lv intracav gradient without sig AS, normal LV sys fx   c.o of  chest pressure / dyspnea likely  related to pulm infection  no evidence of ACS   avoid over diuresing in setting of pam and lvot gradient   continue with lasix 20 mg PO q 48 hrs   monitor creat levels   continue with BB     2.  Mild AS/  Moderate aortic regurgitation.   echo with lv intracav gradient without sig AS  continue with diuretics  continue with metoprolol      3. HTN, uncontrolled   slowly improving   cont hydralazine, norvasc, metoprolol   increase  imdur 60 mg daily     4. PAM   renal f.u   continue to trend     5.   COPD   s/p po steroids   repeat CT chest revealing decrease pleural effusions, bl groundglass opacities, 2 mm nodule in RUL   repeat chest xray revealing Patchy opacity in the left upper lung field  pulm f/u    6. Pna/  Leukocytosis   s/p po steroids   WBC remains elevated   rvp negative   repeat chest xray revealing Patchy opacity in the left upper lung field  ID/ pulm f/u   on iv abx     7. Afib   New onset  rate controlled   ekg revealing no evidence of ACS   CHADS 4 , a/c on hold due to anemia   start 325 ASA daily     8. Anemia   on iron supplement  monitor CBC   check occult stool   off plavix     dvt ppx 85 y/o F with h/o COPD on 3L of O2 at home, HTN, DCHF, DM, HLD, diverticulosis, breast ca s/p lumpectomy and RT, presents  with  urinary retention  and shortness of breath    1. acute on chronic DCHF  appears euvolemic  echo with lv intracav gradient without sig AS, normal LV sys fx   c.o of  chest pressure / dyspnea likely  related to pulm infection  no evidence of ACS   avoid over diuresing in setting of pam and lvot gradient   continue with lasix 20 mg PO q 48 hrs   monitor creat levels   continue with BB     2.  Mild AS/  Moderate aortic regurgitation.   echo with lv intracav gradient without sig AS  continue with diuretics  continue with metoprolol      3. HTN, uncontrolled   slowly improving   cont hydralazine, norvasc, metoprolol   increase  imdur 60 mg daily     4. PAM   renal f.u   continue to trend     5.   COPD   s/p po steroids   repeat CT chest revealing decrease pleural effusions, bl groundglass opacities, 2 mm nodule in RUL   repeat chest xray revealing Patchy opacity in the left upper lung field  pulm f/u    6. Pna/  Leukocytosis   s/p po steroids   WBC remains elevated   rvp negative   repeat chest xray revealing Patchy opacity in the left upper lung field  ID/ pulm f/u   on iv abx     7. Afib   New onset  rate controlled   ekg revealing no evidence of ACS   CHADS 4 , a/c on hold due to anemia   start 325 ASA daily     8. Anemia   on iron supplement  monitor CBC   check occult stool   off plavix     dvt ppx

## 2018-02-22 NOTE — PROGRESS NOTE ADULT - ATTENDING COMMENTS
[facility] NEPHROLOGY  Elliott Beltran M.D.  Marco Antonio Fernando D.O.  Tracie Mcfadden M.D.  Britney Marie, MSN, ANP-C    Telephone: [phone]  Facsimile: [phone]    [address] [place] [address]

## 2018-02-22 NOTE — PROGRESS NOTE ADULT - ATTENDING COMMENTS
Agree with above NP note.  cv stable  still with dyspnea likely related to chronic lung disease with poss new pna  iv abx  med/pulmo f/u  low dose lasix   bp control   no evidence of acs  af controlled  off a/c deu to anemia   cont asa

## 2018-02-23 LAB
BUN SERPL-MCNC: 63 MG/DL — HIGH (ref 7–23)
CALCIUM SERPL-MCNC: 9.6 MG/DL — SIGNIFICANT CHANGE UP (ref 8.4–10.5)
CHLORIDE SERPL-SCNC: 103 MMOL/L — SIGNIFICANT CHANGE UP (ref 98–107)
CO2 SERPL-SCNC: 21 MMOL/L — LOW (ref 22–31)
CREAT SERPL-MCNC: 1.57 MG/DL — HIGH (ref 0.5–1.3)
GLUCOSE SERPL-MCNC: 152 MG/DL — HIGH (ref 70–99)
HCT VFR BLD CALC: 23.7 % — LOW (ref 34.5–45)
HGB BLD-MCNC: 7.7 G/DL — LOW (ref 11.5–15.5)
MAGNESIUM SERPL-MCNC: 1.5 MG/DL — LOW (ref 1.6–2.6)
MCHC RBC-ENTMCNC: 30.6 PG — SIGNIFICANT CHANGE UP (ref 27–34)
MCHC RBC-ENTMCNC: 32.5 % — SIGNIFICANT CHANGE UP (ref 32–36)
MCV RBC AUTO: 94 FL — SIGNIFICANT CHANGE UP (ref 80–100)
NRBC # FLD: 0.02 — SIGNIFICANT CHANGE UP
OB PNL STL: NEGATIVE — SIGNIFICANT CHANGE UP
PLATELET # BLD AUTO: 397 K/UL — SIGNIFICANT CHANGE UP (ref 150–400)
PMV BLD: 11.1 FL — SIGNIFICANT CHANGE UP (ref 7–13)
POTASSIUM SERPL-MCNC: 4.3 MMOL/L — SIGNIFICANT CHANGE UP (ref 3.5–5.3)
POTASSIUM SERPL-SCNC: 4.3 MMOL/L — SIGNIFICANT CHANGE UP (ref 3.5–5.3)
RBC # BLD: 2.52 M/UL — LOW (ref 3.8–5.2)
RBC # FLD: 14.2 % — SIGNIFICANT CHANGE UP (ref 10.3–14.5)
SODIUM SERPL-SCNC: 140 MMOL/L — SIGNIFICANT CHANGE UP (ref 135–145)
VANCOMYCIN TROUGH SERPL-MCNC: 11.5 UG/ML — SIGNIFICANT CHANGE UP (ref 10–20)
WBC # BLD: 22.63 K/UL — HIGH (ref 3.8–10.5)
WBC # FLD AUTO: 22.63 K/UL — HIGH (ref 3.8–10.5)

## 2018-02-23 PROCEDURE — 99233 SBSQ HOSP IP/OBS HIGH 50: CPT

## 2018-02-23 PROCEDURE — 93010 ELECTROCARDIOGRAM REPORT: CPT

## 2018-02-23 PROCEDURE — 71045 X-RAY EXAM CHEST 1 VIEW: CPT | Mod: 26

## 2018-02-23 RX ORDER — IPRATROPIUM/ALBUTEROL SULFATE 18-103MCG
3 AEROSOL WITH ADAPTER (GRAM) INHALATION ONCE
Qty: 0 | Refills: 0 | Status: COMPLETED | OUTPATIENT
Start: 2018-02-23 | End: 2018-02-23

## 2018-02-23 RX ORDER — SIMETHICONE 80 MG/1
160 TABLET, CHEWABLE ORAL EVERY 12 HOURS
Qty: 0 | Refills: 0 | Status: COMPLETED | OUTPATIENT
Start: 2018-02-23 | End: 2018-02-25

## 2018-02-23 RX ADMIN — Medication 30 MILLILITER(S): at 17:28

## 2018-02-23 RX ADMIN — HEPARIN SODIUM 5000 UNIT(S): 5000 INJECTION INTRAVENOUS; SUBCUTANEOUS at 06:29

## 2018-02-23 RX ADMIN — LORATADINE 10 MILLIGRAM(S): 10 TABLET ORAL at 13:11

## 2018-02-23 RX ADMIN — Medication 75 MILLIGRAM(S): at 13:11

## 2018-02-23 RX ADMIN — SERTRALINE 100 MILLIGRAM(S): 25 TABLET, FILM COATED ORAL at 13:11

## 2018-02-23 RX ADMIN — ISOSORBIDE MONONITRATE 60 MILLIGRAM(S): 60 TABLET, EXTENDED RELEASE ORAL at 13:11

## 2018-02-23 RX ADMIN — ALBUTEROL 2 PUFF(S): 90 AEROSOL, METERED ORAL at 13:27

## 2018-02-23 RX ADMIN — Medication 50 MILLIGRAM(S): at 17:28

## 2018-02-23 RX ADMIN — Medication 325 MILLIGRAM(S): at 13:11

## 2018-02-23 RX ADMIN — Medication 3 MILLILITER(S): at 19:03

## 2018-02-23 RX ADMIN — SIMETHICONE 160 MILLIGRAM(S): 80 TABLET, CHEWABLE ORAL at 17:38

## 2018-02-23 RX ADMIN — PANTOPRAZOLE SODIUM 40 MILLIGRAM(S): 20 TABLET, DELAYED RELEASE ORAL at 06:24

## 2018-02-23 RX ADMIN — Medication 100 MILLIGRAM(S): at 06:24

## 2018-02-23 RX ADMIN — Medication 50 MILLIGRAM(S): at 06:26

## 2018-02-23 RX ADMIN — ATORVASTATIN CALCIUM 20 MILLIGRAM(S): 80 TABLET, FILM COATED ORAL at 21:57

## 2018-02-23 RX ADMIN — Medication 1000 UNIT(S): at 13:11

## 2018-02-23 RX ADMIN — Medication 650 MILLIGRAM(S): at 22:59

## 2018-02-23 RX ADMIN — Medication 3 MILLIGRAM(S): at 21:57

## 2018-02-23 RX ADMIN — Medication 650 MILLIGRAM(S): at 21:59

## 2018-02-23 RX ADMIN — Medication 1 TABLET(S): at 13:11

## 2018-02-23 RX ADMIN — Medication 75 MILLIGRAM(S): at 06:24

## 2018-02-23 RX ADMIN — Medication 100 MILLIGRAM(S): at 17:28

## 2018-02-23 RX ADMIN — Medication 75 MILLIGRAM(S): at 21:57

## 2018-02-23 RX ADMIN — HEPARIN SODIUM 5000 UNIT(S): 5000 INJECTION INTRAVENOUS; SUBCUTANEOUS at 17:28

## 2018-02-23 RX ADMIN — BUDESONIDE AND FORMOTEROL FUMARATE DIHYDRATE 2 PUFF(S): 160; 4.5 AEROSOL RESPIRATORY (INHALATION) at 12:27

## 2018-02-23 RX ADMIN — Medication 150 MILLIGRAM(S): at 15:21

## 2018-02-23 RX ADMIN — AMLODIPINE BESYLATE 10 MILLIGRAM(S): 2.5 TABLET ORAL at 06:24

## 2018-02-23 RX ADMIN — BUDESONIDE AND FORMOTEROL FUMARATE DIHYDRATE 2 PUFF(S): 160; 4.5 AEROSOL RESPIRATORY (INHALATION) at 21:59

## 2018-02-23 NOTE — PROGRESS NOTE ADULT - ATTENDING COMMENTS
[facility] NEPHROLOGY  Elliott Beltran M.D.  Marco Antonio Fernando D.O.  Tracie Mcfadden M.D.  Britney Marie, MSN, ANP-C    Telephone: [phone]  Facsimile: [phone]    [address] [place] [address]

## 2018-02-23 NOTE — PROGRESS NOTE ADULT - ATTENDING COMMENTS
Agree with above NP note.  clinically stable  wbc stable/improved  no fever  on iv abx for hcap  cxr with inc infiltrate  med/pulmo/id f/u appreciated  chest pain, atypical, no ecg change  cont supportive care  if continues will trend ce's  not a candidate for cath at present in light of multiple active med issues including anemia  not safe candidate for a/c  cont asa for af  af rate controlled  continue diuresis as ordered  dvt ppx

## 2018-02-23 NOTE — CHART NOTE - NSCHARTNOTEFT_GEN_A_CORE
called to see patient with mild chest pain.  patient stated it's more like heart burn.  Patient denies nausea, vomiting, EKG Atrial fib unchanged from 2/20  continue present care called to see patient with mild chest pain.  patient stated it's more like heart burn.  Patient denies nausea, vomiting, EKG Atrial fib unchanged from 2/20  continue present care.  will start simethecone 160mg BID x 2 days

## 2018-02-23 NOTE — PROGRESS NOTE ADULT - ASSESSMENT
83 y/o F with h/o COPD, CHF, HTN, HLD, DM, breast Ca s/p left lumpectomy and RT, diverticulosis presents to the ED for shortness of breath:  1. SOB - Acute on chronic diast CHF, now appears euvolemic, transient episodes of SOB, now with RUL PNA, c/w PO Lasix per Card  2.  HLD - c/w statin  3. HTN - BP elevated, plan per Cardio  4. COPD - Pulm appreciated, will need repeat CT chest in 4weeks for nodule f/u, c/w inhalers per Pulm, completed steroids  5. DVT prophylaxis  6. Anemia - Hb stable  7. PAM - Cr up today, likely 2/2 extra Lasix yesterday, Nephro f/u  8. Palpitations - NSVT, EPS apreciated, c/w BB  9. Leukocytosis - likely 2/2 PNA  10. RUL PNA - ID eval noted, c/w current abx  11. Epigastric/chest pain - trial of smimethicone

## 2018-02-23 NOTE — PROGRESS NOTE ADULT - ASSESSMENT
83 y/o F with h/o COPD on 3L of O2 at home, HTN, DCHF, DM, HLD, diverticulosis, breast ca s/p lumpectomy and RT, presents  with  urinary retention  and shortness of breath    1. acute on chronic DCHF  appears euvolemic  echo with lv intracav gradient without sig AS, normal LV sys fx   c.o of  chest pressure / dyspnea likely  related to pulm infection  no evidence of ACS   avoid over diuresing in setting of pam and lvot gradient   continue with lasix 20 mg PO q 48 hrs   monitor creat levels   continue with BB     2.  Mild AS/  Moderate aortic regurgitation.   echo with lv intracav gradient without sig AS  continue with diuretics  continue with metoprolol      3. HTN, uncontrolled   bp slowly  improving today   cont hydralazine, norvasc, metoprolol , imdur as ordered     4. PAM   renal f.u   continue to trend     5.   COPD   s/p po steroids   repeat CT chest revealing decrease pleural effusions, bl groundglass opacities, 2 mm nodule in RUL   repeat chest xray revealing pna   pulm f/u    6. Pna/  Leukocytosis   s/p po steroids   rvp neg   repeat chest xray revealing Patchy opacity in the left upper lung field, pna   ID/ pulm f/u   on iv abx     7. Afib   New onset  rate controlled   ekg revealing no evidence of ACS   CHADS 4 , a/c on hold due to anemia   continue 325 ASA daily     8. Anemia   on iron supplement  monitor CBC   occult stool neg   off plavix     dvt ppx

## 2018-02-23 NOTE — PROGRESS NOTE ADULT - ATTENDING COMMENTS
Pulmonary covering Dr. Saucedo Pulmonary covering Dr. Saucedo    d/w Bedside PA if cp continues may need to check Cardiac enzymes

## 2018-02-23 NOTE — PROGRESS NOTE ADULT - ASSESSMENT
85 y/o F with h/o COPD on 3L of O2 at home, HTN, CHF, DM, HLD, diverticulosis, breast ca s/p lumpectomy and RT, presents to the ED for urinary retention X 5 days and shortness of breath.  UA and urine cultures negative.  Pt with new BABS infiltrate.    Problem/Plan - 1:    ·	HCAP    - New BABS infiltrate.  Agree with broad spectrum abx.  Start vanco/azactam (pt develops SOB with PCN).  Pt without current fever, and denies purulent cough however.  Repeat chest xray on 2/23 with increased infiltrate on BABS.  Will cont to monitor    - Monitor WBC.  Leukocytosis likely multifactorial and related to steroids/infection?.  RVP negative.  WBC improved on 2/23    - Repeat PCT In AM    - Cont managment of COPD as per primary team/Pulmonary    - Monitor for new fever, if spikes >100.4 check blood cultures x 2    Will follow,    Tish Almanza  [phone]

## 2018-02-24 LAB
B PERT DNA SPEC QL NAA+PROBE: SIGNIFICANT CHANGE UP
BASE EXCESS BLDA CALC-SCNC: -5.7 MMOL/L — SIGNIFICANT CHANGE UP
BLD GP AB SCN SERPL QL: NEGATIVE — SIGNIFICANT CHANGE UP
BUN SERPL-MCNC: 73 MG/DL — HIGH (ref 7–23)
C PNEUM DNA SPEC QL NAA+PROBE: NOT DETECTED — SIGNIFICANT CHANGE UP
CALCIUM SERPL-MCNC: 9.3 MG/DL — SIGNIFICANT CHANGE UP (ref 8.4–10.5)
CHLORIDE SERPL-SCNC: 101 MMOL/L — SIGNIFICANT CHANGE UP (ref 98–107)
CO2 SERPL-SCNC: 21 MMOL/L — LOW (ref 22–31)
CREAT SERPL-MCNC: 1.54 MG/DL — HIGH (ref 0.5–1.3)
FLUAV H1 2009 PAND RNA SPEC QL NAA+PROBE: NOT DETECTED — SIGNIFICANT CHANGE UP
FLUAV H1 RNA SPEC QL NAA+PROBE: NOT DETECTED — SIGNIFICANT CHANGE UP
FLUAV H3 RNA SPEC QL NAA+PROBE: NOT DETECTED — SIGNIFICANT CHANGE UP
FLUAV SUBTYP SPEC NAA+PROBE: SIGNIFICANT CHANGE UP
FLUBV RNA SPEC QL NAA+PROBE: NOT DETECTED — SIGNIFICANT CHANGE UP
GLUCOSE SERPL-MCNC: 145 MG/DL — HIGH (ref 70–99)
HADV DNA SPEC QL NAA+PROBE: NOT DETECTED — SIGNIFICANT CHANGE UP
HCO3 BLDA-SCNC: 20 MMOL/L — LOW (ref 22–26)
HCOV 229E RNA SPEC QL NAA+PROBE: NOT DETECTED — SIGNIFICANT CHANGE UP
HCOV HKU1 RNA SPEC QL NAA+PROBE: NOT DETECTED — SIGNIFICANT CHANGE UP
HCOV NL63 RNA SPEC QL NAA+PROBE: NOT DETECTED — SIGNIFICANT CHANGE UP
HCOV OC43 RNA SPEC QL NAA+PROBE: NOT DETECTED — SIGNIFICANT CHANGE UP
HCT VFR BLD CALC: 21.7 % — LOW (ref 34.5–45)
HCT VFR BLD CALC: 22.3 % — LOW (ref 34.5–45)
HGB BLD-MCNC: 7.1 G/DL — LOW (ref 11.5–15.5)
HGB BLD-MCNC: 7.5 G/DL — LOW (ref 11.5–15.5)
HMPV RNA SPEC QL NAA+PROBE: NOT DETECTED — SIGNIFICANT CHANGE UP
HPIV1 RNA SPEC QL NAA+PROBE: NOT DETECTED — SIGNIFICANT CHANGE UP
HPIV2 RNA SPEC QL NAA+PROBE: NOT DETECTED — SIGNIFICANT CHANGE UP
HPIV3 RNA SPEC QL NAA+PROBE: NOT DETECTED — SIGNIFICANT CHANGE UP
HPIV4 RNA SPEC QL NAA+PROBE: NOT DETECTED — SIGNIFICANT CHANGE UP
M PNEUMO DNA SPEC QL NAA+PROBE: NOT DETECTED — SIGNIFICANT CHANGE UP
MAGNESIUM SERPL-MCNC: 1.3 MG/DL — LOW (ref 1.6–2.6)
MCHC RBC-ENTMCNC: 30.7 PG — SIGNIFICANT CHANGE UP (ref 27–34)
MCHC RBC-ENTMCNC: 31.9 PG — SIGNIFICANT CHANGE UP (ref 27–34)
MCHC RBC-ENTMCNC: 32.7 % — SIGNIFICANT CHANGE UP (ref 32–36)
MCHC RBC-ENTMCNC: 33.6 % — SIGNIFICANT CHANGE UP (ref 32–36)
MCV RBC AUTO: 93.9 FL — SIGNIFICANT CHANGE UP (ref 80–100)
MCV RBC AUTO: 94.9 FL — SIGNIFICANT CHANGE UP (ref 80–100)
NRBC # FLD: 0.08 — SIGNIFICANT CHANGE UP
NRBC # FLD: 0.09 — SIGNIFICANT CHANGE UP
OB PNL STL: NEGATIVE — SIGNIFICANT CHANGE UP
PCO2 BLDA: 30 MMHG — LOW (ref 32–48)
PH BLDA: 7.4 PH — SIGNIFICANT CHANGE UP (ref 7.35–7.45)
PLATELET # BLD AUTO: 460 K/UL — HIGH (ref 150–400)
PLATELET # BLD AUTO: 486 K/UL — HIGH (ref 150–400)
PMV BLD: 10.8 FL — SIGNIFICANT CHANGE UP (ref 7–13)
PMV BLD: 11 FL — SIGNIFICANT CHANGE UP (ref 7–13)
PO2 BLDA: 58 MMHG — LOW (ref 83–108)
POTASSIUM SERPL-MCNC: 4.7 MMOL/L — SIGNIFICANT CHANGE UP (ref 3.5–5.3)
POTASSIUM SERPL-SCNC: 4.7 MMOL/L — SIGNIFICANT CHANGE UP (ref 3.5–5.3)
RBC # BLD: 2.31 M/UL — LOW (ref 3.8–5.2)
RBC # BLD: 2.35 M/UL — LOW (ref 3.8–5.2)
RBC # FLD: 14.5 % — SIGNIFICANT CHANGE UP (ref 10.3–14.5)
RBC # FLD: 14.5 % — SIGNIFICANT CHANGE UP (ref 10.3–14.5)
RH IG SCN BLD-IMP: POSITIVE — SIGNIFICANT CHANGE UP
RSV RNA SPEC QL NAA+PROBE: NOT DETECTED — SIGNIFICANT CHANGE UP
RV+EV RNA SPEC QL NAA+PROBE: NOT DETECTED — SIGNIFICANT CHANGE UP
SAO2 % BLDA: 88 % — LOW (ref 95–99)
SODIUM SERPL-SCNC: 140 MMOL/L — SIGNIFICANT CHANGE UP (ref 135–145)
WBC # BLD: 26.39 K/UL — HIGH (ref 3.8–10.5)
WBC # BLD: 30.86 K/UL — HIGH (ref 3.8–10.5)
WBC # FLD AUTO: 26.39 K/UL — HIGH (ref 3.8–10.5)
WBC # FLD AUTO: 30.86 K/UL — HIGH (ref 3.8–10.5)

## 2018-02-24 PROCEDURE — 71250 CT THORAX DX C-: CPT | Mod: 26

## 2018-02-24 PROCEDURE — 71045 X-RAY EXAM CHEST 1 VIEW: CPT | Mod: 26

## 2018-02-24 PROCEDURE — 99222 1ST HOSP IP/OBS MODERATE 55: CPT

## 2018-02-24 RX ORDER — MAGNESIUM SULFATE 500 MG/ML
2 VIAL (ML) INJECTION ONCE
Qty: 0 | Refills: 0 | Status: COMPLETED | OUTPATIENT
Start: 2018-02-24 | End: 2018-02-24

## 2018-02-24 RX ORDER — AZITHROMYCIN 500 MG/1
500 TABLET, FILM COATED ORAL ONCE
Qty: 0 | Refills: 0 | Status: COMPLETED | OUTPATIENT
Start: 2018-02-24 | End: 2018-02-24

## 2018-02-24 RX ORDER — SODIUM CHLORIDE 9 MG/ML
4 INJECTION INTRAMUSCULAR; INTRAVENOUS; SUBCUTANEOUS
Qty: 0 | Refills: 0 | Status: DISCONTINUED | OUTPATIENT
Start: 2018-02-24 | End: 2018-02-25

## 2018-02-24 RX ORDER — MICAFUNGIN SODIUM 100 MG/1
100 INJECTION, POWDER, LYOPHILIZED, FOR SOLUTION INTRAVENOUS EVERY 24 HOURS
Qty: 0 | Refills: 0 | Status: DISCONTINUED | OUTPATIENT
Start: 2018-02-25 | End: 2018-02-25

## 2018-02-24 RX ORDER — FUROSEMIDE 40 MG
40 TABLET ORAL ONCE
Qty: 0 | Refills: 0 | Status: COMPLETED | OUTPATIENT
Start: 2018-02-24 | End: 2018-02-24

## 2018-02-24 RX ORDER — IPRATROPIUM/ALBUTEROL SULFATE 18-103MCG
3 AEROSOL WITH ADAPTER (GRAM) INHALATION ONCE
Qty: 0 | Refills: 0 | Status: COMPLETED | OUTPATIENT
Start: 2018-02-24 | End: 2018-02-24

## 2018-02-24 RX ORDER — SODIUM CHLORIDE 9 MG/ML
10 INJECTION INTRAMUSCULAR; INTRAVENOUS; SUBCUTANEOUS
Qty: 0 | Refills: 0 | Status: DISCONTINUED | OUTPATIENT
Start: 2018-02-24 | End: 2018-02-24

## 2018-02-24 RX ORDER — AZITHROMYCIN 500 MG/1
TABLET, FILM COATED ORAL
Qty: 0 | Refills: 0 | Status: DISCONTINUED | OUTPATIENT
Start: 2018-02-24 | End: 2018-02-25

## 2018-02-24 RX ORDER — GENTAMICIN SULFATE 40 MG/ML
100 VIAL (ML) INJECTION ONCE
Qty: 0 | Refills: 0 | Status: COMPLETED | OUTPATIENT
Start: 2018-02-24 | End: 2018-02-24

## 2018-02-24 RX ORDER — AZITHROMYCIN 500 MG/1
500 TABLET, FILM COATED ORAL EVERY 24 HOURS
Qty: 0 | Refills: 0 | Status: DISCONTINUED | OUTPATIENT
Start: 2018-02-25 | End: 2018-02-25

## 2018-02-24 RX ORDER — MICAFUNGIN SODIUM 100 MG/1
INJECTION, POWDER, LYOPHILIZED, FOR SOLUTION INTRAVENOUS
Qty: 0 | Refills: 0 | Status: DISCONTINUED | OUTPATIENT
Start: 2018-02-24 | End: 2018-02-25

## 2018-02-24 RX ORDER — MICAFUNGIN SODIUM 100 MG/1
100 INJECTION, POWDER, LYOPHILIZED, FOR SOLUTION INTRAVENOUS ONCE
Qty: 0 | Refills: 0 | Status: COMPLETED | OUTPATIENT
Start: 2018-02-24 | End: 2018-02-24

## 2018-02-24 RX ORDER — LACTOBACILLUS ACIDOPHILUS 100MM CELL
1 CAPSULE ORAL DAILY
Qty: 0 | Refills: 0 | Status: DISCONTINUED | OUTPATIENT
Start: 2018-02-24 | End: 2018-02-25

## 2018-02-24 RX ADMIN — Medication 50 MILLIGRAM(S): at 17:32

## 2018-02-24 RX ADMIN — Medication 50 GRAM(S): at 11:48

## 2018-02-24 RX ADMIN — Medication 650 MILLIGRAM(S): at 22:11

## 2018-02-24 RX ADMIN — SIMETHICONE 160 MILLIGRAM(S): 80 TABLET, CHEWABLE ORAL at 17:32

## 2018-02-24 RX ADMIN — Medication 1 TABLET(S): at 11:48

## 2018-02-24 RX ADMIN — Medication 40 MILLIGRAM(S): at 17:00

## 2018-02-24 RX ADMIN — AMLODIPINE BESYLATE 10 MILLIGRAM(S): 2.5 TABLET ORAL at 05:57

## 2018-02-24 RX ADMIN — ATORVASTATIN CALCIUM 20 MILLIGRAM(S): 80 TABLET, FILM COATED ORAL at 21:30

## 2018-02-24 RX ADMIN — Medication 40 MILLIGRAM(S): at 14:21

## 2018-02-24 RX ADMIN — Medication 20 MILLIGRAM(S): at 05:57

## 2018-02-24 RX ADMIN — Medication 75 MILLIGRAM(S): at 21:30

## 2018-02-24 RX ADMIN — MICAFUNGIN SODIUM 105 MILLIGRAM(S): 100 INJECTION, POWDER, LYOPHILIZED, FOR SOLUTION INTRAVENOUS at 14:21

## 2018-02-24 RX ADMIN — Medication 100 MILLIGRAM(S): at 05:57

## 2018-02-24 RX ADMIN — Medication 150 MILLIGRAM(S): at 15:45

## 2018-02-24 RX ADMIN — Medication 200 MILLIGRAM(S): at 15:45

## 2018-02-24 RX ADMIN — Medication 325 MILLIGRAM(S): at 11:49

## 2018-02-24 RX ADMIN — Medication 100 MILLIGRAM(S): at 17:32

## 2018-02-24 RX ADMIN — SIMETHICONE 160 MILLIGRAM(S): 80 TABLET, CHEWABLE ORAL at 05:58

## 2018-02-24 RX ADMIN — BENZOCAINE AND MENTHOL 1 LOZENGE: 5; 1 LIQUID ORAL at 21:48

## 2018-02-24 RX ADMIN — Medication 3 MILLILITER(S): at 21:43

## 2018-02-24 RX ADMIN — AZITHROMYCIN 250 MILLIGRAM(S): 500 TABLET, FILM COATED ORAL at 18:20

## 2018-02-24 RX ADMIN — ISOSORBIDE MONONITRATE 60 MILLIGRAM(S): 60 TABLET, EXTENDED RELEASE ORAL at 11:48

## 2018-02-24 RX ADMIN — Medication 50 MILLIGRAM(S): at 05:56

## 2018-02-24 RX ADMIN — Medication 75 MILLIGRAM(S): at 05:57

## 2018-02-24 RX ADMIN — SODIUM CHLORIDE 4 MILLILITER(S): 9 INJECTION INTRAMUSCULAR; INTRAVENOUS; SUBCUTANEOUS at 22:34

## 2018-02-24 RX ADMIN — Medication 325 MILLIGRAM(S): at 11:48

## 2018-02-24 RX ADMIN — PANTOPRAZOLE SODIUM 40 MILLIGRAM(S): 20 TABLET, DELAYED RELEASE ORAL at 05:57

## 2018-02-24 RX ADMIN — BUDESONIDE AND FORMOTEROL FUMARATE DIHYDRATE 2 PUFF(S): 160; 4.5 AEROSOL RESPIRATORY (INHALATION) at 09:57

## 2018-02-24 RX ADMIN — Medication 3 MILLILITER(S): at 20:23

## 2018-02-24 RX ADMIN — CINACALCET 30 MILLIGRAM(S): 30 TABLET, FILM COATED ORAL at 09:57

## 2018-02-24 RX ADMIN — HEPARIN SODIUM 5000 UNIT(S): 5000 INJECTION INTRAVENOUS; SUBCUTANEOUS at 17:32

## 2018-02-24 RX ADMIN — Medication 3 MILLILITER(S): at 17:02

## 2018-02-24 RX ADMIN — Medication 1000 UNIT(S): at 11:48

## 2018-02-24 RX ADMIN — Medication 650 MILLIGRAM(S): at 21:34

## 2018-02-24 RX ADMIN — SERTRALINE 100 MILLIGRAM(S): 25 TABLET, FILM COATED ORAL at 11:48

## 2018-02-24 RX ADMIN — Medication 3 MILLIGRAM(S): at 21:30

## 2018-02-24 RX ADMIN — BUDESONIDE AND FORMOTEROL FUMARATE DIHYDRATE 2 PUFF(S): 160; 4.5 AEROSOL RESPIRATORY (INHALATION) at 21:30

## 2018-02-24 RX ADMIN — Medication 75 MILLIGRAM(S): at 15:45

## 2018-02-24 RX ADMIN — LORATADINE 10 MILLIGRAM(S): 10 TABLET ORAL at 11:48

## 2018-02-24 NOTE — CONSULT NOTE ADULT - ATTENDING COMMENTS
NSVT in setting of heart failure, severe AS, htn. Would continue to treat fluid overload. Continue BP control and increase beta blockers for now. If continues to have NSVT, consider verapamil. Will continue to monitor on tele.
pt is an 85 yo female  hx copd on home o2 presented with chf now with  left sided opacity treated with vanco/azactam.  Pt had ct showing   bronchoalveolar disease, rvp negative from 2.18. Pt with wheezing  b/l.      -copd exacerbation . r/o pneumonia  -S/p lasix, on nonrebreather, continue duonebs   -suggest azithromycin, f/u ox sat.  check abg  pulmonary f/u  pt does not require icu at the present, please reconsult if condition changes.  reconsult
[facility] NEPHROLOGY  Elliott Beltran M.D.  Marco Antonio Fernando D.O.  Tracie Mcfadden M.D.  Britney Marie, MSN, ANP-C    Telephone: [phone]  Facsimile: [phone]    [address] [place] [address]

## 2018-02-24 NOTE — CHART NOTE - NSCHARTNOTEFT_GEN_A_CORE
PA was making round and found pt was diaphoretic, sob slight lethargic but arousable.  She c/o SOB, no nausea, vomiting, no fever, no abd pain  Vital Signs Last 24 Hrs  T(C): 36.7 (24 Feb 2018 15:41), Max: 36.7 (23 Feb 2018 21:57)  T(F): 98.1 (24 Feb 2018 15:41), Max: 98.1 (24 Feb 2018 05:55)  HR: 70 (24 Feb 2018 15:41) (63 - 81)  BP: 176/50 (24 Feb 2018 15:41) (143/93 - 176/50)  BP(mean): --  RR: 20 (24 Feb 2018 15:41) (18 - 20)  SpO2: 97% (24 Feb 2018 15:41) (92% - 98%)  Tele- Afib in 80s  PE- heart- s1,s2, lungs- Crackles L>R abd- firm, nontender, Ext- neg e/c/c  lab-                       7.5    30.86 )-----------( 486      ( 24 Feb 2018 11:37 )             22.3     24 Feb 2018 07:45    140    |  101    |  73     ----------------------------<  145    4.7     |  21     |  1.54     Ca    9.3        24 Feb 2018 07:45  Mg     1.3       24 Feb 2018 07:45    A/P Respiratory Distress  2/2 Sepsis-- ABG, Oxygen therapy, addition of antibiotics ICU consult-P  lasix 40mg IVPx1, lasix 40mg IVP after blood transfusion  CT Chest

## 2018-02-24 NOTE — CONSULT NOTE ADULT - ASSESSMENT
83 y/o F with h/o COPD, CHF, HTN, HLD, DM, breast Ca s/p left lumpectomy and RT, diverticulosis presents to the ED for shortness of breath:  1. SOB - Acute on chronic diast CHF, diuresis per Cardio, now improving, I/O, TTE  2. Urinary retention - currently resolved, monitor residuals  3. HLD - c/w statin  4. HTN - BP improved, monitor on current meds, avoid overcorrection  5. DM2 - pt not on any meds after 40lb weight loss, may start on ISS  6. COPD - in remission, no wheezing, not on any standing inhalers at this time, Albuterol PRN  7. DVT prophylaxis
84 female with history of CHF presents with SOB. Nephrology consulted for proteinuria.
85 y/o F with h/o COPD, CHF, HTN, HLD, DM, breast Ca s/p left lumpectomy and RT, diverticulosis presents to the ED for shortness of breath. Admit to telemetry.
85 y/o woman with heart disease, PAM. Worsening Hypoxia with abnormal chest CT. Pulmonary and ID following. Now on micafungin as well as ABX.    1) Leukocytosis - significant rise as inpatient is likely related to a reactive response. Unlikley to be a Myeloproliferative disease process. Would monitor WBC for now. Will plan to order an abdominal sono in AM  2) Thrombocytosis- also likely reactive. Patient is on ASA.  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   - monitor h/h  -If hgb declines furher would consider transfusional support.  check FOBT
83 y/o F with h/o COPD on 3L of O2 at home, HTN, CHF, DM, HLD, diverticulosis, breast ca s/p lumpectomy and RT, presents to the ED for urinary retention X 5 days and shortness of breath.  UA and urine cultures negative.  Pt with new BABS infiltrate.    Problem/Plan - 1:    ·	HCAP    - New BABS infiltrate.  Agree with broad spectrum abx.  Start vanco/azactam (pt develops SOB with PCN).  Pt without current fever, and denies purulent cough.      - Monitor WBC.  Leukocytosis likely multifactorial and related to steroids/infection.  RVP negative.    - Check Procalcitonin    - Cont managment of COPD as per primary team/Pulmonary    Will follow,    Tish Almanza  [phone]
84 y.o. female PMH CHF, COPD (on 3L NC @ home), DM p/w SOB a/w acute decompensated heart failure on Feb 13 now with worsening hypoxia and imaging concerning for respiratory infection.    #  Hypoxic respiratory failure likely 2/2 respiratory infection.    - Duonebs x 2, then repeat ABG  - c/w Nonrebreather  - c/w Vancomycin, Aztreonam, and Micafungin per ID  - consider adding Azithromycin and send a urine Legionella  - Repeat RVP  - Not a MICU candidate at this time, will continue to follow.  Please reconsult as necessary

## 2018-02-24 NOTE — CONSULT NOTE ADULT - CONSULT REQUESTED DATE/TIME
14-Feb-2018 10:51
14-Feb-2018 13:12
16-Feb-2018 16:35
21-Feb-2018 12:50
24-Feb-2018 16:01
24-Feb-2018 21:45
14-Feb-2018 10:34

## 2018-02-24 NOTE — PROGRESS NOTE ADULT - ASSESSMENT
85 y/o Female with h/o COPD, CHF, HTN, HLD, DM, breast Ca s/p left lumpectomy and RT, diverticulosis presents to the ED for shortness of breath:  1. SOB - Acute on chronic diast CHF, now appears euvolemic,   RUL PNA, c/w abs  2.  HLD - c/w statin  3. HTN - cont meds    4. COPD - Pulm appreciated, will need repeat CT chest in 4weeks for nodule f/u, c/w inhalers per Pulm, completed steroids  5. DVT prophylaxis  6. Anemia - /check guaiac  7. PAM - on likely ckd  renal f/u  8. Palpitations - NSVT, EPS apreciated, c/w BB  9. Leukocytosis - likely 2/2 PNA/steroids  10. RUL PNA - ID eval noted, c/w current abx  ood

## 2018-02-24 NOTE — PROGRESS NOTE ADULT - ATTENDING COMMENTS
[facility] NEPHROLOGY  Elliott Beltran M.D.  Marco Antonio Fernando D.O.  Tracie Mcfadden M.D.  Britney Marie, MSN, ANP-C    Telephone: [phone]  Facsimile: [phone]    [address] [place] [address]

## 2018-02-24 NOTE — CONSULT NOTE ADULT - SUBJECTIVE AND OBJECTIVE BOX
CHIEF COMPLAINT:  SOB    HPI:  84 y.o. female PMH CHF, COPD (on 3L NC @ home), DM p/w SOB a/w acute decompensated heart failure on , was diuresed IV then PO.  Now worsening hypoxia with new multifocal infiltrates on Chest CT today.  Patient on Vancomycin and Aztreonam since , Micafungin added today empirically.  RVP negative, no sputum culture.    PAST MEDICAL & SURGICAL HISTORY:  Congestive heart failure (CHF)  Pulmonary hypertension  Emphysema of lung  Femoral Artery Thrombosis: partiac oclusion with no inteventions as per Pt on ASA Plavix  History of Colonoscopy with Polypectomy  History of Hysterectomy  Diverticulosis of Colon  Breast Cancer: S/P Lt lumpectomy &amp; RT  Dyslipidemia  DM (Diabetes Mellitus)  HTN (Hypertension)  COPD (Chronic Obstructive Pulmonary Disease)  History of tonsillectomy  S/P lumpectomy, left breast:   H/O total hysterectomy:       FAMILY HISTORY:  No pertinent family history in first degree relatives      SOCIAL HISTORY:  Smokin pack years  EtOH Use:  None    Advance Directives:    Allergies    lisinopril (Anaphylaxis)  penicillin (Anaphylaxis)    Intolerances        HOME MEDICATIONS:    REVIEW OF SYSTEMS:  Constitutional:    Eyes:  ENT:  CV:  Resp:  GI:  :  MSK:  Integumentary:  Neurological:  Psychiatric:  Endocrine:  Hematologic/Lymphatic:  Allergic/Immunologic:  [ ] All other systems negative  [ ] Unable to assess ROS because ________    OBJECTIVE:  ICU Vital Signs Last 24 Hrs  T(C): 36.7 (2018 15:41), Max: 36.7 (2018 21:57)  T(F): 98.1 (2018 15:41), Max: 98.1 (2018 05:55)  HR: 70 (2018 16:33) (63 - 81)  BP: 155/50 (2018 16:33) (143/93 - 176/50)  BP(mean): --  ABP: --  ABP(mean): --  RR: 20 (2018 15:41) (18 - 20)  SpO2: 97% (2018 15:41) (92% - 98%)         @ 07:01  -   @ 07:00  --------------------------------------------------------  IN: 500 mL / OUT: 400 mL / NET: 100 mL      CAPILLARY BLOOD GLUCOSE          PHYSICAL EXAM:  General:   HEENT:   Lymph Nodes:  Neck:   Respiratory:   Cardiovascular:   Abdomen:   Extremities:   Skin:   Neurological:  Psychiatry:    HOSPITAL MEDICATIONS:  MEDICATIONS  (STANDING):  ALBUTerol/ipratropium for Nebulization. 3 milliLiter(s) Nebulizer once  ALBUTerol/ipratropium for Nebulization. 3 milliLiter(s) Nebulizer once  ALBUTerol/ipratropium for Nebulization. 3 milliLiter(s) Nebulizer once  amLODIPine   Tablet 10 milliGRAM(s) Oral daily  aspirin 325 milliGRAM(s) Oral daily  atorvastatin 20 milliGRAM(s) Oral at bedtime  aztreonam  IVPB 1000 milliGRAM(s) IV Intermittent every 12 hours  buDESOnide 160 MICROgram(s)/formoterol 4.5 MICROgram(s) Inhaler 2 Puff(s) Inhalation two times a day  cholecalciferol 1000 Unit(s) Oral daily  cinacalcet 30 milliGRAM(s) Oral <User Schedule>  ferrous    sulfate 325 milliGRAM(s) Oral daily  furosemide    Tablet 20 milliGRAM(s) Oral every 48 hours  heparin  Injectable 5000 Unit(s) SubCutaneous every 12 hours  hydrALAZINE 75 milliGRAM(s) Oral three times a day  isosorbide   mononitrate ER Tablet (IMDUR) 60 milliGRAM(s) Oral daily  lactobacillus acidophilus 1 Tablet(s) Oral daily  loratadine 10 milliGRAM(s) Oral daily  melatonin 3 milliGRAM(s) Oral at bedtime  metoprolol     tartrate 100 milliGRAM(s) Oral two times a day  micafungin IVPB      multivitamin 1 Tablet(s) Oral daily  pantoprazole    Tablet 40 milliGRAM(s) Oral before breakfast  sertraline 100 milliGRAM(s) Oral daily  simethicone 160 milliGRAM(s) Chew every 12 hours  sodium chloride 3%  Inhalation 10 milliLiter(s) Inhalation four times a day  vancomycin  IVPB 750 milliGRAM(s) IV Intermittent every 24 hours    MEDICATIONS  (PRN):  acetaminophen   Tablet. 650 milliGRAM(s) Oral every 6 hours PRN Mild, moderate, or severe, or temp >99.0F, or at discretion of provider  ALBUTerol    90 MICROgram(s) HFA Inhaler 2 Puff(s) Inhalation every 6 hours PRN Bronchospasm  aluminum hydroxide/magnesium hydroxide/simethicone Suspension 30 milliLiter(s) Oral every 6 hours PRN Dyspepsia  benzocaine 15 mG/menthol 3.6 mG Lozenge 1 Lozenge Oral every 4 hours PRN Sore Throat      LABS:                        7.5    30.86 )-----------( 486      ( 2018 11:37 )             22.3         140  |  101  |  73<H>  ----------------------------<  145<H>  4.7   |  21<L>  |  1.54<H>    Ca    9.3      2018 07:45  Mg     1.3               Arterial Blood Gas:   @ 16:00  7.40/30/58/20/88.0/-5.7  ABG lactate: --        MICROBIOLOGY:     RADIOLOGY:  [ ] Reviewed and interpreted by me    EKG: CHIEF COMPLAINT:  SOB    HPI:  84 y.o. female PMH CHF, COPD (on 3L NC @ home), DM p/w SOB a/w acute decompensated heart failure on , was diuresed IV then PO.  Now worsening hypoxia with new multifocal infiltrates on Chest CT today.  Patient on Vancomycin and Aztreonam since , Micafungin added today empirically due to lack of response to antibiotics.  RVP negative, no sputum culture.  Is on symbicort, no systemic steroids or bronchodilators.      PAST MEDICAL & SURGICAL HISTORY:  Congestive heart failure (CHF)  Pulmonary hypertension  Emphysema of lung  Femoral Artery Thrombosis: partial occlusion with no interventions as per Pt on ASA Plavix  History of Colonoscopy with Polypectomy  History of Hysterectomy  Diverticulosis of Colon  Breast Cancer: S/P Lt lumpectomy &amp; RT  Dyslipidemia  DM (Diabetes Mellitus)  HTN (Hypertension)  COPD (Chronic Obstructive Pulmonary Disease)  History of tonsillectomy  S/P lumpectomy, left breast:   H/O total hysterectomy:       FAMILY HISTORY:  No pertinent family history in first degree relatives      SOCIAL HISTORY:  Smokin pack years  EtOH Use:  None    Advance Directives:    Allergies    lisinopril (Anaphylaxis)  penicillin (Anaphylaxis)    Intolerances        HOME MEDICATIONS:    REVIEW OF SYSTEMS:  Constitutional:  Gets sweaty when breathing fast, no fevers  ENT:  No sore throat  CV:  No chest pain or palpitations  Resp:  Short of breath, but feels better on nonrebreather.  No cough, no sputum  GI:  No N/V/D, no abdominal pain  :  Reduced urine  MSK:  No muscle aches  Integumentary:  NO rash  Neurological:  No headache or dizziness      OBJECTIVE:  ICU Vital Signs Last 24 Hrs  T(C): 36.7 (2018 15:41), Max: 36.7 (2018 21:57)  T(F): 98.1 (2018 15:41), Max: 98.1 (2018 05:55)  HR: 70 (2018 16:33) (63 - 81)  BP: 155/50 (2018 16:33) (143/93 - 176/50)  BP(mean): --  ABP: --  ABP(mean): --  RR: 20 (2018 15:41) (18 - 20)  SpO2: 97% (2018 15:41) (92% - 98%)         @ 07:01  -   @ 07:00  --------------------------------------------------------  IN: 500 mL / OUT: 400 mL / NET: 100 mL      CAPILLARY BLOOD GLUCOSE          PHYSICAL EXAM:  General:  Mild respiratory distress, awake and alert  HEENT:  Dry MMM  Lymph Nodes:  No cervical LAD  Neck:  Normal JV pulse  Respiratory:  Tachypneic, suprasternal retractions, however improving on nonbreather.  Diffuse coarse breath sounds with end expiratory wheezes.  Cardiovascular:  RRR, no M/R/G  Abdomen:  ND, NT, BS+  Extremities:  No edema  Skin:  No rash  Neurological:  Nonfocal  Psychiatry:  Normal affect    HOSPITAL MEDICATIONS:  MEDICATIONS  (STANDING):  ALBUTerol/ipratropium for Nebulization. 3 milliLiter(s) Nebulizer once  ALBUTerol/ipratropium for Nebulization. 3 milliLiter(s) Nebulizer once  ALBUTerol/ipratropium for Nebulization. 3 milliLiter(s) Nebulizer once  amLODIPine   Tablet 10 milliGRAM(s) Oral daily  aspirin 325 milliGRAM(s) Oral daily  atorvastatin 20 milliGRAM(s) Oral at bedtime  aztreonam  IVPB 1000 milliGRAM(s) IV Intermittent every 12 hours  buDESOnide 160 MICROgram(s)/formoterol 4.5 MICROgram(s) Inhaler 2 Puff(s) Inhalation two times a day  cholecalciferol 1000 Unit(s) Oral daily  cinacalcet 30 milliGRAM(s) Oral <User Schedule>  ferrous    sulfate 325 milliGRAM(s) Oral daily  furosemide    Tablet 20 milliGRAM(s) Oral every 48 hours  heparin  Injectable 5000 Unit(s) SubCutaneous every 12 hours  hydrALAZINE 75 milliGRAM(s) Oral three times a day  isosorbide   mononitrate ER Tablet (IMDUR) 60 milliGRAM(s) Oral daily  lactobacillus acidophilus 1 Tablet(s) Oral daily  loratadine 10 milliGRAM(s) Oral daily  melatonin 3 milliGRAM(s) Oral at bedtime  metoprolol     tartrate 100 milliGRAM(s) Oral two times a day  micafungin IVPB      multivitamin 1 Tablet(s) Oral daily  pantoprazole    Tablet 40 milliGRAM(s) Oral before breakfast  sertraline 100 milliGRAM(s) Oral daily  simethicone 160 milliGRAM(s) Chew every 12 hours  sodium chloride 3%  Inhalation 10 milliLiter(s) Inhalation four times a day  vancomycin  IVPB 750 milliGRAM(s) IV Intermittent every 24 hours    MEDICATIONS  (PRN):  acetaminophen   Tablet. 650 milliGRAM(s) Oral every 6 hours PRN Mild, moderate, or severe, or temp >99.0F, or at discretion of provider  ALBUTerol    90 MICROgram(s) HFA Inhaler 2 Puff(s) Inhalation every 6 hours PRN Bronchospasm  aluminum hydroxide/magnesium hydroxide/simethicone Suspension 30 milliLiter(s) Oral every 6 hours PRN Dyspepsia  benzocaine 15 mG/menthol 3.6 mG Lozenge 1 Lozenge Oral every 4 hours PRN Sore Throat      LABS:                        7.5    30.86 )-----------( 486      ( 2018 11:37 )             22.3         140  |  101  |  73<H>  ----------------------------<  145<H>  4.7   |  21<L>  |  1.54<H>    Ca    9.3      2018 07:45  Mg     1.3               Arterial Blood Gas:   @ 16:00  7.40/30/58/20/88.0/-5.7  ABG lactate: --        MICROBIOLOGY:     RADIOLOGY:  [X] Reviewed and interpreted by me:  CT chest from today:  Diffuse multifocal opacities c/w pneumonia        EKG:

## 2018-02-24 NOTE — CONSULT NOTE ADULT - SUBJECTIVE AND OBJECTIVE BOX
KIM ROJAS  MRN-[id number]    Patient is a 84y old  Female who presents with a chief complaint of urinary retention and shortness of breath (15 Feb 2018 12:03)    HPI:  85 y/o F with h/o COPD on 3L of O2 at home, HTN, CHF, DM, HLD, diverticulosis, breast ca s/p lumpectomy and RT, presented to the ED with urinary retention X 5 days and shortness of breath. Pt hadbeen dribbling and had been going to urinate more often. Pt also had shortness of breath with exertion. Pt  had to decrease her lasix dose due to worsening renal function. Pt denied LOC, syncope,fever chillls, N/V/D/C, numbness, tingling, chest pain, palpitations, lightheadness, dizziness, dysuria, urinary/bowel incontinence or any other complaints at presentation  Patient now with worsening hypoxia. Ct scans showing worsening infiltrated. No HA or dizziness. No cp or palp. + sob. Wearing o2 NRB. + cough. No N/V. + loose stools    PAST MEDICAL & SURGICAL HISTORY:  Congestive heart failure (CHF)  Pulmonary hypertension  Emphysema of lung  Femoral Artery Thrombosis: partiac oclusion with no inteventions as per Pt on ASA Plavix  History of Colonoscopy with Polypectomy  History of Hysterectomy  Diverticulosis of Colon  Breast Cancer: S/P Lt lumpectomy &amp; RT  Dyslipidemia  DM (Diabetes Mellitus)  HTN (Hypertension)  COPD (Chronic Obstructive Pulmonary Disease)  History of tonsillectomy  S/P lumpectomy, left breast: 1998  H/O total hysterectomy: 1999    Current Meds  MEDICATIONS  (STANDING):  amLODIPine   Tablet 10 milliGRAM(s) Oral daily  aspirin 325 milliGRAM(s) Oral daily  atorvastatin 20 milliGRAM(s) Oral at bedtime  azithromycin  IVPB      aztreonam  IVPB 1000 milliGRAM(s) IV Intermittent every 12 hours  buDESOnide 160 MICROgram(s)/formoterol 4.5 MICROgram(s) Inhaler 2 Puff(s) Inhalation two times a day  cholecalciferol 1000 Unit(s) Oral daily  cinacalcet 30 milliGRAM(s) Oral <User Schedule>  ferrous    sulfate 325 milliGRAM(s) Oral daily  furosemide    Tablet 20 milliGRAM(s) Oral every 48 hours  heparin  Injectable 5000 Unit(s) SubCutaneous every 12 hours  hydrALAZINE 75 milliGRAM(s) Oral three times a day  isosorbide   mononitrate ER Tablet (IMDUR) 60 milliGRAM(s) Oral daily  lactobacillus acidophilus 1 Tablet(s) Oral daily  loratadine 10 milliGRAM(s) Oral daily  melatonin 3 milliGRAM(s) Oral at bedtime  metoprolol     tartrate 100 milliGRAM(s) Oral two times a day  micafungin IVPB      multivitamin 1 Tablet(s) Oral daily  pantoprazole    Tablet 40 milliGRAM(s) Oral before breakfast  sertraline 100 milliGRAM(s) Oral daily  simethicone 160 milliGRAM(s) Chew every 12 hours  sodium chloride 3%  Inhalation 4 milliLiter(s) Inhalation four times a day  vancomycin  IVPB 750 milliGRAM(s) IV Intermittent every 24 hours    MEDICATIONS  (PRN):  acetaminophen   Tablet. 650 milliGRAM(s) Oral every 6 hours PRN Mild, moderate, or severe, or temp >99.0F, or at discretion of provider  ALBUTerol    90 MICROgram(s) HFA Inhaler 2 Puff(s) Inhalation every 6 hours PRN Bronchospasm  aluminum hydroxide/magnesium hydroxide/simethicone Suspension 30 milliLiter(s) Oral every 6 hours PRN Dyspepsia  benzocaine 15 mG/menthol 3.6 mG Lozenge 1 Lozenge Oral every 4 hours PRN Sore Throat    Allergies    lisinopril (Anaphylaxis)  penicillin (Anaphylaxis)      Social History  No tob.  No etoh    Family History  No pertinent family history in first degree relatives      Review of System      General:	Denies fevers, chills, sweats. + fatigue    Skin/Breast: denies pruritis, rash  	  Ophthalmologic: no change in vision or blurring  	  HEENT	Denies dry mouth, oral sores, dysphagia,  change in hearing.    Respiratory and Thorax: + cough and sob  	  Cardiovascular:	no cp , palp, orthopnea    Gastrointestinal:	no n/v + loose stools    Genitourinary:	no dysuria of frequency, no hematuria, no flank pain    Musculoskeletal:	no bone or joint pain. no muscle aches.     Neurological:	no change in sensory or motor function. no headache. no weakness.     Psychiatric:	no depression, no anxiety, insomnia.     Hematology/Lymphatics:	no bleeding or bruising      Vitals  Vital Signs Last 24 Hrs  T(C): 36.4 (24 Feb 2018 21:15), Max: 36.7 (23 Feb 2018 21:57)  T(F): 97.5 (24 Feb 2018 21:15), Max: 98.1 (24 Feb 2018 05:55)  HR: 74 (24 Feb 2018 21:43) (63 - 80)  BP: 155/54 (24 Feb 2018 21:15) (126/41 - 190/64)  BP(mean): 79 (24 Feb 2018 17:29) (79 - 153)  RR: 30 (24 Feb 2018 21:15) (18 - 36)  SpO2: 97% (24 Feb 2018 21:43) (83% - 100%)    Physical Exam    Constitutional: NAD    Eyes: EOMI, anicteric sclera    Heent :No oral sores, no pharyngeal injection. moist mucosa.    Neck: supple, no jvd, no LAD    Respiratory: + crackles. Decreased BS    Cardiovascular: s1s2, no m/g/r    Gastrointestinal: soft, nt, nd, + BS    Extremities: no c/c/e    Neurological:A&O x 3 moves all ext.    Skin: no rash on exposed skin    Lymph Nodes: no lymphadenopathy.    Lab  CBC Full  -  ( 24 Feb 2018 11:37 )  WBC Count : 30.86 K/uL  Hemoglobin : 7.5 g/dL  Hematocrit : 22.3 %  Platelet Count - Automated : 486 K/uL  Mean Cell Volume : 94.9 fL  Mean Cell Hemoglobin : 31.9 pg  Mean Cell Hemoglobin Concentration : 33.6 %  Auto Neutrophil # : x  Auto Lymphocyte # : x  Auto Monocyte # : x  Auto Eosinophil # : x  Auto Basophil # : x  Auto Neutrophil % : x  Auto Lymphocyte % : x  Auto Monocyte % : x  Auto Eosinophil % : x  Auto Basophil % : x    02-24    140  |  101  |  73<H>  ----------------------------<  145<H>  4.7   |  21<L>  |  1.54<H>    Ca    9.3      24 Feb 2018 07:45  Mg     1.3     02-24          Rad:  CT results noted.

## 2018-02-24 NOTE — PROGRESS NOTE ADULT - PROBLEM SELECTOR PLAN 1
Patient with PAM likely hemodynamically mediated due to diuretic therapy in setting of ARB use and infection. Scr improving. Continue to hold losartan and can restart once renal function improves. Avoid nephrotoxins.

## 2018-02-24 NOTE — PROGRESS NOTE ADULT - ASSESSMENT
85 y/o F with h/o COPD on 3L of O2 at home, HTN, DCHF, DM, HLD, diverticulosis, breast ca s/p lumpectomy and RT, presents  with  urinary retention  and shortness of breath    1. Pna/  Leukocytosis/Dyspnea  on IV abx  more dyspnea, tachypnea today   wbc increasing on broad spectrum abx  xray - left sided infiltrate appears worse with increased pulmonary edema  Iv lasix x1 now  ct chest non contrast to better evaluate parenchyma  r/o mass, r/o worsening filtrate  pulmo f/u   ? need for bronch  recheck RVP  ID/ pulm f/u   check bcx  r/o endocarditis    2. acute on chronic DCHF  iv lasix today for xray findings and inc dypsnea  echo with lv intracav gradient without sig AS, normal LV sys fx   c.o of  chest pressure / dyspnea likely related to pulm infection/process  no evidence of ACS   continue standing lasix 20 mg PO q 48 hrs with add prn  monitor creat levels   continue with BB     3.  Mild AS/  Moderate aortic regurgitation.   echo with lv intracav gradient without sig AS  continue with diuretics  continue with metoprolol      4. HTN  acceptable   cont hydralazine, norvasc, metoprolol , imdur as ordered     5. PAM   renal f.u   stable creat     6.   COPD   s/p po steroids   repeat CT chest    7. anemia   prbc x 1 today in setting of multiple medical issues and severe anemia   lasix 40 iv  x1 post prbc  heme eval called    8. AF  cont rate control   no a/c due to anemia   poss blood loss  asa only       d/w son via phone yesterday   if clinically worsens will call micu eval

## 2018-02-24 NOTE — PROGRESS NOTE ADULT - ASSESSMENT
85 y/o F with h/o COPD on 3L of O2 at home, HTN, CHF, DM, HLD, diverticulosis, breast ca s/p lumpectomy and RT, presents to the ED for urinary retention X 5 days and shortness of breath.  UA and urine cultures negative.  Pt with new BABS infiltrate.    Problem/Plan - 1:    ·	HCAP    - New BABS infiltrate.  Agree with broad spectrum abx.  Start vanco/azactam (pt develops SOB with PCN).  Pt without current fever, and denies purulent cough however.  Repeat chest xray on 2/23 and 2/24 with increased infiltrate on BABS.      - Order repeat CT chest to evalute BABS infiltrate.  ?atypical organism - is not responding to current antibiotics.  Will start micafungin.  Order aspergillus galactomannin, Fungitell, induced sputum for cultures and AFB, cryp Ag.    - ?Bronchoscopy    - Check blood cultures x 2    Will follow,    Tish Almanza  [phone] 85 y/o F with h/o COPD on 3L of O2 at home, HTN, CHF, DM, HLD, diverticulosis, breast ca s/p lumpectomy and RT, presents to the ED for urinary retention X 5 days and shortness of breath.  UA and urine cultures negative.  Pt with new BABS infiltrate.    Problem/Plan - 1:    ·	HCAP    - New BABS infiltrate.  Agree with broad spectrum abx.  Start vanco/azactam (pt develops SOB with PCN).  Pt without current fever, and denies purulent cough however.  Repeat chest xray on 2/23 and 2/24 with increased infiltrate on BABS.      - Order repeat CT chest to evalute BABS infiltrate.  ?atypical organism - is not responding to current antibiotics.  Will start micafungin.  Order aspergillus galactomannin, Fungitell, induced sputum for cultures and AFB, cryp Ag.    - ?Bronchoscopy vs. PE - Pulmonary Dr. Justice pageemma ([phone]) awaiting callback    - Check blood cultures x 2    - Will add antifungal coverage with Micafungin and dose of gentamicin for additional gram negative coverage    Will follow,    Tish Almanza  [phone]

## 2018-02-25 VITALS
DIASTOLIC BLOOD PRESSURE: 48 MMHG | RESPIRATION RATE: 38 BRPM | SYSTOLIC BLOOD PRESSURE: 155 MMHG | HEART RATE: 71 BPM | TEMPERATURE: 98 F | OXYGEN SATURATION: 90 %

## 2018-02-25 DIAGNOSIS — J18.9 PNEUMONIA, UNSPECIFIED ORGANISM: ICD-10-CM

## 2018-02-25 DIAGNOSIS — J96.01 ACUTE RESPIRATORY FAILURE WITH HYPOXIA: ICD-10-CM

## 2018-02-25 LAB
ALBUMIN SERPL ELPH-MCNC: 2.5 G/DL — LOW (ref 3.3–5)
ALP SERPL-CCNC: 224 U/L — HIGH (ref 40–120)
ALT FLD-CCNC: 212 U/L — HIGH (ref 4–33)
APTT BLD: 45.7 SEC — HIGH (ref 27.5–37.4)
AST SERPL-CCNC: 397 U/L — HIGH (ref 4–32)
BASE EXCESS BLDA CALC-SCNC: -1 MMOL/L — SIGNIFICANT CHANGE UP
BASE EXCESS BLDA CALC-SCNC: -7.6 MMOL/L — SIGNIFICANT CHANGE UP
BILIRUB SERPL-MCNC: 0.6 MG/DL — SIGNIFICANT CHANGE UP (ref 0.2–1.2)
BLD GP AB SCN SERPL QL: NEGATIVE — SIGNIFICANT CHANGE UP
BUN SERPL-MCNC: 78 MG/DL — HIGH (ref 7–23)
BUN SERPL-MCNC: 85 MG/DL — HIGH (ref 7–23)
CALCIUM SERPL-MCNC: 11.7 MG/DL — HIGH (ref 8.4–10.5)
CALCIUM SERPL-MCNC: 9.5 MG/DL — SIGNIFICANT CHANGE UP (ref 8.4–10.5)
CHLORIDE BLDA-SCNC: 108 MMOL/L — SIGNIFICANT CHANGE UP (ref 96–108)
CHLORIDE SERPL-SCNC: 102 MMOL/L — SIGNIFICANT CHANGE UP (ref 98–107)
CHLORIDE SERPL-SCNC: 102 MMOL/L — SIGNIFICANT CHANGE UP (ref 98–107)
CK MB BLD-MCNC: 1.96 NG/ML — SIGNIFICANT CHANGE UP (ref 1–4.7)
CK MB BLD-MCNC: SIGNIFICANT CHANGE UP (ref 0–2.5)
CK SERPL-CCNC: 74 U/L — SIGNIFICANT CHANGE UP (ref 25–170)
CO2 SERPL-SCNC: 17 MMOL/L — LOW (ref 22–31)
CO2 SERPL-SCNC: 24 MMOL/L — SIGNIFICANT CHANGE UP (ref 22–31)
CREAT SERPL-MCNC: 1.73 MG/DL — HIGH (ref 0.5–1.3)
CREAT SERPL-MCNC: 2.13 MG/DL — HIGH (ref 0.5–1.3)
CRYPTOC AG FLD QL: NEGATIVE — SIGNIFICANT CHANGE UP
GLUCOSE BLDA-MCNC: 311 MG/DL — HIGH (ref 70–99)
GLUCOSE SERPL-MCNC: 141 MG/DL — HIGH (ref 70–99)
GLUCOSE SERPL-MCNC: 329 MG/DL — HIGH (ref 70–99)
HCO3 BLDA-SCNC: 17 MMOL/L — LOW (ref 22–26)
HCO3 BLDA-SCNC: 24 MMOL/L — SIGNIFICANT CHANGE UP (ref 22–26)
HCT VFR BLD CALC: 23.2 % — LOW (ref 34.5–45)
HCT VFR BLD CALC: 23.6 % — LOW (ref 34.5–45)
HCT VFR BLDA CALC: 22.3 % — LOW (ref 34.5–46.5)
HGB BLD-MCNC: 7.4 G/DL — LOW (ref 11.5–15.5)
HGB BLD-MCNC: 8 G/DL — LOW (ref 11.5–15.5)
HGB BLDA-MCNC: 7.1 G/DL — LOW (ref 11.5–15.5)
INR BLD: 1.23 — HIGH (ref 0.88–1.17)
L PNEUMO AG UR QL: NEGATIVE — SIGNIFICANT CHANGE UP
LACTATE BLDA-SCNC: 8.3 MMOL/L — CRITICAL HIGH (ref 0.5–2)
LACTATE BLDA-SCNC: 8.3 MMOL/L — CRITICAL HIGH (ref 0.5–2)
MAGNESIUM SERPL-MCNC: 1.8 MG/DL — SIGNIFICANT CHANGE UP (ref 1.6–2.6)
MAGNESIUM SERPL-MCNC: 2.9 MG/DL — HIGH (ref 1.6–2.6)
MCHC RBC-ENTMCNC: 31.1 PG — SIGNIFICANT CHANGE UP (ref 27–34)
MCHC RBC-ENTMCNC: 31.3 PG — SIGNIFICANT CHANGE UP (ref 27–34)
MCHC RBC-ENTMCNC: 31.9 % — LOW (ref 32–36)
MCHC RBC-ENTMCNC: 33.9 % — SIGNIFICANT CHANGE UP (ref 32–36)
MCV RBC AUTO: 92.2 FL — SIGNIFICANT CHANGE UP (ref 80–100)
MCV RBC AUTO: 97.5 FL — SIGNIFICANT CHANGE UP (ref 80–100)
NRBC # FLD: 0.3 — SIGNIFICANT CHANGE UP
NRBC # FLD: 0.7 — SIGNIFICANT CHANGE UP
NRBC FLD-RTO: 1 — SIGNIFICANT CHANGE UP
NRBC FLD-RTO: 2 — SIGNIFICANT CHANGE UP
PCO2 BLDA: 33 MMHG — SIGNIFICANT CHANGE UP (ref 32–48)
PCO2 BLDA: 70 MMHG — CRITICAL HIGH (ref 32–48)
PH BLDA: 7.1 PH — CRITICAL LOW (ref 7.35–7.45)
PH BLDA: 7.45 PH — SIGNIFICANT CHANGE UP (ref 7.35–7.45)
PHOSPHATE SERPL-MCNC: 3.8 MG/DL — SIGNIFICANT CHANGE UP (ref 2.5–4.5)
PLATELET # BLD AUTO: 164 K/UL — SIGNIFICANT CHANGE UP (ref 150–400)
PLATELET # BLD AUTO: 454 K/UL — HIGH (ref 150–400)
PMV BLD: 10.7 FL — SIGNIFICANT CHANGE UP (ref 7–13)
PMV BLD: 11.9 FL — SIGNIFICANT CHANGE UP (ref 7–13)
PO2 BLDA: 28 MMHG — CRITICAL LOW (ref 83–108)
PO2 BLDA: 70 MMHG — LOW (ref 83–108)
POTASSIUM BLDA-SCNC: 5.5 MMOL/L — HIGH (ref 3.4–4.5)
POTASSIUM SERPL-MCNC: 4.8 MMOL/L — SIGNIFICANT CHANGE UP (ref 3.5–5.3)
POTASSIUM SERPL-MCNC: SIGNIFICANT CHANGE UP MMOL/L (ref 3.5–5.3)
POTASSIUM SERPL-SCNC: 4.8 MMOL/L — SIGNIFICANT CHANGE UP (ref 3.5–5.3)
POTASSIUM SERPL-SCNC: SIGNIFICANT CHANGE UP MMOL/L (ref 3.5–5.3)
PROT SERPL-MCNC: 5.6 G/DL — LOW (ref 6–8.3)
PROTHROM AB SERPL-ACNC: 14.2 SEC — HIGH (ref 9.8–13.1)
RBC # BLD: 2.38 M/UL — LOW (ref 3.8–5.2)
RBC # BLD: 2.56 M/UL — LOW (ref 3.8–5.2)
RBC # FLD: 16 % — HIGH (ref 10.3–14.5)
RBC # FLD: 16.8 % — HIGH (ref 10.3–14.5)
RH IG SCN BLD-IMP: POSITIVE — SIGNIFICANT CHANGE UP
SAO2 % BLDA: 27.1 % — LOW (ref 95–99)
SAO2 % BLDA: 95.3 % — SIGNIFICANT CHANGE UP (ref 95–99)
SODIUM BLDA-SCNC: 137 MMOL/L — SIGNIFICANT CHANGE UP (ref 136–146)
SODIUM SERPL-SCNC: 142 MMOL/L — SIGNIFICANT CHANGE UP (ref 135–145)
SODIUM SERPL-SCNC: 143 MMOL/L — SIGNIFICANT CHANGE UP (ref 135–145)
SPECIMEN SOURCE: SIGNIFICANT CHANGE UP
TROPONIN T SERPL-MCNC: < 0.06 NG/ML — SIGNIFICANT CHANGE UP (ref 0–0.06)
WBC # BLD: 30.21 K/UL — HIGH (ref 3.8–10.5)
WBC # BLD: 35.05 K/UL — HIGH (ref 3.8–10.5)
WBC # FLD AUTO: 30.21 K/UL — HIGH (ref 3.8–10.5)
WBC # FLD AUTO: 35.05 K/UL — HIGH (ref 3.8–10.5)

## 2018-02-25 PROCEDURE — 93010 ELECTROCARDIOGRAM REPORT: CPT

## 2018-02-25 PROCEDURE — 71045 X-RAY EXAM CHEST 1 VIEW: CPT | Mod: 26,76

## 2018-02-25 RX ORDER — TIGECYCLINE 50 MG/5ML
50 INJECTION, POWDER, LYOPHILIZED, FOR SOLUTION INTRAVENOUS EVERY 12 HOURS
Qty: 0 | Refills: 0 | Status: DISCONTINUED | OUTPATIENT
Start: 2018-02-25 | End: 2018-02-25

## 2018-02-25 RX ORDER — TIGECYCLINE 50 MG/5ML
INJECTION, POWDER, LYOPHILIZED, FOR SOLUTION INTRAVENOUS
Qty: 0 | Refills: 0 | Status: DISCONTINUED | OUTPATIENT
Start: 2018-02-25 | End: 2018-02-25

## 2018-02-25 RX ORDER — TIGECYCLINE 50 MG/5ML
100 INJECTION, POWDER, LYOPHILIZED, FOR SOLUTION INTRAVENOUS ONCE
Qty: 0 | Refills: 0 | Status: COMPLETED | OUTPATIENT
Start: 2018-02-25 | End: 2018-02-25

## 2018-02-25 RX ORDER — MAGNESIUM SULFATE 500 MG/ML
1 VIAL (ML) INJECTION ONCE
Qty: 0 | Refills: 0 | Status: DISCONTINUED | OUTPATIENT
Start: 2018-02-25 | End: 2018-02-25

## 2018-02-25 RX ADMIN — ISOSORBIDE MONONITRATE 60 MILLIGRAM(S): 60 TABLET, EXTENDED RELEASE ORAL at 11:36

## 2018-02-25 RX ADMIN — Medication 100 MILLIGRAM(S): at 05:14

## 2018-02-25 RX ADMIN — AMLODIPINE BESYLATE 10 MILLIGRAM(S): 2.5 TABLET ORAL at 05:14

## 2018-02-25 RX ADMIN — AZITHROMYCIN 250 MILLIGRAM(S): 500 TABLET, FILM COATED ORAL at 16:30

## 2018-02-25 RX ADMIN — Medication 325 MILLIGRAM(S): at 11:37

## 2018-02-25 RX ADMIN — SODIUM CHLORIDE 4 MILLILITER(S): 9 INJECTION INTRAMUSCULAR; INTRAVENOUS; SUBCUTANEOUS at 04:05

## 2018-02-25 RX ADMIN — Medication 1 TABLET(S): at 11:36

## 2018-02-25 RX ADMIN — BUDESONIDE AND FORMOTEROL FUMARATE DIHYDRATE 2 PUFF(S): 160; 4.5 AEROSOL RESPIRATORY (INHALATION) at 09:02

## 2018-02-25 RX ADMIN — MICAFUNGIN SODIUM 105 MILLIGRAM(S): 100 INJECTION, POWDER, LYOPHILIZED, FOR SOLUTION INTRAVENOUS at 13:12

## 2018-02-25 RX ADMIN — LORATADINE 10 MILLIGRAM(S): 10 TABLET ORAL at 11:36

## 2018-02-25 RX ADMIN — HEPARIN SODIUM 5000 UNIT(S): 5000 INJECTION INTRAVENOUS; SUBCUTANEOUS at 17:13

## 2018-02-25 RX ADMIN — HEPARIN SODIUM 5000 UNIT(S): 5000 INJECTION INTRAVENOUS; SUBCUTANEOUS at 05:15

## 2018-02-25 RX ADMIN — PANTOPRAZOLE SODIUM 40 MILLIGRAM(S): 20 TABLET, DELAYED RELEASE ORAL at 05:14

## 2018-02-25 RX ADMIN — Medication 75 MILLIGRAM(S): at 05:15

## 2018-02-25 RX ADMIN — Medication 325 MILLIGRAM(S): at 11:36

## 2018-02-25 RX ADMIN — Medication 75 MILLIGRAM(S): at 13:51

## 2018-02-25 RX ADMIN — SIMETHICONE 160 MILLIGRAM(S): 80 TABLET, CHEWABLE ORAL at 05:14

## 2018-02-25 RX ADMIN — CINACALCET 30 MILLIGRAM(S): 30 TABLET, FILM COATED ORAL at 09:02

## 2018-02-25 RX ADMIN — SERTRALINE 100 MILLIGRAM(S): 25 TABLET, FILM COATED ORAL at 11:36

## 2018-02-25 RX ADMIN — Medication 50 MILLIGRAM(S): at 05:15

## 2018-02-25 RX ADMIN — Medication 1000 UNIT(S): at 11:36

## 2018-02-25 RX ADMIN — TIGECYCLINE 110 MILLIGRAM(S): 50 INJECTION, POWDER, LYOPHILIZED, FOR SOLUTION INTRAVENOUS at 10:22

## 2018-02-25 NOTE — DISCHARGE NOTE FOR THE EXPIRED PATIENT - SECONDARY DIAGNOSIS.
Acute kidney injury Congestive heart failure (CHF) COPD (Chronic Obstructive Pulmonary Disease) Acute respiratory failure with hypoxia DM (diabetes mellitus) Dyslipidemia Essential hypertension Multifocal pneumonia Pneumothorax

## 2018-02-25 NOTE — PROGRESS NOTE ADULT - ATTENDING COMMENTS
[facility] NEPHROLOGY  Elliott Beltran M.D.  Marco Antonio Fernando D.O.  Tracie Mcfadden M.D.  Britney Marie, MSN, ANP-C    Telephone: [phone]  Facsimile: [phone]    [address] [place] [address]

## 2018-02-25 NOTE — PROGRESS NOTE ADULT - PROBLEM SELECTOR PLAN 7
out patient follow up with repeat CT. in 4-6 months.
DVT prophylaxis
DVT prophylaxis  2/17 Heparin SQ
DVT prophylaxis  2/17 Heparin SQ  2/18 Heparin SQ
DVT prophylaxis  2/17 Heparin SQ  2/18 Heparin SQ
DVT prophylaxis  Hep SQ

## 2018-02-25 NOTE — PROGRESS NOTE ADULT - PROBLEM SELECTOR PROBLEM 3
Nephrogenous proteinuria
Nephrogenous proteinuria
Abnormal chest CT
Congestive heart failure (CHF)
Leukocytosis, unspecified type
Nephrogenous proteinuria
Urinary retention
Nephrogenous proteinuria
Leukocytosis, unspecified type
Leukocytosis, unspecified type

## 2018-02-25 NOTE — PROGRESS NOTE ADULT - ASSESSMENT
85 y/o woman with heart disease, PAM. Worsening Hypoxia with abnormal chest CT. Pulmonary and ID following. Now on micafungin as well as ABX.    1) Leukocytosis - significant rise as inpatient is likely related to a reactive response. Unlikley to be a Myeloproliferative disease process. Would monitor WBC for now. Will  order an abdominal sono , once patient more stable.  2) Thrombocytosis- also likely reactive. Patient is on ASA.  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   - monitor h/h  -If hgb declines furher would consider transfusional support.  check FOBT 83 y/o woman with heart disease, PAM. Worsening Hypoxia with abnormal chest CT. Pulmonary and ID following. Now on micafungin as well as ABX.    1) Leukocytosis - significant rise as inpatient is likely related to a reactive response. Unlikley to be a Myeloproliferative disease process. Would monitor WBC for now. Will  order an abdominal sono , once patient more stable.  2) Thrombocytosis- also likely reactive. Patient is on ASA.  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   - monitor h/h  -If hgb declines furher would consider transfusional support.  prior FOBT negative

## 2018-02-25 NOTE — PROGRESS NOTE ADULT - PROBLEM SELECTOR PROBLEM 4
Essential hypertension
COPD (Chronic Obstructive Pulmonary Disease)
Dyslipidemia
Essential hypertension
Lung nodule
Urinary retention
COPD (Chronic Obstructive Pulmonary Disease)
Congestive heart failure (CHF)

## 2018-02-25 NOTE — PROGRESS NOTE ADULT - SUBJECTIVE AND OBJECTIVE BOX
CARDIOLOGY FOLLOW UP - Dr. Holliday    CC c.o chest pressure   no sob no abd pain       PHYSICAL EXAM:  T(C): 36.6 (02-21-18 @ 05:48), Max: 37.6 (02-20-18 @ 22:11)  HR: 63 (02-21-18 @ 05:48) (56 - 68)  BP: 153/50 (02-21-18 @ 05:48) (153/50 - 185/49)  RR: 18 (02-21-18 @ 05:48) (18 - 18)  SpO2: 98% (02-21-18 @ 05:48) (96% - 98%)  Wt(kg): --  I&O's Summary    20 Feb 2018 07:01  -  21 Feb 2018 07:00  --------------------------------------------------------  IN: 320 mL / OUT: 930 mL / NET: -610 mL        Appearance: Normal	  Cardiovascular: Normal S1 S2, irregular, + sys murmur   Respiratory: diminished with fine crackles at base   Gastrointestinal:  Soft, Non-tender, + BS	  Extremities: Normal range of motion, No clubbing, cyanosis or edema        MEDICATIONS  (STANDING):  amLODIPine   Tablet 10 milliGRAM(s) Oral daily  atorvastatin 20 milliGRAM(s) Oral at bedtime  buDESOnide 160 MICROgram(s)/formoterol 4.5 MICROgram(s) Inhaler 2 Puff(s) Inhalation two times a day  cholecalciferol 1000 Unit(s) Oral daily  cinacalcet 30 milliGRAM(s) Oral <User Schedule>  ferrous    sulfate 325 milliGRAM(s) Oral daily  heparin  Injectable 5000 Unit(s) SubCutaneous every 12 hours  hydrALAZINE 75 milliGRAM(s) Oral three times a day  isosorbide   mononitrate ER Tablet (IMDUR) 30 milliGRAM(s) Oral daily  loratadine 10 milliGRAM(s) Oral daily  melatonin 3 milliGRAM(s) Oral at bedtime  metoprolol     tartrate 100 milliGRAM(s) Oral two times a day  multivitamin 1 Tablet(s) Oral daily  pantoprazole    Tablet 40 milliGRAM(s) Oral before breakfast  sertraline 100 milliGRAM(s) Oral daily      TELEMETRY: afib 	    ECG:  Afib HR 63   LVH, left ant fasicular block   q waves septal leads ( unchanged)   peaked T waves  v 2, v3 	  RADIOLOGY:   DIAGNOSTIC TESTING:  [ ] Echocardiogram:  [ ]  Catheterization:  [ ] Stress Test:    OTHER: 	  < from: Xray Chest 1 View- PORTABLE-Routine (02.20.18 @ 08:20) >    IMPRESSION:  Accentuation and indistinctness of pulmonary vascular   markings, greater on the left, as well as some left airspace opacity,   findings which may be attributable to asymmetric pulmonary edema. Limited   comparison with prior CT scan due to differing technique however   left-sided findings may be slightly increased. Superimposed infection,   particularly on the left, is not excluded.    Small left pleural effusion.      < end of copied text >    LABS:	 	                                7.7    22.22 )-----------( 381      ( 21 Feb 2018 06:55 )             23.1     02-21    140  |  103  |  60<H>  ----------------------------<  154<H>  4.5   |  20<L>  |  1.77<H>    Ca    9.6      21 Feb 2018 06:55  Phos  3.1     02-21  Mg     1.6     02-21
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 as per Pt on ASA Plavix  History of Colonoscopy with Polypectomy  History of Hysterectomy  Diverticulosis of Colon  Breast Cancer: S/P Lt lumpectomy &amp; RT  Dyslipidemia  DM (Diabetes Mellitus)  HTN (Hypertension)  COPD (Chronic Obstructive Pulmonary Disease)  History of tonsillectomy  S/P lumpectomy, left breast: 1998  H/O total hysterectomy: 1999      FAMILY HISTORY:  No pertinent family history in first degree relatives      REVIEW OF SYSTEMS:  CONSTITUTIONAL: No fever, weight loss, or fatigue  EYES: No eye pain, visual disturbances, or discharge  NECK: No pain or stiffness  RESPIRATORY: No cough or wheezing, no shortness of breath  CARDIOVASCULAR: No chest pain, + palpitations, no dizziness, or leg swelling  GASTROINTESTINAL: No abdominal or epigastric pain. No nausea, vomiting, diarrhea or constipation  GENITOURINARY: No dysuria, urinary frequency or urgency, no hematuria  NEUROLOGICAL: No headaches, memory loss, loss of strength, numbness, or tremors  SKIN: No itching, burning, rashes, or lesions   MUSCULOSKELETAL: No joint pain or swelling; No muscle, back, or extremity pain      Medications:  MEDICATIONS  (STANDING):  amLODIPine   Tablet 10 milliGRAM(s) Oral daily  aspirin enteric coated 81 milliGRAM(s) Oral daily  atorvastatin 20 milliGRAM(s) Oral at bedtime  buDESOnide 160 MICROgram(s)/formoterol 4.5 MICROgram(s) Inhaler 2 Puff(s) Inhalation two times a day  cholecalciferol 1000 Unit(s) Oral daily  cinacalcet 30 milliGRAM(s) Oral <User Schedule>  clopidogrel Tablet 75 milliGRAM(s) Oral daily  ferrous    sulfate 325 milliGRAM(s) Oral daily  furosemide    Tablet 40 milliGRAM(s) Oral daily  heparin  Injectable 5000 Unit(s) SubCutaneous every 12 hours  hydrALAZINE 25 milliGRAM(s) Oral four times a day  loratadine 10 milliGRAM(s) Oral daily  losartan 50 milliGRAM(s) Oral daily  melatonin 3 milliGRAM(s) Oral at bedtime  metoprolol     tartrate 75 milliGRAM(s) Oral two times a day  multivitamin 1 Tablet(s) Oral daily  pantoprazole    Tablet 40 milliGRAM(s) Oral before breakfast  predniSONE   Tablet 40 milliGRAM(s) Oral daily  sertraline 100 milliGRAM(s) Oral daily    MEDICATIONS  (PRN):  acetaminophen   Tablet. 650 milliGRAM(s) Oral every 6 hours PRN mild or Moderate Pain (4 - 6)  ALBUTerol    90 MICROgram(s) HFA Inhaler 2 Puff(s) Inhalation every 6 hours PRN Bronchospasm  benzocaine 15 mG/menthol 3.6 mG Lozenge 1 Lozenge Oral every 4 hours PRN Sore Throat    	    PHYSICAL EXAM:  T(C): 36.8 (02-16-18 @ 08:57), Max: 37.1 (02-15-18 @ 23:14)  HR: 61 (02-16-18 @ 08:57) (61 - 70)  BP: 143/47 (02-16-18 @ 08:57) (138/48 - 168/64)  RR: 17 (02-16-18 @ 08:57) (16 - 18)  SpO2: 97% (02-16-18 @ 08:57) (96% - 100%)  Wt(kg): --  I&O's Summary    15 Feb 2018 07:01  -  16 Feb 2018 07:00  --------------------------------------------------------  IN: 1070 mL / OUT: 450 mL / NET: 620 mL      Appearance: Normal	  HEENT:   NCAT, PERRL, EOMI	  Lymphatic: No lymphadenopathy  Cardiovascular: Normal S1 S2, RRR  Respiratory: Lungs clear to auscultation BL  Psychiatry: A & O x 3, Mood & affect appropriate  Gastrointestinal:  Soft, Non-tender, + BS  Skin: No rashes, No ecchymoses, No cyanosis	  Neurologic: Non-focal  Extremities: Normal range of motion, No clubbing, cyanosis or edema    LABS:	 	    CARDIAC MARKERS:                                8.3    9.91  )-----------( 358      ( 16 Feb 2018 06:30 )             24.6     02-16    139  |  102  |  41<H>  ----------------------------<  134<H>  4.1   |  21<L>  |  1.53<H>    Ca    9.1      16 Feb 2018 06:30  Mg     1.9     02-16      proBNP:   Lipid Profile:   HgA1c:   TSH:
Chief complaint: SOB  Interval hx: no new events    Allergies:  lisinopril (Anaphylaxis)  penicillin (Anaphylaxis)      PAST MEDICAL & SURGICAL HISTORY:  Congestive heart failure (CHF)  Pulmonary hypertension  Emphysema of lung  Femoral Artery Thrombosis: partiac oclusion with no inteventions as per Pt on ASA Plavix  History of Colonoscopy with Polypectomy  History of Hysterectomy  Diverticulosis of Colon  Breast Cancer: S/P Lt lumpectomy &amp; RT  Dyslipidemia  DM (Diabetes Mellitus)  HTN (Hypertension)  COPD (Chronic Obstructive Pulmonary Disease)  History of tonsillectomy  S/P lumpectomy, left breast: 1998  H/O total hysterectomy: 1999      FAMILY HISTORY:  No pertinent family history in first degree relatives      REVIEW OF SYSTEMS:  CONSTITUTIONAL: No fever, weight loss, or fatigue  EYES: No eye pain, visual disturbances, or discharge  NECK: No pain or stiffness  RESPIRATORY: No cough or wheezing, no shortness of breath  CARDIOVASCULAR: No chest pain, no palpitations, no dizziness, or leg swelling  GASTROINTESTINAL: No abdominal or epigastric pain. No nausea, vomiting, diarrhea or constipation  GENITOURINARY: No dysuria, urinary frequency or urgency, no hematuria  NEUROLOGICAL: No headaches, memory loss, loss of strength, numbness, or tremors  SKIN: No itching, burning, rashes, or lesions   MUSCULOSKELETAL: No joint pain or swelling; No muscle, back, or extremity pain      Medications:  MEDICATIONS  (STANDING):  amLODIPine   Tablet 10 milliGRAM(s) Oral daily  aspirin 325 milliGRAM(s) Oral daily  atorvastatin 20 milliGRAM(s) Oral at bedtime  aztreonam  IVPB 1000 milliGRAM(s) IV Intermittent every 12 hours  buDESOnide 160 MICROgram(s)/formoterol 4.5 MICROgram(s) Inhaler 2 Puff(s) Inhalation two times a day  cholecalciferol 1000 Unit(s) Oral daily  cinacalcet 30 milliGRAM(s) Oral <User Schedule>  ferrous    sulfate 325 milliGRAM(s) Oral daily  furosemide    Tablet 20 milliGRAM(s) Oral every 48 hours  heparin  Injectable 5000 Unit(s) SubCutaneous every 12 hours  hydrALAZINE 75 milliGRAM(s) Oral three times a day  loratadine 10 milliGRAM(s) Oral daily  melatonin 3 milliGRAM(s) Oral at bedtime  metoprolol     tartrate 100 milliGRAM(s) Oral two times a day  multivitamin 1 Tablet(s) Oral daily  pantoprazole    Tablet 40 milliGRAM(s) Oral before breakfast  sertraline 100 milliGRAM(s) Oral daily  vancomycin  IVPB 750 milliGRAM(s) IV Intermittent every 24 hours    MEDICATIONS  (PRN):  acetaminophen   Tablet. 650 milliGRAM(s) Oral every 6 hours PRN mild or Moderate Pain (4 - 6)  ALBUTerol    90 MICROgram(s) HFA Inhaler 2 Puff(s) Inhalation every 6 hours PRN Bronchospasm  aluminum hydroxide/magnesium hydroxide/simethicone Suspension 30 milliLiter(s) Oral every 6 hours PRN Dyspepsia  benzocaine 15 mG/menthol 3.6 mG Lozenge 1 Lozenge Oral every 4 hours PRN Sore Throat    	    PHYSICAL EXAM:  T(C): 36.8 (02-22-18 @ 13:20), Max: 36.9 (02-22-18 @ 06:37)  HR: 61 (02-22-18 @ 17:29) (61 - 69)  BP: 170/48 (02-22-18 @ 17:29) (144/62 - 170/48)  RR: 17 (02-22-18 @ 13:20) (17 - 18)  SpO2: 96% (02-22-18 @ 13:20) (96% - 98%)  Wt(kg): --  I&O's Summary    21 Feb 2018 07:01  -  22 Feb 2018 07:00  --------------------------------------------------------  IN: 0 mL / OUT: 750 mL / NET: -750 mL    22 Feb 2018 07:01  -  22 Feb 2018 19:53  --------------------------------------------------------  IN: 540 mL / OUT: 300 mL / NET: 240 mL      Appearance: Normal	  HEENT:   NCAT, PERRL, EOMI	  Lymphatic: No lymphadenopathy  Cardiovascular: Normal S1 S2, RRR  Respiratory: Lungs clear to auscultation BL  Psychiatry: A & O x 3, Mood & affect appropriate  Gastrointestinal:  Soft, Non-tender, + BS  Skin: No rashes, No ecchymoses, No cyanosis	  Neurologic: Non-focal  Extremities: Normal range of motion, No clubbing, cyanosis or edema    LABS:	 	    CARDIAC MARKERS:  CARDIAC MARKERS ( 21 Feb 2018 10:30 )  x     / < 0.06 ng/mL / 7 u/L / 1.00 ng/mL / x                                    8.2    22.16 )-----------( 394      ( 21 Feb 2018 10:30 )             24.1     02-21    140  |  103  |  60<H>  ----------------------------<  154<H>  4.5   |  20<L>  |  1.77<H>    Ca    9.6      21 Feb 2018 06:55  Phos  3.1     02-21  Mg     1.6     02-21      proBNP:   Lipid Profile:   HgA1c:   TSH:
Follow-up Pulm Progress Note    yesterdays events reviewed, now with hypoxic respiratory failure on NRB  abx broaden by ID.  CT preformed showed multifocal pna.    this am pt states she is feeling worse, more sob, + cough no phlem no fever.        Admit Diagnosis:  Other form of dyspnea      Past Medical & Surgical History:  Congestive heart failure (CHF)  Pulmonary hypertension  Emphysema of lung  Femoral Artery Thrombosis  History of Colonoscopy with Polypectomy  History of Hysterectomy  Diverticulosis of Colon  Breast Cancer  Dyslipidemia  DM (Diabetes Mellitus)  HTN (Hypertension)  COPD (Chronic Obstructive Pulmonary Disease)  History of tonsillectomy  S/P lumpectomy, left breast  H/O total hysterectomy      Medications:  MEDICATIONS  (STANDING):  amLODIPine   Tablet 10 milliGRAM(s) Oral daily  aspirin 325 milliGRAM(s) Oral daily  atorvastatin 20 milliGRAM(s) Oral at bedtime  azithromycin  IVPB      azithromycin  IVPB 500 milliGRAM(s) IV Intermittent every 24 hours  aztreonam  IVPB 1000 milliGRAM(s) IV Intermittent every 12 hours  buDESOnide 160 MICROgram(s)/formoterol 4.5 MICROgram(s) Inhaler 2 Puff(s) Inhalation two times a day  cholecalciferol 1000 Unit(s) Oral daily  cinacalcet 30 milliGRAM(s) Oral <User Schedule>  ferrous    sulfate 325 milliGRAM(s) Oral daily  furosemide    Tablet 20 milliGRAM(s) Oral every 48 hours  heparin  Injectable 5000 Unit(s) SubCutaneous every 12 hours  hydrALAZINE 75 milliGRAM(s) Oral three times a day  isosorbide   mononitrate ER Tablet (IMDUR) 60 milliGRAM(s) Oral daily  lactobacillus acidophilus 1 Tablet(s) Oral daily  loratadine 10 milliGRAM(s) Oral daily  melatonin 3 milliGRAM(s) Oral at bedtime  metoprolol     tartrate 100 milliGRAM(s) Oral two times a day  micafungin IVPB 100 milliGRAM(s) IV Intermittent every 24 hours  micafungin IVPB      multivitamin 1 Tablet(s) Oral daily  pantoprazole    Tablet 40 milliGRAM(s) Oral before breakfast  sertraline 100 milliGRAM(s) Oral daily  sodium chloride 3%  Inhalation 4 milliLiter(s) Inhalation four times a day  tigecycline IVPB 100 milliGRAM(s) IV Intermittent once  tigecycline IVPB 50 milliGRAM(s) IV Intermittent every 12 hours  tigecycline IVPB        MEDICATIONS  (PRN):  acetaminophen   Tablet. 650 milliGRAM(s) Oral every 6 hours PRN Mild, moderate, or severe, or temp >99.0F, or at discretion of provider  ALBUTerol    90 MICROgram(s) HFA Inhaler 2 Puff(s) Inhalation every 6 hours PRN Bronchospasm  aluminum hydroxide/magnesium hydroxide/simethicone Suspension 30 milliLiter(s) Oral every 6 hours PRN Dyspepsia  benzocaine 15 mG/menthol 3.6 mG Lozenge 1 Lozenge Oral every 4 hours PRN Sore Throat      Vent settings (if applicable)      Vital Signs Last 24 Hrs  T(C): 36.6 (25 Feb 2018 05:05), Max: 36.7 (24 Feb 2018 15:41)  T(F): 97.8 (25 Feb 2018 05:05), Max: 98.1 (24 Feb 2018 15:41)  HR: 82 (25 Feb 2018 05:05) (67 - 82)  BP: 152/42 (25 Feb 2018 05:05) (126/41 - 190/64)  BP(mean): 79 (24 Feb 2018 17:29) (79 - 153)  RR: 30 (25 Feb 2018 05:05) (18 - 36)  SpO2: 90% (25 Feb 2018 05:05) (83% - 100%)    ABG - ( 25 Feb 2018 00:10 )  pH: 7.45  /  pCO2: 33    /  pO2: 70    / HCO3: 24    / Base Excess: -1.0  /  SaO2: 95.3                      LABS:                        8.0    30.21 )-----------( 454      ( 25 Feb 2018 05:26 )             23.6     02-25    143  |  102  |  78<H>  ----------------------------<  141<H>  4.8   |  24  |  1.73<H>    Ca    9.5      25 Feb 2018 05:25  Phos  3.8     02-25  Mg     1.8     02-25    < from: CT Chest No Cont (02.24.18 @ 14:58) >  CHEST:     LUNGS AND LARGE AIRWAYS: Patent central airways.  Extensive opacities in   both lung, most severe in the left upper lobe, new.   PLEURA: Small bilateral pleural effusions, increased from 2/17/2018.  VESSELS: Atherosclerotic calcifications.  HEART: Cardiomegaly. No pericardial effusion. Coronary calcifications.   Aortic valvular and mitral annular calcifications.  MEDIASTINUM AND GINI: Calcified left hilar lymph nodes.  CHEST WALL AND LOWER NECK: Within normal limits.  VISUALIZED UPPER ABDOMEN: Cholelithiasis. Scattered punctate   calcifications within the liver and spleen secondary to prior   granulomatous disease. An indeterminate cystic lesion of 2.5 cm and the   left mid renal pole  BONES: Degenerative changes.    IMPRESSION:   Extensive new opacities bilaterally are likely secondary to multifocal   pneumonia. Small bilateral pleural effusions, increased.    < end of copied text >
Infectious Diseases progress note:    Subjective:  c/o SOB.  Denies cough or purulent sputum.  No abd pain, diarrhea, dysuria    ROS:  CONSTITUTIONAL:  feels weak  CARDIOVASCULAR:  No chest pain or palpitations  RESPIRATORY:   sob  GASTROINTESTINAL:  No abd pain, N/V, diarrhea/constipation  EXTREMITIES:  No swelling or joint pain  GENITOURINARY:  No burning on urination, increased frequency or urgency.  No flank pain  NEUROLOGIC:  No HA, visual disturbances  SKIN: No rashes    Allergies    lisinopril (Anaphylaxis)  penicillin (Anaphylaxis)    Intolerances        ANTIBIOTICS/RELEVANT:  antimicrobials  aztreonam  IVPB 1000 milliGRAM(s) IV Intermittent every 12 hours  vancomycin  IVPB 750 milliGRAM(s) IV Intermittent every 24 hours    immunologic:    OTHER:  acetaminophen   Tablet. 650 milliGRAM(s) Oral every 6 hours PRN  ALBUTerol    90 MICROgram(s) HFA Inhaler 2 Puff(s) Inhalation every 6 hours PRN  aluminum hydroxide/magnesium hydroxide/simethicone Suspension 30 milliLiter(s) Oral every 6 hours PRN  amLODIPine   Tablet 10 milliGRAM(s) Oral daily  aspirin 325 milliGRAM(s) Oral daily  atorvastatin 20 milliGRAM(s) Oral at bedtime  benzocaine 15 mG/menthol 3.6 mG Lozenge 1 Lozenge Oral every 4 hours PRN  buDESOnide 160 MICROgram(s)/formoterol 4.5 MICROgram(s) Inhaler 2 Puff(s) Inhalation two times a day  cholecalciferol 1000 Unit(s) Oral daily  cinacalcet 30 milliGRAM(s) Oral <User Schedule>  ferrous    sulfate 325 milliGRAM(s) Oral daily  furosemide    Tablet 20 milliGRAM(s) Oral every 48 hours  heparin  Injectable 5000 Unit(s) SubCutaneous every 12 hours  hydrALAZINE 75 milliGRAM(s) Oral three times a day  isosorbide   mononitrate ER Tablet (IMDUR) 60 milliGRAM(s) Oral daily  loratadine 10 milliGRAM(s) Oral daily  melatonin 3 milliGRAM(s) Oral at bedtime  metoprolol     tartrate 100 milliGRAM(s) Oral two times a day  multivitamin 1 Tablet(s) Oral daily  pantoprazole    Tablet 40 milliGRAM(s) Oral before breakfast  sertraline 100 milliGRAM(s) Oral daily  simethicone 160 milliGRAM(s) Chew every 12 hours      Objective:  Vital Signs Last 24 Hrs  T(C): 36.7 (23 Feb 2018 21:57), Max: 37 (23 Feb 2018 13:40)  T(F): 98 (23 Feb 2018 21:57), Max: 98.6 (23 Feb 2018 13:40)  HR: 63 (23 Feb 2018 21:57) (63 - 81)  BP: 150/45 (23 Feb 2018 21:57) (147/44 - 162/50)  BP(mean): --  RR: 18 (23 Feb 2018 21:57) (18 - 18)  SpO2: 96% (23 Feb 2018 21:57) (92% - 98%)    PHYSICAL EXAM:  Constitutional:NAD  Eyes:VICTORIANO, EOMI  Ear/Nose/Throat: no thrush, mucositis.  Moist mucous membranes	  Neck:no JVD, no lymphadenopathy, supple  Respiratory: CTA nick  Cardiovascular: S1S2 RRR, no murmurs  Gastrointestinal:soft, nontender,  nondistended (+) BS  Extremities:no e/e/c  Skin:  no rashes, open wounds or ulcerations        LABS:                        7.7    22.63 )-----------( 397      ( 23 Feb 2018 05:30 )             23.7     02-23    140  |  103  |  63<H>  ----------------------------<  152<H>  4.3   |  21<L>  |  1.57<H>    Ca    9.6      23 Feb 2018 05:30  Phos  3.1     02-22  Mg     1.5     02-23            Procalcitonin, Serum: 0.29 (02-21 @ 06:55)        Vancomycin Level, Trough: 11.5 ug/mL (02-23 @ 12:58)      MICROBIOLOGY:    no new culture data      RADIOLOGY & ADDITIONAL STUDIES:    < from: Xray Chest 1 View- PORTABLE-Routine (02.23.18 @ 07:34) >  FINDINGS:  Cardiac size cannot be determined on this AP projection.    Patchy opacities in the left upper lobe that are increased from prior. No   pneumothorax. No pleural effusions.    Degenerative changes of spine.    IMPRESSION: Patchy opacities in the left upper lobe consistent with   pneumonia are increased from prior.    < end of copied text >
Patient seen and examined at bedside.    Overnight Events: Continues to be in AF, rate controlled.    Review Of Systems: No chest pain, shortness of breath, or palpitations            Medications:  acetaminophen   Tablet. 650 milliGRAM(s) Oral every 6 hours PRN  ALBUTerol    90 MICROgram(s) HFA Inhaler 2 Puff(s) Inhalation every 6 hours PRN  aluminum hydroxide/magnesium hydroxide/simethicone Suspension 30 milliLiter(s) Oral every 6 hours PRN  amLODIPine   Tablet 10 milliGRAM(s) Oral daily  aspirin 325 milliGRAM(s) Oral daily  atorvastatin 20 milliGRAM(s) Oral at bedtime  aztreonam  IVPB 1000 milliGRAM(s) IV Intermittent every 12 hours  benzocaine 15 mG/menthol 3.6 mG Lozenge 1 Lozenge Oral every 4 hours PRN  buDESOnide 160 MICROgram(s)/formoterol 4.5 MICROgram(s) Inhaler 2 Puff(s) Inhalation two times a day  cholecalciferol 1000 Unit(s) Oral daily  cinacalcet 30 milliGRAM(s) Oral <User Schedule>  ferrous    sulfate 325 milliGRAM(s) Oral daily  furosemide    Tablet 20 milliGRAM(s) Oral every 48 hours  heparin  Injectable 5000 Unit(s) SubCutaneous every 12 hours  hydrALAZINE 75 milliGRAM(s) Oral three times a day  isosorbide   mononitrate ER Tablet (IMDUR) 60 milliGRAM(s) Oral daily  loratadine 10 milliGRAM(s) Oral daily  melatonin 3 milliGRAM(s) Oral at bedtime  metoprolol     tartrate 100 milliGRAM(s) Oral two times a day  multivitamin 1 Tablet(s) Oral daily  pantoprazole    Tablet 40 milliGRAM(s) Oral before breakfast  sertraline 100 milliGRAM(s) Oral daily  vancomycin  IVPB 750 milliGRAM(s) IV Intermittent every 24 hours      PAST MEDICAL & SURGICAL HISTORY:  Congestive heart failure (CHF)  Pulmonary hypertension  Emphysema of lung  Femoral Artery Thrombosis: partiac oclusion with no inteventions as per Pt on ASA Plavix  History of Colonoscopy with Polypectomy  History of Hysterectomy  Diverticulosis of Colon  Breast Cancer: S/P Lt lumpectomy &amp; RT  Dyslipidemia  DM (Diabetes Mellitus)  HTN (Hypertension)  COPD (Chronic Obstructive Pulmonary Disease)  History of tonsillectomy  S/P lumpectomy, left breast: 1998  H/O total hysterectomy: 1999      Vitals:  T(F): 98 (02-23), Max: 98.3 (02-22)  HR: 70 (02-23) (61 - 70)  BP: 157/56 (02-23) (155/48 - 170/48)  RR: 18 (02-23)  SpO2: 98% (02-23)  I&O's Summary    22 Feb 2018 07:01  -  23 Feb 2018 07:00  --------------------------------------------------------  IN: 540 mL / OUT: 300 mL / NET: 240 mL        Physical Exam:  Appearance: Normal	  HEENT:   Normal oral mucosa, PERRL, EOMI	  Lymphatic: No lymphadenopathy  Cardiovascular: Normal S1 S2, ireeg rhythm, No JVD, No murmurs, No edema  Respiratory: Lungs clear to auscultation	  Psychiatry: A & O x 3, Mood & affect appropriate  Gastrointestinal:  Soft, Non-tender, + BS	  Skin: No rashes, No ecchymoses, No cyanosis	  Neurologic: Non-focal  Extremities: Normal range of motion, No clubbing, cyanosis or edema  Vascular: Peripheral pulses palpable 2+ bilaterally                          7.7    22.63 )-----------( 397      ( 23 Feb 2018 05:30 )             23.7     02-23    140  |  103  |  63<H>  ----------------------------<  152<H>  4.3   |  21<L>  |  1.57<H>    Ca    9.6      23 Feb 2018 05:30  Phos  3.1     02-22  Mg     1.5     02-23        CARDIAC MARKERS ( 21 Feb 2018 10:30 )  x     / < 0.06 ng/mL / 7 u/L / 1.00 ng/mL / x        Interpretation of Telemetry:  AF 60-70s      A/P:  85 yo F with h/o COPD on 3L of O2 at home, HTN, CHF, DM, HLD, diverticulosis, breast ca s/p lumpectomy and RT, presents for ADHF now with NSVT. Course c/b AF.    AF. CHADS-VASc 6. No records showing AF in the past. TTE showing severely dilated LA.  - AC when able    - will discuss SUNDAR/CV when able to AC and after infection resolves  - cont current lopressor    NSVT, symptomatic, resolved.    - continue Lopressor    Ernesto Mckoy MD  Cardiology Fellow [id number]
CARDIOLOGY FOLLOW UP - Dr. Holliday    CC  no chest pain   less SOB        PHYSICAL EXAM:  T(C): 36.8 (02-15-18 @ 06:09), Max: 37.3 (02-14-18 @ 14:15)  HR: 66 (02-15-18 @ 06:09) (61 - 74)  BP: 157/55 (02-15-18 @ 06:09) (150/56 - 181/53)  RR: 18 (02-15-18 @ 06:09) (17 - 18)  SpO2: 100% (02-15-18 @ 06:09) (98% - 100%)  Wt(kg): --  I&O's Summary    14 Feb 2018 07:01  -  15 Feb 2018 07:00  --------------------------------------------------------  IN: 500 mL / OUT: 550 mL / NET: -50 mL        Appearance: Normal	  Cardiovascular: Normal S1 S2,RRR, + sys murmur   Respiratory: diminished at base  	  Gastrointestinal:  Soft, Non-tender, + BS	  Extremities: Normal range of motion, No clubbing, cyanosis or edema        MEDICATIONS  (STANDING):  amLODIPine   Tablet 10 milliGRAM(s) Oral daily  aspirin enteric coated 81 milliGRAM(s) Oral daily  atorvastatin 20 milliGRAM(s) Oral at bedtime  buDESOnide 160 MICROgram(s)/formoterol 4.5 MICROgram(s) Inhaler 2 Puff(s) Inhalation two times a day  cholecalciferol 1000 Unit(s) Oral daily  cinacalcet 30 milliGRAM(s) Oral <User Schedule>  clopidogrel Tablet 75 milliGRAM(s) Oral daily  ferrous    sulfate 325 milliGRAM(s) Oral daily  furosemide   Injectable 40 milliGRAM(s) IV Push two times a day  heparin  Injectable 5000 Unit(s) SubCutaneous every 12 hours  hydrALAZINE 25 milliGRAM(s) Oral four times a day  losartan 50 milliGRAM(s) Oral daily  metoprolol     tartrate 25 milliGRAM(s) Oral two times a day  multivitamin 1 Tablet(s) Oral daily  pantoprazole    Tablet 40 milliGRAM(s) Oral before breakfast  predniSONE   Tablet 40 milliGRAM(s) Oral daily  sertraline 100 milliGRAM(s) Oral daily      TELEMETRY: NSR/ 1st degree AVB/   nonconducted PAC 	    ECG:  	  RADIOLOGY:   DIAGNOSTIC TESTING:  [ ] Echocardiogram:  [ ]  Catheterization:  [ ] Stress Test:    OTHER: 	        < from: CT Chest No Cont (02.14.18 @ 17:51) >    INTERPRETATION:  Small right pleural effusion.  Emphysematous changes of the lungs.  Cardiomegaly  LABS:	 	Cholelithiasis          < end of copied text >      LABS                          9.1    8.99  )-----------( 326      ( 15 Feb 2018 06:30 )             26.4     02-15    142  |  103  |  28<H>  ----------------------------<  89  3.9   |  22  |  1.40<H>    Ca    9.1      15 Feb 2018 06:30  Phos  3.0     02-14  Mg     1.5     02-15    TPro  6.6  /  Alb  3.5  /  TBili  0.6  /  DBili  x   /  AST  19  /  ALT  22  /  AlkPhos  106  02-13
CARDIOLOGY FOLLOW UP - Dr. Holliday    CC c.o chest pressure   no sob or palpitations       PHYSICAL EXAM:  T(C): 36.4 (02-18-18 @ 05:43), Max: 36.7 (02-17-18 @ 11:48)  HR: 62 (02-18-18 @ 05:43) (52 - 67)  BP: 174/82 (02-18-18 @ 05:43) (145/51 - 210/70)  RR: 18 (02-18-18 @ 05:43) (17 - 18)  SpO2: 98% (02-18-18 @ 05:43) (98% - 100%)  Wt(kg): --  I&O's Summary    17 Feb 2018 07:01  -  18 Feb 2018 07:00  --------------------------------------------------------  IN: 225 mL / OUT: 550 mL / NET: -325 mL        Appearance: Normal	  Cardiovascular: Normal S1 S2,RRR, sys murmur   Respiratory: Diminished 	  Gastrointestinal:  Soft, Non-tender, + BS	  Extremities: Normal range of motion, No clubbing, cyanosis or edema        MEDICATIONS  (STANDING):  amLODIPine   Tablet 10 milliGRAM(s) Oral daily  aspirin enteric coated 81 milliGRAM(s) Oral daily  atorvastatin 20 milliGRAM(s) Oral at bedtime  buDESOnide 160 MICROgram(s)/formoterol 4.5 MICROgram(s) Inhaler 2 Puff(s) Inhalation two times a day  cholecalciferol 1000 Unit(s) Oral daily  cinacalcet 30 milliGRAM(s) Oral <User Schedule>  clopidogrel Tablet 75 milliGRAM(s) Oral daily  ferrous    sulfate 325 milliGRAM(s) Oral daily  furosemide    Tablet 20 milliGRAM(s) Oral daily  heparin  Injectable 5000 Unit(s) SubCutaneous every 12 hours  hydrALAZINE 25 milliGRAM(s) Oral four times a day  loratadine 10 milliGRAM(s) Oral daily  melatonin 3 milliGRAM(s) Oral at bedtime  metoprolol     tartrate 75 milliGRAM(s) Oral two times a day  multivitamin 1 Tablet(s) Oral daily  pantoprazole    Tablet 40 milliGRAM(s) Oral before breakfast  predniSONE   Tablet 40 milliGRAM(s) Oral daily  sertraline 100 milliGRAM(s) Oral daily      TELEMETRY: NSR/ 1st degree AVB / PVC	    ECG:  	  RADIOLOGY:   DIAGNOSTIC TESTING:  [ ] Echocardiogram:  [ ]  Catheterization:  [ ] Stress Test:    OTHER: 	  < from: CT Chest No Cont (02.17.18 @ 09:45) >  CHEST:     LUNGS AND LARGE AIRWAYS: The bilateral groundglass opacities and septal   thickening have slightly decreased since 2/14/2018. The 2 mm nodule in   the right upper lobe was better shown on the prior study. The calcified   left lower lobe nodule is unchanged. The groundglass opacities are   increased in density on the expiratory phase of imaging. There is no   convincing area of air trapping. The right middle lobe and right lower   lobe subpleural cysts are unchanged.    PLEURA: The bilateral trace pleural effusions have decreased in size. No   pneumothorax.    VESSELS: Atherosclerotic calcifications of the aorta and coronary   arteries.    HEART: The heart is enlarged. In particular, the left atrium is enlarged.   Coronary artery calcifications involving left anterior descending,   circumflex, and right coronary arteries. Aortic valve calcifications.   Mitralannular calcifications. Possible calcifications of the mitral   valve. No pericardial effusion.    MEDIASTINUM AND GINI: Small nonspecific solid hilar lymph nodes, some   which are calcified.    CHEST WALL AND LOWER NECK: Status post left-sided mastectomy.    VISUALIZED UPPER ABDOMEN: Scattered calcifications within the liver,   spleen, and adrenal glands. Incompletely visualized left renal cyst.   Cholelithiasis.    BONES: Degenerative changes.    IMPRESSION:     1.  The findings are most compatible with resolving pulmonary edema   rather than infection or interstitial lung disease. Follow-up is   recommended to confirm resolution in 4 weeks.      < end of copied text >    LABS:	 	                                8.8    18.86 )-----------( 393      ( 18 Feb 2018 07:14 )             25.6     02-18    142  |  106  |  61<H>  ----------------------------<  106<H>  4.1   |  23  |  1.58<H>    Ca    9.4      18 Feb 2018 07:14  Mg     1.7     02-18
CARDIOLOGY FOLLOW UP - Dr. Holliday    CC c.o of dyspnea/ chest pressure -  no change         PHYSICAL EXAM:  T(C): 36.9 (02-22-18 @ 06:37), Max: 36.9 (02-22-18 @ 06:37)  HR: 65 (02-22-18 @ 07:52) (63 - 75)  BP: 144/62 (02-22-18 @ 07:52) (144/62 - 162/50)  RR: 18 (02-22-18 @ 06:37) (17 - 18)  SpO2: 98% (02-22-18 @ 06:37) (98% - 98%)  Wt(kg): --  I&O's Summary    21 Feb 2018 07:01  -  22 Feb 2018 07:00  --------------------------------------------------------  IN: 0 mL / OUT: 750 mL / NET: -750 mL        Appearance: Normal	  Cardiovascular: Normal S1 S2, irregular, sys murmur   Respiratory: CTA / left diminished bs  Gastrointestinal:  Soft, Non-tender, + BS	  Extremities: Normal range of motion, No clubbing, cyanosis or edema        MEDICATIONS  (STANDING):  amLODIPine   Tablet 10 milliGRAM(s) Oral daily  atorvastatin 20 milliGRAM(s) Oral at bedtime  aztreonam  IVPB 1000 milliGRAM(s) IV Intermittent every 12 hours  buDESOnide 160 MICROgram(s)/formoterol 4.5 MICROgram(s) Inhaler 2 Puff(s) Inhalation two times a day  cholecalciferol 1000 Unit(s) Oral daily  cinacalcet 30 milliGRAM(s) Oral <User Schedule>  ferrous    sulfate 325 milliGRAM(s) Oral daily  furosemide    Tablet 20 milliGRAM(s) Oral every 48 hours  heparin  Injectable 5000 Unit(s) SubCutaneous every 12 hours  hydrALAZINE 75 milliGRAM(s) Oral three times a day  loratadine 10 milliGRAM(s) Oral daily  melatonin 3 milliGRAM(s) Oral at bedtime  metoprolol     tartrate 100 milliGRAM(s) Oral two times a day  multivitamin 1 Tablet(s) Oral daily  pantoprazole    Tablet 40 milliGRAM(s) Oral before breakfast  sertraline 100 milliGRAM(s) Oral daily  vancomycin  IVPB 750 milliGRAM(s) IV Intermittent every 24 hours      TELEMETRY: afib HR 60s  	    ECG:  	  RADIOLOGY:   DIAGNOSTIC TESTING:  [ ] Echocardiogram:  [ ]  Catheterization:  [ ] Stress Test:    OTHER: 	    < from: Xray Chest 1 View- PORTABLE-Urgent (02.21.18 @ 10:27) >    IMPRESSION: Patchy opacity in the left upper lung field, suspicious for   infection.    < end of copied text >        LABS:	 	  CKMB: 1.00 ng/mL (02-21 @ 10:30)                          7.7    26.33 )-----------( 424      ( 22 Feb 2018 06:22 )             23.9     02-22    140  |  102  |  59<H>  ----------------------------<  129<H>  4.7   |  18<L>  |  1.71<H>    Ca    9.7      22 Feb 2018 06:22  Phos  3.1     02-22  Mg     1.6     02-22
CARDIOLOGY FOLLOW UP - Dr. Holliday    CC no chest pain or sob    occasional chest pressure     PHYSICAL EXAM:  T(C): 36.4 (02-20-18 @ 05:27), Max: 36.9 (02-19-18 @ 21:03)  HR: 58 (02-20-18 @ 05:27) (49 - 66)  BP: 157/55 (02-20-18 @ 05:27) (157/55 - 193/55)  RR: 16 (02-20-18 @ 05:27) (16 - 17)  SpO2: 98% (02-20-18 @ 05:27) (97% - 98%)  Wt(kg): --  I&O's Summary    19 Feb 2018 07:01  -  20 Feb 2018 07:00  --------------------------------------------------------  IN: 535 mL / OUT: 725 mL / NET: -190 mL        Appearance: Normal	  Cardiovascular: Normal S1 S2,RRR, + sys  murmurs  Respiratory: Diminished   Gastrointestinal:  Soft, Non-tender, + BS	  Extremities: Normal range of motion, No clubbing, cyanosis or edema        MEDICATIONS  (STANDING):  amLODIPine   Tablet 10 milliGRAM(s) Oral daily  aspirin enteric coated 81 milliGRAM(s) Oral daily  atorvastatin 20 milliGRAM(s) Oral at bedtime  buDESOnide 160 MICROgram(s)/formoterol 4.5 MICROgram(s) Inhaler 2 Puff(s) Inhalation two times a day  cholecalciferol 1000 Unit(s) Oral daily  cinacalcet 30 milliGRAM(s) Oral <User Schedule>  clopidogrel Tablet 75 milliGRAM(s) Oral daily  ferrous    sulfate 325 milliGRAM(s) Oral daily  furosemide    Tablet 20 milliGRAM(s) Oral daily  heparin  Injectable 5000 Unit(s) SubCutaneous every 12 hours  hydrALAZINE 75 milliGRAM(s) Oral three times a day  isosorbide   mononitrate ER Tablet (IMDUR) 30 milliGRAM(s) Oral daily  loratadine 10 milliGRAM(s) Oral daily  melatonin 3 milliGRAM(s) Oral at bedtime  metoprolol     tartrate 75 milliGRAM(s) Oral two times a day  multivitamin 1 Tablet(s) Oral daily  pantoprazole    Tablet 40 milliGRAM(s) Oral before breakfast  sertraline 100 milliGRAM(s) Oral daily      TELEMETRY: NSR/ 1st degree AVB / PVC 	    ECG:  	  RADIOLOGY:   DIAGNOSTIC TESTING:  [ ] Echocardiogram:  [ ]  Catheterization:  [ ] Stress Test:    OTHER: 	    LABS:	 	                                8.0    20.10 )-----------( 378      ( 20 Feb 2018 07:14 )             23.6     02-20    145  |  107  |  63<H>  ----------------------------<  114<H>  4.1   |  21<L>  |  1.79<H>    Ca    9.6      20 Feb 2018 07:14  Mg     1.6     02-19
CARDIOLOGY FOLLOW UP - Dr. Holliday    CC no chest pain or sob   no palpitations       PHYSICAL EXAM:  T(C): 37.1 (02-17-18 @ 06:09), Max: 37.1 (02-17-18 @ 06:09)  HR: 57 (02-17-18 @ 06:09) (57 - 76)  BP: 158/52 (02-17-18 @ 06:09) (157/40 - 158/56)  RR: 18 (02-17-18 @ 06:09) (17 - 18)  SpO2: 99% (02-17-18 @ 06:09) (99% - 99%)  Wt(kg): --  I&O's Summary    16 Feb 2018 07:01  -  17 Feb 2018 07:00  --------------------------------------------------------  IN: 630 mL / OUT: 0 mL / NET: 630 mL        Appearance: Normal	  Cardiovascular: Normal S1 S2,RRR, sys murmur   Respiratory: Diminished bl at base   Gastrointestinal:  Soft, Non-tender, + BS	  Extremities: Normal range of motion, No clubbing, cyanosis or edema        MEDICATIONS  (STANDING):  amLODIPine   Tablet 10 milliGRAM(s) Oral daily  aspirin enteric coated 81 milliGRAM(s) Oral daily  atorvastatin 20 milliGRAM(s) Oral at bedtime  buDESOnide 160 MICROgram(s)/formoterol 4.5 MICROgram(s) Inhaler 2 Puff(s) Inhalation two times a day  cholecalciferol 1000 Unit(s) Oral daily  cinacalcet 30 milliGRAM(s) Oral <User Schedule>  clopidogrel Tablet 75 milliGRAM(s) Oral daily  ferrous    sulfate 325 milliGRAM(s) Oral daily  furosemide    Tablet 40 milliGRAM(s) Oral daily  heparin  Injectable 5000 Unit(s) SubCutaneous every 12 hours  hydrALAZINE 25 milliGRAM(s) Oral four times a day  loratadine 10 milliGRAM(s) Oral daily  melatonin 3 milliGRAM(s) Oral at bedtime  metoprolol     tartrate 75 milliGRAM(s) Oral two times a day  multivitamin 1 Tablet(s) Oral daily  pantoprazole    Tablet 40 milliGRAM(s) Oral before breakfast  predniSONE   Tablet 40 milliGRAM(s) Oral daily  sertraline 100 milliGRAM(s) Oral daily      TELEMETRY: NSR/ 1st degree avb   4 beats WCT 	    ECG:  	  RADIOLOGY:   DIAGNOSTIC TESTING:  [ ] Echocardiogram:  [ ]  Catheterization:  [ ] Stress Test:    OTHER: 	    LABS:	 	                                8.5    15.75 )-----------( 388      ( 17 Feb 2018 06:30 )             24.7     02-17    141  |  104  |  56<H>  ----------------------------<  103<H>  4.0   |  22  |  1.67<H>    Ca    9.9      17 Feb 2018 06:30  Mg     1.8     02-17
CARDIOLOGY FOLLOW UP - Dr. Holliday    CC no chest pain or sob   occasional palpitations        PHYSICAL EXAM:  T(C): 36.8 (02-16-18 @ 08:57), Max: 37.1 (02-15-18 @ 23:14)  HR: 61 (02-16-18 @ 08:57) (61 - 70)  BP: 143/47 (02-16-18 @ 08:57) (138/48 - 168/64)  RR: 17 (02-16-18 @ 08:57) (16 - 18)  SpO2: 97% (02-16-18 @ 08:57) (96% - 100%)  Wt(kg): --  I&O's Summary    15 Feb 2018 07:01  -  16 Feb 2018 07:00  --------------------------------------------------------  IN: 1070 mL / OUT: 450 mL / NET: 620 mL        Appearance: Normal	  Cardiovascular: Normal S1 S2,RRR, sys murmur   Respiratory: Lungs clear to auscultation	  Gastrointestinal:  Soft, Non-tender, + BS	  Extremities: Normal range of motion, No clubbing, cyanosis or edema        MEDICATIONS  (STANDING):  amLODIPine   Tablet 10 milliGRAM(s) Oral daily  aspirin enteric coated 81 milliGRAM(s) Oral daily  atorvastatin 20 milliGRAM(s) Oral at bedtime  buDESOnide 160 MICROgram(s)/formoterol 4.5 MICROgram(s) Inhaler 2 Puff(s) Inhalation two times a day  cholecalciferol 1000 Unit(s) Oral daily  cinacalcet 30 milliGRAM(s) Oral <User Schedule>  clopidogrel Tablet 75 milliGRAM(s) Oral daily  ferrous    sulfate 325 milliGRAM(s) Oral daily  furosemide    Tablet 40 milliGRAM(s) Oral daily  heparin  Injectable 5000 Unit(s) SubCutaneous every 12 hours  hydrALAZINE 25 milliGRAM(s) Oral four times a day  loratadine 10 milliGRAM(s) Oral daily  losartan 50 milliGRAM(s) Oral daily  melatonin 3 milliGRAM(s) Oral at bedtime  metoprolol     tartrate 50 milliGRAM(s) Oral two times a day  multivitamin 1 Tablet(s) Oral daily  pantoprazole    Tablet 40 milliGRAM(s) Oral before breakfast  predniSONE   Tablet 40 milliGRAM(s) Oral daily  sertraline 100 milliGRAM(s) Oral daily      TELEMETRY: NSR/ sinus bradycardia / 1st degree AVB 	    15 sec WCT noted this am   ECG:  	  RADIOLOGY:   DIAGNOSTIC TESTING:  [x ] Echocardiogram:  < from: Transthoracic Echocardiogram (02.15.18 @ 13:58) >  Ejection Fraction (Teicholtz): 69 %    CONCLUSIONS:  1. Mitral annular calcification, otherwise normal mitral  valve. Mild mitral regurgitation.  2. Aortic valve leaflet morphology not well visualized.  Peak transaortic valve gradient equals 70 mm Hg, mean  transaortic valve gradient equals 41 mm Hg.  Despite the  transaortic gradients, the gross appearance of the valve is  not consistent with severe aortic stenosis.  Cannot exclude  the presence of subvalvular LVOT obstruction (ie. subaortic  membrane). Mild-moderate aortic regurgitation.  3. Severely dilated left atrium.  LA volume index = 65  cc/m2.  4. Normal left ventricular internal dimensions and wall  thicknesses.  5. Normal left ventricular systolic function. No segmental  wall motion abnormalities.  6. Normal right ventricular size and function.  Consider SUNDAR for further evaluation of the aortic valve and  possible causes of LVOT obstruction, if clinically  indicated.  ------------------------------------------------------------------------    < end of copied text >      [ ] Stress Test:    OTHER: 	    LABS:	 	                                8.3    9.91  )-----------( 358      ( 16 Feb 2018 06:30 )             24.6     02-16    139  |  102  |  41<H>  ----------------------------<  134<H>  4.1   |  21<L>  |  1.53<H>    Ca    9.1      16 Feb 2018 06:30  Mg     1.9     02-16
CARDIOLOGY FOLLOW UP NOTE - DR. CODY    Subjective:    appears more comfortable today   no cp  still c/o dyspnea      PHYSICAL EXAM:  T(C): 36.6 (02-25-18 @ 05:05), Max: 36.7 (02-24-18 @ 15:41)  HR: 82 (02-25-18 @ 05:05) (67 - 82)  BP: 152/42 (02-25-18 @ 05:05) (126/41 - 190/64)  RR: 30 (02-25-18 @ 05:05) (18 - 36)  SpO2: 90% (02-25-18 @ 05:05) (83% - 100%)  Wt(kg): --  I&O's Summary      Appearance: Normal	  Cardiovascular: Normal S1 S2, irreg sm  Respiratory: faint crackles left lung  Gastrointestinal:  Soft, Non-tender, + BS	  Extremities: Normal range of motion, no edema    MEDICATIONS  (STANDING):  amLODIPine   Tablet 10 milliGRAM(s) Oral daily  aspirin 325 milliGRAM(s) Oral daily  atorvastatin 20 milliGRAM(s) Oral at bedtime  azithromycin  IVPB      azithromycin  IVPB 500 milliGRAM(s) IV Intermittent every 24 hours  aztreonam  IVPB 1000 milliGRAM(s) IV Intermittent every 12 hours  buDESOnide 160 MICROgram(s)/formoterol 4.5 MICROgram(s) Inhaler 2 Puff(s) Inhalation two times a day  cholecalciferol 1000 Unit(s) Oral daily  cinacalcet 30 milliGRAM(s) Oral <User Schedule>  ferrous    sulfate 325 milliGRAM(s) Oral daily  furosemide    Tablet 20 milliGRAM(s) Oral every 48 hours  heparin  Injectable 5000 Unit(s) SubCutaneous every 12 hours  hydrALAZINE 75 milliGRAM(s) Oral three times a day  isosorbide   mononitrate ER Tablet (IMDUR) 60 milliGRAM(s) Oral daily  lactobacillus acidophilus 1 Tablet(s) Oral daily  loratadine 10 milliGRAM(s) Oral daily  melatonin 3 milliGRAM(s) Oral at bedtime  metoprolol     tartrate 100 milliGRAM(s) Oral two times a day  micafungin IVPB 100 milliGRAM(s) IV Intermittent every 24 hours  micafungin IVPB      multivitamin 1 Tablet(s) Oral daily  pantoprazole    Tablet 40 milliGRAM(s) Oral before breakfast  sertraline 100 milliGRAM(s) Oral daily  sodium chloride 3%  Inhalation 4 milliLiter(s) Inhalation four times a day  tigecycline IVPB 50 milliGRAM(s) IV Intermittent every 12 hours  tigecycline IVPB          TELEMETRY: 	    ECG:  	  RADIOLOGY:   DIAGNOSTIC TESTING:  [ ] Echocardiogram:  [ ] Catheterization:  [ ] Stress Test:    OTHER: 	    LABS:	 	    CARDIAC MARKERS:                                8.0    30.21 )-----------( 454      ( 25 Feb 2018 05:26 )             23.6     02-25    143  |  102  |  78<H>  ----------------------------<  141<H>  4.8   |  24  |  1.73<H>    Ca    9.5      25 Feb 2018 05:25  Phos  3.8     02-25  Mg     1.8     02-25      proBNP:     Lipid Profile:   HgA1c:
CARDIOLOGY FOLLOW UP NOTE - DR. CODY    Subjective:    inc sob today   wbc elevated  no cp    PHYSICAL EXAM:  T(C): 34.3 (02-24-18 @ 13:21), Max: 36.7 (02-23-18 @ 21:57)  HR: 70 (02-24-18 @ 13:21) (63 - 81)  BP: 146/46 (02-24-18 @ 13:21) (143/93 - 155/48)  RR: 18 (02-24-18 @ 13:21) (18 - 18)  SpO2: 94% (02-24-18 @ 13:21) (92% - 98%)  Wt(kg): --  I&O's Summary    23 Feb 2018 07:01  -  24 Feb 2018 07:00  --------------------------------------------------------  IN: 500 mL / OUT: 400 mL / NET: 100 mL        Appearance: Normal	  Cardiovascular: Normal S1 S2, irreg sm  Respiratory: cackles left base	  Gastrointestinal:  Soft, Non-tender, + BS	  Extremities: Normal range of motion, no inc edema    MEDICATIONS  (STANDING):  amLODIPine   Tablet 10 milliGRAM(s) Oral daily  aspirin 325 milliGRAM(s) Oral daily  atorvastatin 20 milliGRAM(s) Oral at bedtime  aztreonam  IVPB 1000 milliGRAM(s) IV Intermittent every 12 hours  buDESOnide 160 MICROgram(s)/formoterol 4.5 MICROgram(s) Inhaler 2 Puff(s) Inhalation two times a day  cholecalciferol 1000 Unit(s) Oral daily  cinacalcet 30 milliGRAM(s) Oral <User Schedule>  ferrous    sulfate 325 milliGRAM(s) Oral daily  furosemide    Tablet 20 milliGRAM(s) Oral every 48 hours  heparin  Injectable 5000 Unit(s) SubCutaneous every 12 hours  hydrALAZINE 75 milliGRAM(s) Oral three times a day  isosorbide   mononitrate ER Tablet (IMDUR) 60 milliGRAM(s) Oral daily  loratadine 10 milliGRAM(s) Oral daily  melatonin 3 milliGRAM(s) Oral at bedtime  metoprolol     tartrate 100 milliGRAM(s) Oral two times a day  micafungin IVPB 100 milliGRAM(s) IV Intermittent once  micafungin IVPB      multivitamin 1 Tablet(s) Oral daily  pantoprazole    Tablet 40 milliGRAM(s) Oral before breakfast  sertraline 100 milliGRAM(s) Oral daily  simethicone 160 milliGRAM(s) Chew every 12 hours  sodium chloride 3%  Inhalation 10 milliLiter(s) Inhalation four times a day  vancomycin  IVPB 750 milliGRAM(s) IV Intermittent every 24 hours      TELEMETRY: 	    ECG:  	  RADIOLOGY:   DIAGNOSTIC TESTING:  [ ] Echocardiogram:  [ ] Catheterization:  [ ] Stress Test:    OTHER: 	    LABS:	 	    CARDIAC MARKERS:                                7.5    30.86 )-----------( 486      ( 24 Feb 2018 11:37 )             22.3     02-24    140  |  101  |  73<H>  ----------------------------<  145<H>  4.7   |  21<L>  |  1.54<H>    Ca    9.3      24 Feb 2018 07:45  Mg     1.3     02-24      proBNP:     Lipid Profile:   HgA1c:
CARDIOLOGY FOLLOW UP NOTE - DR. CODY    Subjective:    still with int sob  no cp      PHYSICAL EXAM:  T(C): 36.6 (02-19-18 @ 05:13), Max: 36.6 (02-18-18 @ 22:19)  HR: 49 (02-19-18 @ 12:34) (49 - 68)  BP: 180/49 (02-19-18 @ 12:34) (159/50 - 198/63)  RR: 17 (02-19-18 @ 12:34) (17 - 18)  SpO2: 98% (02-19-18 @ 12:34) (98% - 99%)  Wt(kg): --  I&O's Summary    18 Feb 2018 07:01  -  19 Feb 2018 07:00  --------------------------------------------------------  IN: 250 mL / OUT: 450 mL / NET: -200 mL        Appearance: Normal	  Cardiovascular: Normal S1 S2,RRR, sm  Respiratory: Lungs clear to auscultation	  Gastrointestinal:  Soft, Non-tender, + BS	  Extremities: Normal range of motion, No clubbing, cyanosis or edema      MEDICATIONS  (STANDING):  amLODIPine   Tablet 10 milliGRAM(s) Oral daily  aspirin enteric coated 81 milliGRAM(s) Oral daily  atorvastatin 20 milliGRAM(s) Oral at bedtime  buDESOnide 160 MICROgram(s)/formoterol 4.5 MICROgram(s) Inhaler 2 Puff(s) Inhalation two times a day  cholecalciferol 1000 Unit(s) Oral daily  cinacalcet 30 milliGRAM(s) Oral <User Schedule>  clopidogrel Tablet 75 milliGRAM(s) Oral daily  ferrous    sulfate 325 milliGRAM(s) Oral daily  furosemide    Tablet 20 milliGRAM(s) Oral daily  heparin  Injectable 5000 Unit(s) SubCutaneous every 12 hours  hydrALAZINE 50 milliGRAM(s) Oral three times a day  loratadine 10 milliGRAM(s) Oral daily  melatonin 3 milliGRAM(s) Oral at bedtime  metoprolol     tartrate 75 milliGRAM(s) Oral two times a day  multivitamin 1 Tablet(s) Oral daily  pantoprazole    Tablet 40 milliGRAM(s) Oral before breakfast  sertraline 100 milliGRAM(s) Oral daily      TELEMETRY: 	    ECG:  	  RADIOLOGY:   DIAGNOSTIC TESTING:  [ ] Echocardiogram:  [ ] Catheterization:  [ ] Stress Test:    OTHER: 	    LABS:	 	    CARDIAC MARKERS:                                8.1    18.05 )-----------( 387      ( 19 Feb 2018 06:51 )             24.8     02-19    141  |  105  |  53<H>  ----------------------------<  129<H>  4.4   |  22  |  1.50<H>    Ca    9.5      19 Feb 2018 06:51  Mg     1.6     02-19      proBNP:     Lipid Profile:   HgA1c:
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 as per Pt on ASA Plavix  History of Colonoscopy with Polypectomy  History of Hysterectomy  Diverticulosis of Colon  Breast Cancer: S/P Lt lumpectomy &amp; RT  Dyslipidemia  DM (Diabetes Mellitus)  HTN (Hypertension)  COPD (Chronic Obstructive Pulmonary Disease)  History of tonsillectomy  S/P lumpectomy, left breast: 1998  H/O total hysterectomy: 1999          REVIEW OF SYSTEMS:  CONSTITUTIONAL: weak  EYES: No eye pain, visual disturbances, or discharge  NECK: No pain or stiffness  RESPIRATORY:sob  CARDIOVASCULAR: No chest pain, palpitations, passing out, dizziness,   GASTROINTESTINAL: No abdominal or epigastric pain. No nausea, vomiting, or hematemesis; No diarrhea or constipation. No melena or hematochezia.  GENITOURINARY: No dysuria, frequency, hematuria, or incontinence  NEUROLOGICAL: No headaches,  MUSCULOSKELETAL: No joint pain or swelling; No muscle, back, or extremity pain    Medications:  MEDICATIONS  (STANDING):  amLODIPine   Tablet 10 milliGRAM(s) Oral daily  aspirin 325 milliGRAM(s) Oral daily  atorvastatin 20 milliGRAM(s) Oral at bedtime  azithromycin  IVPB      azithromycin  IVPB 500 milliGRAM(s) IV Intermittent every 24 hours  aztreonam  IVPB 1000 milliGRAM(s) IV Intermittent every 12 hours  buDESOnide 160 MICROgram(s)/formoterol 4.5 MICROgram(s) Inhaler 2 Puff(s) Inhalation two times a day  cholecalciferol 1000 Unit(s) Oral daily  cinacalcet 30 milliGRAM(s) Oral <User Schedule>  ferrous    sulfate 325 milliGRAM(s) Oral daily  furosemide    Tablet 20 milliGRAM(s) Oral every 48 hours  heparin  Injectable 5000 Unit(s) SubCutaneous every 12 hours  hydrALAZINE 75 milliGRAM(s) Oral three times a day  isosorbide   mononitrate ER Tablet (IMDUR) 60 milliGRAM(s) Oral daily  lactobacillus acidophilus 1 Tablet(s) Oral daily  loratadine 10 milliGRAM(s) Oral daily  melatonin 3 milliGRAM(s) Oral at bedtime  metoprolol     tartrate 100 milliGRAM(s) Oral two times a day  micafungin IVPB 100 milliGRAM(s) IV Intermittent every 24 hours  micafungin IVPB      multivitamin 1 Tablet(s) Oral daily  pantoprazole    Tablet 40 milliGRAM(s) Oral before breakfast  sertraline 100 milliGRAM(s) Oral daily  sodium chloride 3%  Inhalation 4 milliLiter(s) Inhalation four times a day  vancomycin  IVPB 750 milliGRAM(s) IV Intermittent every 24 hours    MEDICATIONS  (PRN):  acetaminophen   Tablet. 650 milliGRAM(s) Oral every 6 hours PRN Mild, moderate, or severe, or temp >99.0F, or at discretion of provider  ALBUTerol    90 MICROgram(s) HFA Inhaler 2 Puff(s) Inhalation every 6 hours PRN Bronchospasm  aluminum hydroxide/magnesium hydroxide/simethicone Suspension 30 milliLiter(s) Oral every 6 hours PRN Dyspepsia  benzocaine 15 mG/menthol 3.6 mG Lozenge 1 Lozenge Oral every 4 hours PRN Sore Throat    	    PHYSICAL EXAM:  T(C): 36.6 (02-25-18 @ 05:05), Max: 36.7 (02-24-18 @ 15:41)  HR: 82 (02-25-18 @ 05:05) (67 - 82)  BP: 152/42 (02-25-18 @ 05:05) (126/41 - 190/64)  RR: 30 (02-25-18 @ 05:05) (18 - 36)  SpO2: 90% (02-25-18 @ 05:05) (83% - 100%)  Wt(kg): --  I&O's Summary      Appearance: Normal	  HEENT:   Normal oral mucosa, PERRL, EOMI	  Cardiovascular: Normal S1 S2, No JVD, No murmurs,   Respiratory: dec bs   Psychiatry: A & O x 3,  Gastrointestinal:  Soft, Non-tender, + BS	  Skin: No rashes, No ecchymoses, No cyanosis	  Neurologic: Non-focal  Extremities: Normal range of motion, No clubbing, cyanosis or edema  Vascular: Peripheral pulses palpable     TELEMETRY: 	    ECG:  	  RADIOLOGY:  OTHER: 	  	  LABS:	 	    CARDIAC MARKERS:                                8.0    30.21 )-----------( 454      ( 25 Feb 2018 05:26 )             23.6     02-25    143  |  102  |  78<H>  ----------------------------<  141<H>  4.8   |  24  |  1.73<H>    Ca    9.5      25 Feb 2018 05:25  Phos  3.8     02-25  Mg     1.8     02-25      proBNP:   Lipid Profile:   HgA1c:   TSH:
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 as per Pt on ASA Plavix  History of Colonoscopy with Polypectomy  History of Hysterectomy  Diverticulosis of Colon  Breast Cancer: S/P Lt lumpectomy &amp; RT  Dyslipidemia  DM (Diabetes Mellitus)  HTN (Hypertension)  COPD (Chronic Obstructive Pulmonary Disease)  History of tonsillectomy  S/P lumpectomy, left breast: 1998  H/O total hysterectomy: 1999          REVIEW OF SYSTEMS:  CONSTITUTIONAL:weak  EYES: No eye pain, visual disturbances, or discharge  NECK: No pain or stiffness  RESPIRATORY: No cough, wheezing, chills or hemoptysis; chavez  CARDIOVASCULAR: No chest pain, palpitations, passing out, dizziness, or leg swelling  GASTROINTESTINAL: No abdominal or epigastric pain. No nausea, vomiting, or hematemesis; No diarrhea or constipation. No melena or hematochezia.  GENITOURINARY: No dysuria, frequency, hematuria, or incontinence  NEUROLOGICAL: No headaches, memory loss, loss of strength, numbness, or tremors  MUSCULOSKELETAL: No joint pain or swelling; No muscle, back, or extremity pain    Medications:  MEDICATIONS  (STANDING):  amLODIPine   Tablet 10 milliGRAM(s) Oral daily  aspirin 325 milliGRAM(s) Oral daily  atorvastatin 20 milliGRAM(s) Oral at bedtime  aztreonam  IVPB 1000 milliGRAM(s) IV Intermittent every 12 hours  buDESOnide 160 MICROgram(s)/formoterol 4.5 MICROgram(s) Inhaler 2 Puff(s) Inhalation two times a day  cholecalciferol 1000 Unit(s) Oral daily  cinacalcet 30 milliGRAM(s) Oral <User Schedule>  ferrous    sulfate 325 milliGRAM(s) Oral daily  furosemide    Tablet 20 milliGRAM(s) Oral every 48 hours  heparin  Injectable 5000 Unit(s) SubCutaneous every 12 hours  hydrALAZINE 75 milliGRAM(s) Oral three times a day  isosorbide   mononitrate ER Tablet (IMDUR) 60 milliGRAM(s) Oral daily  loratadine 10 milliGRAM(s) Oral daily  magnesium sulfate  IVPB 2 Gram(s) IV Intermittent once  melatonin 3 milliGRAM(s) Oral at bedtime  metoprolol     tartrate 100 milliGRAM(s) Oral two times a day  multivitamin 1 Tablet(s) Oral daily  pantoprazole    Tablet 40 milliGRAM(s) Oral before breakfast  sertraline 100 milliGRAM(s) Oral daily  simethicone 160 milliGRAM(s) Chew every 12 hours  vancomycin  IVPB 750 milliGRAM(s) IV Intermittent every 24 hours    MEDICATIONS  (PRN):  acetaminophen   Tablet. 650 milliGRAM(s) Oral every 6 hours PRN Mild, moderate, or severe, or temp >99.0F, or at discretion of provider  ALBUTerol    90 MICROgram(s) HFA Inhaler 2 Puff(s) Inhalation every 6 hours PRN Bronchospasm  aluminum hydroxide/magnesium hydroxide/simethicone Suspension 30 milliLiter(s) Oral every 6 hours PRN Dyspepsia  benzocaine 15 mG/menthol 3.6 mG Lozenge 1 Lozenge Oral every 4 hours PRN Sore Throat    	    PHYSICAL EXAM:  T(C): 36.7 (02-24-18 @ 05:55), Max: 37 (02-23-18 @ 13:40)  HR: 77 (02-24-18 @ 05:55) (63 - 81)  BP: 143/93 (02-24-18 @ 05:55) (143/93 - 154/45)  RR: 18 (02-24-18 @ 05:55) (18 - 18)  SpO2: 98% (02-24-18 @ 05:55) (92% - 98%)  Wt(kg): --  I&O's Summary    23 Feb 2018 07:01  -  24 Feb 2018 07:00  --------------------------------------------------------  IN: 500 mL / OUT: 400 mL / NET: 100 mL        Appearance: Normal	  HEENT:   Normal oral mucosa, PERRL, EOMI	  Lymphatic: No lymphadenopathy  Cardiovascular: Normal S1 S2, No JVD,   Respiratory: dec bs   Psychiatry: A & O x 3, Mood & affect appropriate  Gastrointestinal:  Soft, Non-tender, + BS	  Skin: No rashes, No ecchymoses, No cyanosis	  Neurologic: Non-focal  Extremities: Normal range of motion, No clubbing, cyanosis or edema  Vascular: Peripheral pulses palpable     TELEMETRY: 	    ECG:  	  RADIOLOGY:  OTHER: 	  	  LABS:	 	    CARDIAC MARKERS:                                7.1    26.39 )-----------( 460      ( 24 Feb 2018 07:45 )             21.7     02-24    140  |  101  |  73<H>  ----------------------------<  145<H>  4.7   |  21<L>  |  1.54<H>    Ca    9.3      24 Feb 2018 07:45  Mg     1.3     02-24      proBNP:   Lipid Profile:   HgA1c:   TSH:
Chief complaint: SOB  Interval hx: SOB improving    Allergies:  lisinopril (Anaphylaxis)  penicillin (Anaphylaxis)      PAST MEDICAL & SURGICAL HISTORY:  Congestive heart failure (CHF)  Pulmonary hypertension  Emphysema of lung  Femoral Artery Thrombosis: partiac oclusion with no inteventions as per Pt on ASA Plavix  History of Colonoscopy with Polypectomy  History of Hysterectomy  Diverticulosis of Colon  Breast Cancer: S/P Lt lumpectomy &amp; RT  Dyslipidemia  DM (Diabetes Mellitus)  HTN (Hypertension)  COPD (Chronic Obstructive Pulmonary Disease)  History of tonsillectomy  S/P lumpectomy, left breast: 1998  H/O total hysterectomy: 1999      FAMILY HISTORY:  No pertinent family history in first degree relatives      REVIEW OF SYSTEMS:  CONSTITUTIONAL: No fever, weight loss, or fatigue  EYES: No eye pain, visual disturbances, or discharge  NECK: No pain or stiffness  RESPIRATORY: No cough or wheezing, no shortness of breath  CARDIOVASCULAR: No chest pain, palpitations, dizziness, or leg swelling  GASTROINTESTINAL: No abdominal or epigastric pain. No nausea, vomiting, diarrhea or constipation  GENITOURINARY: No dysuria, urinary frequency or urgency, no hematuria  NEUROLOGICAL: No headaches, memory loss, loss of strength, numbness, or tremors  SKIN: No itching, burning, rashes, or lesions   MUSCULOSKELETAL: No joint pain or swelling; No muscle, back, or extremity pain      Medications:  MEDICATIONS  (STANDING):  amLODIPine   Tablet 10 milliGRAM(s) Oral daily  aspirin enteric coated 81 milliGRAM(s) Oral daily  atorvastatin 20 milliGRAM(s) Oral at bedtime  buDESOnide 160 MICROgram(s)/formoterol 4.5 MICROgram(s) Inhaler 2 Puff(s) Inhalation two times a day  cholecalciferol 1000 Unit(s) Oral daily  cinacalcet 30 milliGRAM(s) Oral <User Schedule>  clopidogrel Tablet 75 milliGRAM(s) Oral daily  ferrous    sulfate 325 milliGRAM(s) Oral daily  furosemide   Injectable 40 milliGRAM(s) IV Push two times a day  heparin  Injectable 5000 Unit(s) SubCutaneous every 12 hours  hydrALAZINE 25 milliGRAM(s) Oral four times a day  losartan 50 milliGRAM(s) Oral daily  metoprolol     tartrate 25 milliGRAM(s) Oral two times a day  multivitamin 1 Tablet(s) Oral daily  pantoprazole    Tablet 40 milliGRAM(s) Oral before breakfast  predniSONE   Tablet 40 milliGRAM(s) Oral daily  sertraline 100 milliGRAM(s) Oral daily    MEDICATIONS  (PRN):  acetaminophen   Tablet. 650 milliGRAM(s) Oral every 6 hours PRN mild or Moderate Pain (4 - 6)  ALBUTerol    90 MICROgram(s) HFA Inhaler 2 Puff(s) Inhalation every 6 hours PRN Bronchospasm    	    PHYSICAL EXAM:  T(C): 36.8 (02-15-18 @ 06:09), Max: 37.3 (02-14-18 @ 14:15)  HR: 66 (02-15-18 @ 06:09) (61 - 74)  BP: 157/55 (02-15-18 @ 06:09) (155/50 - 181/53)  RR: 18 (02-15-18 @ 06:09) (17 - 18)  SpO2: 100% (02-15-18 @ 06:09) (98% - 100%)  Wt(kg): --  I&O's Summary    14 Feb 2018 07:01  -  15 Feb 2018 07:00  --------------------------------------------------------  IN: 500 mL / OUT: 550 mL / NET: -50 mL    15 Feb 2018 07:01  -  15 Feb 2018 12:01  --------------------------------------------------------  IN: 200 mL / OUT: 0 mL / NET: 200 mL      Appearance: Normal	  HEENT:   NCAT, PERRL, EOMI	  Lymphatic: No lymphadenopathy  Cardiovascular: Normal S1 S2, RRR  Respiratory: Lungs clear to auscultation BL  Psychiatry: A & O x 3, Mood & affect appropriate  Gastrointestinal:  Soft, Non-tender, + BS  Skin: No rashes, No ecchymoses, No cyanosis	  Neurologic: Non-focal  Extremities: Normal range of motion, No clubbing, cyanosis or edema    LABS:	 	    CARDIAC MARKERS:  CARDIAC MARKERS ( 13 Feb 2018 14:59 )  x     / < 0.06 ng/mL / 21 u/L / 1.14 ng/mL / x                                    9.1    8.99  )-----------( 326      ( 15 Feb 2018 06:30 )             26.4     02-15    142  |  103  |  28<H>  ----------------------------<  89  3.9   |  22  |  1.40<H>    Ca    9.1      15 Feb 2018 06:30  Phos  3.0     02-14  Mg     1.5     02-15    TPro  6.6  /  Alb  3.5  /  TBili  0.6  /  DBili  x   /  AST  19  /  ALT  22  /  AlkPhos  106  02-13    proBNP:   Lipid Profile:   HgA1c:   TSH:
Chief complaint: SOB  Interval hx: episode of epigastric/chest pain today    Allergies:  lisinopril (Anaphylaxis)  penicillin (Anaphylaxis)      PAST MEDICAL & SURGICAL HISTORY:  Congestive heart failure (CHF)  Pulmonary hypertension  Emphysema of lung  Femoral Artery Thrombosis: partiac oclusion with no inteventions as per Pt on ASA Plavix  History of Colonoscopy with Polypectomy  History of Hysterectomy  Diverticulosis of Colon  Breast Cancer: S/P Lt lumpectomy &amp; RT  Dyslipidemia  DM (Diabetes Mellitus)  HTN (Hypertension)  COPD (Chronic Obstructive Pulmonary Disease)  History of tonsillectomy  S/P lumpectomy, left breast: 1998  H/O total hysterectomy: 1999      FAMILY HISTORY:  No pertinent family history in first degree relatives      REVIEW OF SYSTEMS:  CONSTITUTIONAL: No fever, weight loss, or fatigue  EYES: No eye pain, visual disturbances, or discharge  NECK: No pain or stiffness  RESPIRATORY: No cough or wheezing, no shortness of breath  CARDIOVASCULAR: + chest pain, no palpitations, no dizziness, or leg swelling  GASTROINTESTINAL: No abdominal, + epigastric pain. No nausea, vomiting, diarrhea or constipation  GENITOURINARY: No dysuria, urinary frequency or urgency, no hematuria  NEUROLOGICAL: No headaches, memory loss, loss of strength, numbness, or tremors  SKIN: No itching, burning, rashes, or lesions   MUSCULOSKELETAL: No joint pain or swelling; No muscle, back, or extremity pain      Medications:  MEDICATIONS  (STANDING):  amLODIPine   Tablet 10 milliGRAM(s) Oral daily  aspirin 325 milliGRAM(s) Oral daily  atorvastatin 20 milliGRAM(s) Oral at bedtime  aztreonam  IVPB 1000 milliGRAM(s) IV Intermittent every 12 hours  buDESOnide 160 MICROgram(s)/formoterol 4.5 MICROgram(s) Inhaler 2 Puff(s) Inhalation two times a day  cholecalciferol 1000 Unit(s) Oral daily  cinacalcet 30 milliGRAM(s) Oral <User Schedule>  ferrous    sulfate 325 milliGRAM(s) Oral daily  furosemide    Tablet 20 milliGRAM(s) Oral every 48 hours  heparin  Injectable 5000 Unit(s) SubCutaneous every 12 hours  hydrALAZINE 75 milliGRAM(s) Oral three times a day  isosorbide   mononitrate ER Tablet (IMDUR) 60 milliGRAM(s) Oral daily  loratadine 10 milliGRAM(s) Oral daily  melatonin 3 milliGRAM(s) Oral at bedtime  metoprolol     tartrate 100 milliGRAM(s) Oral two times a day  multivitamin 1 Tablet(s) Oral daily  pantoprazole    Tablet 40 milliGRAM(s) Oral before breakfast  sertraline 100 milliGRAM(s) Oral daily  simethicone 160 milliGRAM(s) Chew every 12 hours  vancomycin  IVPB 750 milliGRAM(s) IV Intermittent every 24 hours    MEDICATIONS  (PRN):  acetaminophen   Tablet. 650 milliGRAM(s) Oral every 6 hours PRN Mild, moderate, or severe, or temp >99.0F, or at discretion of provider  ALBUTerol    90 MICROgram(s) HFA Inhaler 2 Puff(s) Inhalation every 6 hours PRN Bronchospasm  aluminum hydroxide/magnesium hydroxide/simethicone Suspension 30 milliLiter(s) Oral every 6 hours PRN Dyspepsia  benzocaine 15 mG/menthol 3.6 mG Lozenge 1 Lozenge Oral every 4 hours PRN Sore Throat    	    PHYSICAL EXAM:  T(C): 37 (02-23-18 @ 13:40), Max: 37 (02-23-18 @ 13:40)  HR: 80 (02-23-18 @ 19:03) (63 - 81)  BP: 147/44 (02-23-18 @ 17:26) (147/44 - 162/50)  RR: 18 (02-23-18 @ 13:40) (18 - 18)  SpO2: 92% (02-23-18 @ 19:03) (92% - 98%)  Wt(kg): --  I&O's Summary    22 Feb 2018 07:01 - 23 Feb 2018 07:00  --------------------------------------------------------  IN: 540 mL / OUT: 300 mL / NET: 240 mL    23 Feb 2018 07:01 - 23 Feb 2018 19:59  --------------------------------------------------------  IN: 500 mL / OUT: 400 mL / NET: 100 mL      Appearance: Normal	  HEENT:   NCAT, PERRL, EOMI	  Lymphatic: No lymphadenopathy  Cardiovascular: Normal S1 S2, RRR  Respiratory: Lungs clear to auscultation BL  Psychiatry: A & O x 3, Mood & affect appropriate  Gastrointestinal:  Soft, Non-tender, + BS  Skin: No rashes, No ecchymoses, No cyanosis	  Neurologic: Non-focal  Extremities: Normal range of motion, No clubbing, cyanosis or edema    LABS:	 	    CARDIAC MARKERS:                                7.7    22.63 )-----------( 397      ( 23 Feb 2018 05:30 )             23.7     02-23    140  |  103  |  63<H>  ----------------------------<  152<H>  4.3   |  21<L>  |  1.57<H>    Ca    9.6      23 Feb 2018 05:30  Phos  3.1     02-22  Mg     1.5     02-23      proBNP:   Lipid Profile:   HgA1c:   TSH:
Chief complaint: SOB  Interval hx: found to have RUL PNA    Allergies:  lisinopril (Anaphylaxis)  penicillin (Anaphylaxis)      PAST MEDICAL & SURGICAL HISTORY:  Congestive heart failure (CHF)  Pulmonary hypertension  Emphysema of lung  Femoral Artery Thrombosis: partiac oclusion with no inteventions as per Pt on ASA Plavix  History of Colonoscopy with Polypectomy  History of Hysterectomy  Diverticulosis of Colon  Breast Cancer: S/P Lt lumpectomy &amp; RT  Dyslipidemia  DM (Diabetes Mellitus)  HTN (Hypertension)  COPD (Chronic Obstructive Pulmonary Disease)  History of tonsillectomy  S/P lumpectomy, left breast: 1998  H/O total hysterectomy: 1999      FAMILY HISTORY:  No pertinent family history in first degree relatives      REVIEW OF SYSTEMS:  CONSTITUTIONAL: No fever, weight loss, or fatigue  EYES: No eye pain, visual disturbances, or discharge  NECK: No pain or stiffness  RESPIRATORY: No cough or wheezing, no shortness of breath  CARDIOVASCULAR: No chest pain, no palpitations, no dizziness, or leg swelling  GASTROINTESTINAL: No abdominal or epigastric pain. No nausea, vomiting, diarrhea or constipation  GENITOURINARY: No dysuria, urinary frequency or urgency, no hematuria  NEUROLOGICAL: No headaches, memory loss, loss of strength, numbness, or tremors  SKIN: No itching, burning, rashes, or lesions   MUSCULOSKELETAL: No joint pain or swelling; No muscle, back, or extremity pain      Medications:  MEDICATIONS  (STANDING):  amLODIPine   Tablet 10 milliGRAM(s) Oral daily  atorvastatin 20 milliGRAM(s) Oral at bedtime  aztreonam  IVPB 1000 milliGRAM(s) IV Intermittent every 12 hours  buDESOnide 160 MICROgram(s)/formoterol 4.5 MICROgram(s) Inhaler 2 Puff(s) Inhalation two times a day  cholecalciferol 1000 Unit(s) Oral daily  cinacalcet 30 milliGRAM(s) Oral <User Schedule>  ferrous    sulfate 325 milliGRAM(s) Oral daily  heparin  Injectable 5000 Unit(s) SubCutaneous every 12 hours  hydrALAZINE 75 milliGRAM(s) Oral three times a day  isosorbide   mononitrate ER Tablet (IMDUR) 30 milliGRAM(s) Oral daily  loratadine 10 milliGRAM(s) Oral daily  melatonin 3 milliGRAM(s) Oral at bedtime  metoprolol     tartrate 100 milliGRAM(s) Oral two times a day  multivitamin 1 Tablet(s) Oral daily  pantoprazole    Tablet 40 milliGRAM(s) Oral before breakfast  sertraline 100 milliGRAM(s) Oral daily  vancomycin  IVPB 750 milliGRAM(s) IV Intermittent every 24 hours    MEDICATIONS  (PRN):  acetaminophen   Tablet. 650 milliGRAM(s) Oral every 6 hours PRN mild or Moderate Pain (4 - 6)  ALBUTerol    90 MICROgram(s) HFA Inhaler 2 Puff(s) Inhalation every 6 hours PRN Bronchospasm  aluminum hydroxide/magnesium hydroxide/simethicone Suspension 30 milliLiter(s) Oral every 6 hours PRN Dyspepsia  benzocaine 15 mG/menthol 3.6 mG Lozenge 1 Lozenge Oral every 4 hours PRN Sore Throat    	    PHYSICAL EXAM:  T(C): 36.7 (02-21-18 @ 13:34), Max: 37.6 (02-20-18 @ 22:11)  HR: 66 (02-21-18 @ 13:34) (59 - 68)  BP: 157/49 (02-21-18 @ 13:34) (153/50 - 185/49)  RR: 17 (02-21-18 @ 13:34) (17 - 18)  SpO2: 98% (02-21-18 @ 13:34) (96% - 98%)  Wt(kg): --  I&O's Summary    20 Feb 2018 07:01  -  21 Feb 2018 07:00  --------------------------------------------------------  IN: 320 mL / OUT: 930 mL / NET: -610 mL      Appearance: Normal	  HEENT:   NCAT, PERRL, EOMI	  Lymphatic: No lymphadenopathy  Cardiovascular: Normal S1 S2, RRR  Respiratory: Lungs clear to auscultation BL  Psychiatry: A & O x 3, Mood & affect appropriate  Gastrointestinal:  Soft, Non-tender, + BS  Skin: No rashes, No ecchymoses, No cyanosis	  Neurologic: Non-focal  Extremities: Normal range of motion, No clubbing, cyanosis or edema    LABS:	 	    CARDIAC MARKERS:  CARDIAC MARKERS ( 21 Feb 2018 10:30 )  x     / < 0.06 ng/mL / 7 u/L / 1.00 ng/mL / x                                    8.2    22.16 )-----------( 394      ( 21 Feb 2018 10:30 )             24.1     02-21    140  |  103  |  60<H>  ----------------------------<  154<H>  4.5   |  20<L>  |  1.77<H>    Ca    9.6      21 Feb 2018 06:55  Phos  3.1     02-21  Mg     1.6     02-21      proBNP:   Lipid Profile:   HgA1c:   TSH:
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 as per Pt on ASA Plavix  History of Colonoscopy with Polypectomy  History of Hysterectomy  Diverticulosis of Colon  Breast Cancer: S/P Lt lumpectomy &amp; RT  Dyslipidemia  DM (Diabetes Mellitus)  HTN (Hypertension)  COPD (Chronic Obstructive Pulmonary Disease)  History of tonsillectomy  S/P lumpectomy, left breast: 1998  H/O total hysterectomy: 1999      FAMILY HISTORY:  No pertinent family history in first degree relatives      REVIEW OF SYSTEMS:  CONSTITUTIONAL: No fever, weight loss, or fatigue  EYES: No eye pain, visual disturbances, or discharge  NECK: No pain or stiffness  RESPIRATORY: No cough or wheezing, no shortness of breath  CARDIOVASCULAR: No chest pain, + palpitations, no dizziness, or leg swelling  GASTROINTESTINAL: No abdominal or epigastric pain. No nausea, vomiting, diarrhea or constipation  GENITOURINARY: No dysuria, urinary frequency or urgency, no hematuria  NEUROLOGICAL: No headaches, memory loss, loss of strength, numbness, or tremors  SKIN: No itching, burning, rashes, or lesions   MUSCULOSKELETAL: No joint pain or swelling; No muscle, back, or extremity pain      Medications:  MEDICATIONS  (STANDING):  amLODIPine   Tablet 10 milliGRAM(s) Oral daily  aspirin enteric coated 81 milliGRAM(s) Oral daily  atorvastatin 20 milliGRAM(s) Oral at bedtime  buDESOnide 160 MICROgram(s)/formoterol 4.5 MICROgram(s) Inhaler 2 Puff(s) Inhalation two times a day  cholecalciferol 1000 Unit(s) Oral daily  cinacalcet 30 milliGRAM(s) Oral <User Schedule>  clopidogrel Tablet 75 milliGRAM(s) Oral daily  ferrous    sulfate 325 milliGRAM(s) Oral daily  furosemide    Tablet 20 milliGRAM(s) Oral daily  heparin  Injectable 5000 Unit(s) SubCutaneous every 12 hours  hydrALAZINE 50 milliGRAM(s) Oral three times a day  loratadine 10 milliGRAM(s) Oral daily  melatonin 3 milliGRAM(s) Oral at bedtime  metoprolol     tartrate 75 milliGRAM(s) Oral two times a day  multivitamin 1 Tablet(s) Oral daily  pantoprazole    Tablet 40 milliGRAM(s) Oral before breakfast  predniSONE   Tablet 40 milliGRAM(s) Oral daily  sertraline 100 milliGRAM(s) Oral daily    MEDICATIONS  (PRN):  acetaminophen   Tablet. 650 milliGRAM(s) Oral every 6 hours PRN mild or Moderate Pain (4 - 6)  ALBUTerol    90 MICROgram(s) HFA Inhaler 2 Puff(s) Inhalation every 6 hours PRN Bronchospasm  aluminum hydroxide/magnesium hydroxide/simethicone Suspension 30 milliLiter(s) Oral every 6 hours PRN Dyspepsia  benzocaine 15 mG/menthol 3.6 mG Lozenge 1 Lozenge Oral every 4 hours PRN Sore Throat    	    PHYSICAL EXAM:  T(C): 36.3 (02-18-18 @ 12:24), Max: 36.4 (02-18-18 @ 05:43)  HR: 60 (02-18-18 @ 17:06) (60 - 67)  BP: 159/50 (02-18-18 @ 17:06) (159/50 - 210/70)  RR: 18 (02-18-18 @ 12:24) (18 - 18)  SpO2: 98% (02-18-18 @ 12:24) (98% - 98%)  Wt(kg): --  I&O's Summary    17 Feb 2018 07:01  -  18 Feb 2018 07:00  --------------------------------------------------------  IN: 225 mL / OUT: 550 mL / NET: -325 mL      Appearance: Normal	  HEENT:   NCAT, PERRL, EOMI	  Lymphatic: No lymphadenopathy  Cardiovascular: Normal S1 S2, RRR  Respiratory: Lungs clear to auscultation BL  Psychiatry: A & O x 3, Mood & affect appropriate  Gastrointestinal:  Soft, Non-tender, + BS  Skin: No rashes, No ecchymoses, No cyanosis	  Neurologic: Non-focal  Extremities: Normal range of motion, No clubbing, cyanosis or edema    LABS:	 	    CARDIAC MARKERS:                                8.8    18.86 )-----------( 393      ( 18 Feb 2018 07:14 )             25.6     02-18    142  |  106  |  61<H>  ----------------------------<  106<H>  4.1   |  23  |  1.58<H>    Ca    9.4      18 Feb 2018 07:14  Mg     1.7     02-18      proBNP:   Lipid Profile:   HgA1c:   TSH:
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 as per Pt on ASA Plavix  History of Colonoscopy with Polypectomy  History of Hysterectomy  Diverticulosis of Colon  Breast Cancer: S/P Lt lumpectomy &amp; RT  Dyslipidemia  DM (Diabetes Mellitus)  HTN (Hypertension)  COPD (Chronic Obstructive Pulmonary Disease)  History of tonsillectomy  S/P lumpectomy, left breast: 1998  H/O total hysterectomy: 1999      FAMILY HISTORY:  No pertinent family history in first degree relatives      REVIEW OF SYSTEMS:  CONSTITUTIONAL: No fever, weight loss, or fatigue  EYES: No eye pain, visual disturbances, or discharge  NECK: No pain or stiffness  RESPIRATORY: No cough or wheezing, no shortness of breath  CARDIOVASCULAR: No chest pain, no palpitations, no dizziness, or leg swelling  GASTROINTESTINAL: No abdominal or epigastric pain. No nausea, vomiting, diarrhea or constipation  GENITOURINARY: No dysuria, urinary frequency or urgency, no hematuria  NEUROLOGICAL: No headaches, memory loss, loss of strength, numbness, or tremors  SKIN: No itching, burning, rashes, or lesions   MUSCULOSKELETAL: No joint pain or swelling; No muscle, back, or extremity pain      Medications:  MEDICATIONS  (STANDING):  amLODIPine   Tablet 10 milliGRAM(s) Oral daily  aspirin enteric coated 81 milliGRAM(s) Oral daily  atorvastatin 20 milliGRAM(s) Oral at bedtime  buDESOnide 160 MICROgram(s)/formoterol 4.5 MICROgram(s) Inhaler 2 Puff(s) Inhalation two times a day  cholecalciferol 1000 Unit(s) Oral daily  cinacalcet 30 milliGRAM(s) Oral <User Schedule>  clopidogrel Tablet 75 milliGRAM(s) Oral daily  ferrous    sulfate 325 milliGRAM(s) Oral daily  furosemide    Tablet 20 milliGRAM(s) Oral daily  heparin  Injectable 5000 Unit(s) SubCutaneous every 12 hours  hydrALAZINE 50 milliGRAM(s) Oral three times a day  loratadine 10 milliGRAM(s) Oral daily  melatonin 3 milliGRAM(s) Oral at bedtime  metoprolol     tartrate 75 milliGRAM(s) Oral two times a day  multivitamin 1 Tablet(s) Oral daily  pantoprazole    Tablet 40 milliGRAM(s) Oral before breakfast  sertraline 100 milliGRAM(s) Oral daily    MEDICATIONS  (PRN):  acetaminophen   Tablet. 650 milliGRAM(s) Oral every 6 hours PRN mild or Moderate Pain (4 - 6)  ALBUTerol    90 MICROgram(s) HFA Inhaler 2 Puff(s) Inhalation every 6 hours PRN Bronchospasm  aluminum hydroxide/magnesium hydroxide/simethicone Suspension 30 milliLiter(s) Oral every 6 hours PRN Dyspepsia  benzocaine 15 mG/menthol 3.6 mG Lozenge 1 Lozenge Oral every 4 hours PRN Sore Throat    	    PHYSICAL EXAM:  T(C): 36.6 (02-19-18 @ 05:13), Max: 36.6 (02-18-18 @ 22:19)  HR: 68 (02-19-18 @ 05:13) (58 - 68)  BP: 163/50 (02-19-18 @ 05:13) (159/50 - 198/63)  RR: 18 (02-19-18 @ 05:13) (18 - 18)  SpO2: 98% (02-19-18 @ 05:13) (98% - 99%)  Wt(kg): --  I&O's Summary    18 Feb 2018 07:01  -  19 Feb 2018 07:00  --------------------------------------------------------  IN: 250 mL / OUT: 450 mL / NET: -200 mL        Appearance: Normal	  HEENT:   NCAT, PERRL, EOMI	  Lymphatic: No lymphadenopathy  Cardiovascular: Normal S1 S2, RRR  Respiratory: Lungs clear to auscultation BL  Psychiatry: A & O x 3, Mood & affect appropriate  Gastrointestinal:  Soft, Non-tender, + BS  Skin: No rashes, No ecchymoses, No cyanosis	  Neurologic: Non-focal  Extremities: Normal range of motion, No clubbing, cyanosis or edema    LABS:	 	    CARDIAC MARKERS:                                8.1    18.05 )-----------( 387      ( 19 Feb 2018 06:51 )             24.8     02-19    141  |  105  |  53<H>  ----------------------------<  129<H>  4.4   |  22  |  1.50<H>    Ca    9.5      19 Feb 2018 06:51  Mg     1.6     02-19      proBNP:   Lipid Profile:   HgA1c:   TSH:
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 as per Pt on ASA Plavix  History of Colonoscopy with Polypectomy  History of Hysterectomy  Diverticulosis of Colon  Breast Cancer: S/P Lt lumpectomy &amp; RT  Dyslipidemia  DM (Diabetes Mellitus)  HTN (Hypertension)  COPD (Chronic Obstructive Pulmonary Disease)  History of tonsillectomy  S/P lumpectomy, left breast: 1998  H/O total hysterectomy: 1999      FAMILY HISTORY:  No pertinent family history in first degree relatives      REVIEW OF SYSTEMS:  CONSTITUTIONAL: No fever, weight loss, or fatigue  EYES: No eye pain, visual disturbances, or discharge  NECK: No pain or stiffness  RESPIRATORY: No cough or wheezing, no shortness of breath  CARDIOVASCULAR: No chest pain, no palpitations, no dizziness, or leg swelling  GASTROINTESTINAL: No abdominal or epigastric pain. No nausea, vomiting, diarrhea or constipation  GENITOURINARY: No dysuria, urinary frequency or urgency, no hematuria  NEUROLOGICAL: No headaches, memory loss, loss of strength, numbness, or tremors  SKIN: No itching, burning, rashes, or lesions   MUSCULOSKELETAL: No joint pain or swelling; No muscle, back, or extremity pain      Medications:  MEDICATIONS  (STANDING):  amLODIPine   Tablet 10 milliGRAM(s) Oral daily  aspirin enteric coated 81 milliGRAM(s) Oral daily  atorvastatin 20 milliGRAM(s) Oral at bedtime  buDESOnide 160 MICROgram(s)/formoterol 4.5 MICROgram(s) Inhaler 2 Puff(s) Inhalation two times a day  cholecalciferol 1000 Unit(s) Oral daily  cinacalcet 30 milliGRAM(s) Oral <User Schedule>  clopidogrel Tablet 75 milliGRAM(s) Oral daily  ferrous    sulfate 325 milliGRAM(s) Oral daily  furosemide    Tablet 20 milliGRAM(s) Oral daily  heparin  Injectable 5000 Unit(s) SubCutaneous every 12 hours  hydrALAZINE 75 milliGRAM(s) Oral three times a day  isosorbide   mononitrate ER Tablet (IMDUR) 30 milliGRAM(s) Oral daily  loratadine 10 milliGRAM(s) Oral daily  melatonin 3 milliGRAM(s) Oral at bedtime  metoprolol     tartrate 75 milliGRAM(s) Oral two times a day  multivitamin 1 Tablet(s) Oral daily  pantoprazole    Tablet 40 milliGRAM(s) Oral before breakfast  sertraline 100 milliGRAM(s) Oral daily    MEDICATIONS  (PRN):  acetaminophen   Tablet. 650 milliGRAM(s) Oral every 6 hours PRN mild or Moderate Pain (4 - 6)  ALBUTerol    90 MICROgram(s) HFA Inhaler 2 Puff(s) Inhalation every 6 hours PRN Bronchospasm  aluminum hydroxide/magnesium hydroxide/simethicone Suspension 30 milliLiter(s) Oral every 6 hours PRN Dyspepsia  benzocaine 15 mG/menthol 3.6 mG Lozenge 1 Lozenge Oral every 4 hours PRN Sore Throat    	    PHYSICAL EXAM:  T(C): 36.4 (02-20-18 @ 05:27), Max: 36.9 (02-19-18 @ 21:03)  HR: 58 (02-20-18 @ 05:27) (49 - 66)  BP: 157/55 (02-20-18 @ 05:27) (157/55 - 193/55)  RR: 16 (02-20-18 @ 05:27) (16 - 17)  SpO2: 98% (02-20-18 @ 05:27) (97% - 98%)  Wt(kg): --  I&O's Summary    19 Feb 2018 07:01  -  20 Feb 2018 07:00  --------------------------------------------------------  IN: 535 mL / OUT: 725 mL / NET: -190 mL    20 Feb 2018 07:01  -  20 Feb 2018 10:27  --------------------------------------------------------  IN: 200 mL / OUT: 240 mL / NET: -40 mL      Appearance: Normal	  HEENT:   NCAT, PERRL, EOMI	  Lymphatic: No lymphadenopathy  Cardiovascular: Normal S1 S2, RRR  Respiratory: Lungs clear to auscultation BL  Psychiatry: A & O x 3, Mood & affect appropriate  Gastrointestinal:  Soft, Non-tender, + BS  Skin: No rashes, No ecchymoses, No cyanosis	  Neurologic: Non-focal  Extremities: Normal range of motion, No clubbing, cyanosis or edema    LABS:	 	    CARDIAC MARKERS:                                8.1    18.05 )-----------( 387      ( 19 Feb 2018 06:51 )             24.8     02-19    141  |  105  |  53<H>  ----------------------------<  129<H>  4.4   |  22  |  1.50<H>    Ca    9.5      19 Feb 2018 06:51  Mg     1.6     02-19      proBNP:   Lipid Profile:   HgA1c:   TSH:
[facility] NEPHROLOGY- PROGRESS NOTE    84 female with history of CHF presents with SOB. Nephrology consulted for proteinuria.    REVIEW OF SYSTEMS:  Gen: no changes in weight  Cards: no chest pain  Resp: + dyspnea on exertion  GI: no nausea or vomiting or diarrhea  Vascular: no LE edema      Hospital Medications: Medications reviewed    VITALS:  T(F): 97.9 (02-21-18 @ 05:48), Max: 99.6 (02-20-18 @ 22:11)  HR: 63 (02-21-18 @ 05:48)  BP: 153/50 (02-21-18 @ 05:48)  RR: 18 (02-21-18 @ 05:48)  SpO2: 98% (02-21-18 @ 05:48)  Wt(kg): --    02-20 @ 07:01  -  02-21 @ 07:00  --------------------------------------------------------  IN: 320 mL / OUT: 930 mL / NET: -610 mL      PHYSICAL EXAM:    Gen: NAD, calm  Cards: RRR, +S1/S2, + BENNIE  Resp: CTA B/L  GI: soft, NT/ND, NABS  Vascular: no LE edema B/L      LABS:  02-21    140  |  103  |  60<H>  ----------------------------<  154<H>  4.5   |  20<L>  |  1.77<H>    Ca    9.6      21 Feb 2018 06:55  Phos  3.1     02-21  Mg     1.6     02-21      Creatinine Trend: 1.77 <--, 1.79 <--, 1.50 <--, 1.58 <--, 1.67 <--, 1.53 <--, 1.40 <--                        7.7    22.22 )-----------( 381      ( 21 Feb 2018 06:55 )             23.1      < from: Xray Chest 1 View- PORTABLE-Urgent (02.21.18 @ 10:27) >  IMPRESSION: Patchy opacity in the left upper lung field, suspicious for   infection.    < end of copied text >
Follow-up Pulm Progress Note    Chest pressure now intermittent  c/o episodes of sob, chavez  states this am went to [place] and became diaphoretic       Admit Diagnosis:  Other form of dyspnea      Past Medical & Surgical History:  Congestive heart failure (CHF)  Pulmonary hypertension  Emphysema of lung  Femoral Artery Thrombosis  History of Colonoscopy with Polypectomy  History of Hysterectomy  Diverticulosis of Colon  Breast Cancer  Dyslipidemia  DM (Diabetes Mellitus)  HTN (Hypertension)  COPD (Chronic Obstructive Pulmonary Disease)  History of tonsillectomy  S/P lumpectomy, left breast  H/O total hysterectomy      Medications:  MEDICATIONS  (STANDING):  amLODIPine   Tablet 10 milliGRAM(s) Oral daily  aspirin 325 milliGRAM(s) Oral daily  atorvastatin 20 milliGRAM(s) Oral at bedtime  aztreonam  IVPB 1000 milliGRAM(s) IV Intermittent every 12 hours  buDESOnide 160 MICROgram(s)/formoterol 4.5 MICROgram(s) Inhaler 2 Puff(s) Inhalation two times a day  cholecalciferol 1000 Unit(s) Oral daily  cinacalcet 30 milliGRAM(s) Oral <User Schedule>  ferrous    sulfate 325 milliGRAM(s) Oral daily  furosemide    Tablet 20 milliGRAM(s) Oral every 48 hours  heparin  Injectable 5000 Unit(s) SubCutaneous every 12 hours  hydrALAZINE 75 milliGRAM(s) Oral three times a day  isosorbide   mononitrate ER Tablet (IMDUR) 60 milliGRAM(s) Oral daily  loratadine 10 milliGRAM(s) Oral daily  magnesium sulfate  IVPB 2 Gram(s) IV Intermittent once  melatonin 3 milliGRAM(s) Oral at bedtime  metoprolol     tartrate 100 milliGRAM(s) Oral two times a day  multivitamin 1 Tablet(s) Oral daily  pantoprazole    Tablet 40 milliGRAM(s) Oral before breakfast  sertraline 100 milliGRAM(s) Oral daily  simethicone 160 milliGRAM(s) Chew every 12 hours  vancomycin  IVPB 750 milliGRAM(s) IV Intermittent every 24 hours    MEDICATIONS  (PRN):  acetaminophen   Tablet. 650 milliGRAM(s) Oral every 6 hours PRN Mild, moderate, or severe, or temp >99.0F, or at discretion of provider  ALBUTerol    90 MICROgram(s) HFA Inhaler 2 Puff(s) Inhalation every 6 hours PRN Bronchospasm  aluminum hydroxide/magnesium hydroxide/simethicone Suspension 30 milliLiter(s) Oral every 6 hours PRN Dyspepsia  benzocaine 15 mG/menthol 3.6 mG Lozenge 1 Lozenge Oral every 4 hours PRN Sore Throat      Vent settings (if applicable)      Vital Signs Last 24 Hrs  T(C): 36.7 (24 Feb 2018 05:55), Max: 37 (23 Feb 2018 13:40)  T(F): 98.1 (24 Feb 2018 05:55), Max: 98.6 (23 Feb 2018 13:40)  HR: 77 (24 Feb 2018 05:55) (63 - 81)  BP: 143/93 (24 Feb 2018 05:55) (143/93 - 154/45)  BP(mean): --  RR: 18 (24 Feb 2018 05:55) (18 - 18)  SpO2: 98% (24 Feb 2018 05:55) (92% - 98%)          02-23 @ 07:01  -  02-24 @ 07:00  --------------------------------------------------------  IN: 500 mL / OUT: 400 mL / NET: 100 mL          LABS:                        7.1    26.39 )-----------( 460      ( 24 Feb 2018 07:45 )             21.7     02-24    140  |  101  |  73<H>  ----------------------------<  145<H>  4.7   |  21<L>  |  1.54<H>    Ca    9.3      24 Feb 2018 07:45  Mg     1.3     02-24          < from: Xray Chest 1 View- PORTABLE-Routine (02.23.18 @ 07:34) >  IMPRESSION: Patchy opacities in the left upper lobe consistent with   pneumonia are increased from prior.      < end of copied text >
Follow-up Pulm Progress Note    diarrhea improved  complain of SOB          Admit Diagnosis:  Other form of dyspnea      Past Medical & Surgical History:  Congestive heart failure (CHF)  Pulmonary hypertension  Emphysema of lung  Femoral Artery Thrombosis  History of Colonoscopy with Polypectomy  History of Hysterectomy  Diverticulosis of Colon  Breast Cancer  Dyslipidemia  DM (Diabetes Mellitus)  HTN (Hypertension)  COPD (Chronic Obstructive Pulmonary Disease)  History of tonsillectomy  S/P lumpectomy, left breast  H/O total hysterectomy      Medications:  MEDICATIONS  (STANDING):  amLODIPine   Tablet 10 milliGRAM(s) Oral daily  aspirin 325 milliGRAM(s) Oral daily  atorvastatin 20 milliGRAM(s) Oral at bedtime  aztreonam  IVPB 1000 milliGRAM(s) IV Intermittent every 12 hours  buDESOnide 160 MICROgram(s)/formoterol 4.5 MICROgram(s) Inhaler 2 Puff(s) Inhalation two times a day  cholecalciferol 1000 Unit(s) Oral daily  cinacalcet 30 milliGRAM(s) Oral <User Schedule>  ferrous    sulfate 325 milliGRAM(s) Oral daily  furosemide    Tablet 20 milliGRAM(s) Oral every 48 hours  heparin  Injectable 5000 Unit(s) SubCutaneous every 12 hours  hydrALAZINE 75 milliGRAM(s) Oral three times a day  loratadine 10 milliGRAM(s) Oral daily  melatonin 3 milliGRAM(s) Oral at bedtime  metoprolol     tartrate 100 milliGRAM(s) Oral two times a day  multivitamin 1 Tablet(s) Oral daily  pantoprazole    Tablet 40 milliGRAM(s) Oral before breakfast  sertraline 100 milliGRAM(s) Oral daily  vancomycin  IVPB 750 milliGRAM(s) IV Intermittent every 24 hours    MEDICATIONS  (PRN):  acetaminophen   Tablet. 650 milliGRAM(s) Oral every 6 hours PRN mild or Moderate Pain (4 - 6)  ALBUTerol    90 MICROgram(s) HFA Inhaler 2 Puff(s) Inhalation every 6 hours PRN Bronchospasm  aluminum hydroxide/magnesium hydroxide/simethicone Suspension 30 milliLiter(s) Oral every 6 hours PRN Dyspepsia  benzocaine 15 mG/menthol 3.6 mG Lozenge 1 Lozenge Oral every 4 hours PRN Sore Throat      Vent settings (if applicable)      Vital Signs Last 24 Hrs  T(C): 36.9 (22 Feb 2018 06:37), Max: 36.9 (22 Feb 2018 06:37)  T(F): 98.4 (22 Feb 2018 06:37), Max: 98.4 (22 Feb 2018 06:37)  HR: 65 (22 Feb 2018 07:52) (63 - 75)  BP: 144/62 (22 Feb 2018 07:52) (144/62 - 162/50)  BP(mean): --  RR: 18 (22 Feb 2018 06:37) (17 - 18)  SpO2: 98% (22 Feb 2018 06:37) (98% - 98%)          02-21 @ 07:01  -  02-22 @ 07:00  --------------------------------------------------------  IN: 0 mL / OUT: 750 mL / NET: -750 mL          LABS:                        7.7    26.33 )-----------( 424      ( 22 Feb 2018 06:22 )             23.9     02-22    140  |  102  |  59<H>  ----------------------------<  129<H>  4.7   |  18<L>  |  1.71<H>    Ca    9.7      22 Feb 2018 06:22  Phos  3.1     02-22  Mg     1.6     02-22        CARDIAC MARKERS ( 21 Feb 2018 10:30 )  x     / < 0.06 ng/mL / 7 u/L / 1.00 ng/mL / x          CAPILLARY BLOOD GLUCOSE            Procalcitonin, Serum: 0.29 ng/mL (02-21-18 @ 06:55)        CULTURES:        Physical Examination:  GEN:  Alert, Verbal, Oriented, NAD  HEENT: N/C A/T No JVD, Dry MM  PULM: B/L air entry, mild b.l wheezing, decreased bs at bases, rales on right new  CVS: Regular rate and rhythm, + murmurs  ABD: Soft, NT, ND + BS  EXT: No C/C/E    RADIOLOGY REVIEWED    CXR:  < from: Xray Chest 1 View- PORTABLE-Urgent (02.21.18 @ 10:27) >  atchy opacity in the left upper lung field, suspicious for   infection.      < end of copied text >      < from: Xray Chest 1 View- PORTABLE-Routine (02.20.18 @ 08:20) >  Accentuation and indistinctness of pulmonary vascular   markings, greater on the left, as well as some left airspace opacity,   findings which may be attributable to asymmetric pulmonary edema. Limited   comparison with prior CT scan due to differing technique however   left-sided findings may be slightly increased. Superimposed infection,   particularly on the left, is not excluded.    Small left pleural effusion.    < end of copied text >    CT chest:  < from: CT Chest No Cont (02.17.18 @ 09:45) >  The findings are most compatible with resolving pulmonary edema   rather than infection or interstitial lung disease. Follow-up is   recommended to confirm resolution in 4 weeks.      < end of copied text >  < from: CT Chest No Cont (02.17.18 @ 09:45) >  The findings are most compatible with resolving pulmonary edema   rather than infection or interstitial lung disease. Follow-up is   recommended to confirm resolution in 4 weeks.      < end of copied text >    TTE:    < from: Transthoracic Echocardiogram (02.15.18 @ 13:58) >  1. Mitral annular calcification, otherwise normal mitral  valve. Mild mitral regurgitation.  2. Aortic valve leaflet morphology not well visualized.  Peak transaortic valve gradient equals 70 mm Hg, mean  transaortic valve gradient equals 41 mm Hg.  Despite the  transaortic gradients, the gross appearance of the valve is  not consistent with severe aortic stenosis.  Cannot exclude  the presence of subvalvular LVOT obstruction (ie. subaortic  membrane). Mild-moderate aortic regurgitation.  3. Severely dilated left atrium.  LA volume index = 65  cc/m2.  4. Normal left ventricular internal dimensions and wall  thicknesses.  5. Normal left ventricular systolic function. No segmental  wall motion abnormalities.  6. Normal right ventricular size and function.  Consider SUNDAR for further evaluation of the aortic valve and  possible causes of LVOT obstruction, if clinically  indicated.    < end of copied text >
Follow-up Pulm Progress Note    pt complaining of increasing SOB and fatigue.  + chest pressure, sternal and difficult to catch breath.    EKG obtained, unchanged.        Admit Diagnosis:  Other form of dyspnea      Past Medical & Surgical History:  Congestive heart failure (CHF)  Pulmonary hypertension  Emphysema of lung  Femoral Artery Thrombosis  History of Colonoscopy with Polypectomy  History of Hysterectomy  Diverticulosis of Colon  Breast Cancer  Dyslipidemia  DM (Diabetes Mellitus)  HTN (Hypertension)  COPD (Chronic Obstructive Pulmonary Disease)  History of tonsillectomy  S/P lumpectomy, left breast  H/O total hysterectomy      Medications:  MEDICATIONS  (STANDING):  amLODIPine   Tablet 10 milliGRAM(s) Oral daily  aspirin 325 milliGRAM(s) Oral daily  atorvastatin 20 milliGRAM(s) Oral at bedtime  aztreonam  IVPB 1000 milliGRAM(s) IV Intermittent every 12 hours  buDESOnide 160 MICROgram(s)/formoterol 4.5 MICROgram(s) Inhaler 2 Puff(s) Inhalation two times a day  cholecalciferol 1000 Unit(s) Oral daily  cinacalcet 30 milliGRAM(s) Oral <User Schedule>  ferrous    sulfate 325 milliGRAM(s) Oral daily  furosemide    Tablet 20 milliGRAM(s) Oral every 48 hours  heparin  Injectable 5000 Unit(s) SubCutaneous every 12 hours  hydrALAZINE 75 milliGRAM(s) Oral three times a day  isosorbide   mononitrate ER Tablet (IMDUR) 60 milliGRAM(s) Oral daily  loratadine 10 milliGRAM(s) Oral daily  melatonin 3 milliGRAM(s) Oral at bedtime  metoprolol     tartrate 100 milliGRAM(s) Oral two times a day  multivitamin 1 Tablet(s) Oral daily  pantoprazole    Tablet 40 milliGRAM(s) Oral before breakfast  sertraline 100 milliGRAM(s) Oral daily  vancomycin  IVPB 750 milliGRAM(s) IV Intermittent every 24 hours    MEDICATIONS  (PRN):  acetaminophen   Tablet. 650 milliGRAM(s) Oral every 6 hours PRN Mild, moderate, or severe, or temp >99.0F, or at discretion of provider  ALBUTerol    90 MICROgram(s) HFA Inhaler 2 Puff(s) Inhalation every 6 hours PRN Bronchospasm  aluminum hydroxide/magnesium hydroxide/simethicone Suspension 30 milliLiter(s) Oral every 6 hours PRN Dyspepsia  benzocaine 15 mG/menthol 3.6 mG Lozenge 1 Lozenge Oral every 4 hours PRN Sore Throat      Vent settings (if applicable)      Vital Signs Last 24 Hrs  T(C): 36.7 (23 Feb 2018 06:23), Max: 36.8 (22 Feb 2018 13:20)  T(F): 98 (23 Feb 2018 06:23), Max: 98.3 (22 Feb 2018 13:20)  HR: 70 (23 Feb 2018 06:23) (61 - 70)  BP: 157/56 (23 Feb 2018 06:23) (155/48 - 170/48)  BP(mean): --  RR: 18 (23 Feb 2018 06:23) (17 - 18)  SpO2: 98% (23 Feb 2018 06:23) (96% - 98%)          02-22 @ 07:01  -  02-23 @ 07:00  --------------------------------------------------------  IN: 540 mL / OUT: 300 mL / NET: 240 mL          LABS:                        7.7    22.63 )-----------( 397      ( 23 Feb 2018 05:30 )             23.7     02-23    140  |  103  |  63<H>  ----------------------------<  152<H>  4.3   |  21<L>  |  1.57<H>    Ca    9.6      23 Feb 2018 05:30  Phos  3.1     02-22  Mg     1.5     02-23        CARDIAC MARKERS ( 21 Feb 2018 10:30 )  x     / < 0.06 ng/mL / 7 u/L / 1.00 ng/mL / x            Physical Examination:  GEN:  Alert, Verbal, Oriented, NAD  HEENT: N/C A/T No JVD, Dry MM  PULM: B/L air entry, no  wheezing, decreased bs at bases, rales on right upper   CVS: Regular rate and rhythm, + murmurs  ABD: Soft, NT, ND + BS  EXT: No C/C/E    RADIOLOGY REVIEWED    CXR:  < from: Xray Chest 1 View- PORTABLE-Urgent (02.21.18 @ 10:27) >  patchy opacity in the left upper lung field, suspicious for  infection.      < end of copied text >      CT chest:  < from: CT Chest No Cont (02.17.18 @ 09:45) >  The findings are most compatible with resolving pulmonary edema   rather than infection or interstitial lung disease. Follow-up is   recommended to confirm resolution in 4 weeks.      < end of copied text >  < from: CT Chest No Cont (02.17.18 @ 09:45) >  The findings are most compatible with resolving pulmonary edema   rather than infection or interstitial lung disease. Follow-up is   recommended to confirm resolution in 4 weeks.      < end of copied text >    TTE:    < from: Transthoracic Echocardiogram (02.15.18 @ 13:58) >  1. Mitral annular calcification, otherwise normal mitral  valve. Mild mitral regurgitation.  2. Aortic valve leaflet morphology not well visualized.  Peak transaortic valve gradient equals 70 mm Hg, mean  transaortic valve gradient equals 41 mm Hg.  Despite the  transaortic gradients, the gross appearance of the valve is  not consistent with severe aortic stenosis.  Cannot exclude  the presence of subvalvular LVOT obstruction (ie. subaortic  membrane). Mild-moderate aortic regurgitation.  3. Severely dilated left atrium.  LA volume index = 65  cc/m2.  4. Normal left ventricular internal dimensions and wall  thicknesses.  5. Normal left ventricular systolic function. No segmental  wall motion abnormalities.  6. Normal right ventricular size and function.  Consider SUNDAR for further evaluation of the aortic valve and  possible causes of LVOT obstruction, if clinically  indicated.    < end of copied text >
Follow-up Pulm Progress Note  Pulmonary covering Dr. Saucedo    pt complain of diarrhea watery multiple episodes   and increasing SOB  cxr reviewed      Admit Diagnosis:  Other form of dyspnea      Past Medical & Surgical History:  Congestive heart failure (CHF)  Pulmonary hypertension  Emphysema of lung  Femoral Artery Thrombosis  History of Colonoscopy with Polypectomy  History of Hysterectomy  Diverticulosis of Colon  Breast Cancer  Dyslipidemia  DM (Diabetes Mellitus)  HTN (Hypertension)  COPD (Chronic Obstructive Pulmonary Disease)  History of tonsillectomy  S/P lumpectomy, left breast  H/O total hysterectomy      Medications:  MEDICATIONS  (STANDING):  amLODIPine   Tablet 10 milliGRAM(s) Oral daily  atorvastatin 20 milliGRAM(s) Oral at bedtime  buDESOnide 160 MICROgram(s)/formoterol 4.5 MICROgram(s) Inhaler 2 Puff(s) Inhalation two times a day  cholecalciferol 1000 Unit(s) Oral daily  cinacalcet 30 milliGRAM(s) Oral <User Schedule>  ferrous    sulfate 325 milliGRAM(s) Oral daily  heparin  Injectable 5000 Unit(s) SubCutaneous every 12 hours  hydrALAZINE 75 milliGRAM(s) Oral three times a day  isosorbide   mononitrate ER Tablet (IMDUR) 30 milliGRAM(s) Oral daily  loratadine 10 milliGRAM(s) Oral daily  melatonin 3 milliGRAM(s) Oral at bedtime  metoprolol     tartrate 100 milliGRAM(s) Oral two times a day  multivitamin 1 Tablet(s) Oral daily  pantoprazole    Tablet 40 milliGRAM(s) Oral before breakfast  sertraline 100 milliGRAM(s) Oral daily    MEDICATIONS  (PRN):  acetaminophen   Tablet. 650 milliGRAM(s) Oral every 6 hours PRN mild or Moderate Pain (4 - 6)  ALBUTerol    90 MICROgram(s) HFA Inhaler 2 Puff(s) Inhalation every 6 hours PRN Bronchospasm  aluminum hydroxide/magnesium hydroxide/simethicone Suspension 30 milliLiter(s) Oral every 6 hours PRN Dyspepsia  benzocaine 15 mG/menthol 3.6 mG Lozenge 1 Lozenge Oral every 4 hours PRN Sore Throat      Vent settings (if applicable)      Vital Signs Last 24 Hrs  T(C): 36.6 (21 Feb 2018 05:48), Max: 37.6 (20 Feb 2018 22:11)  T(F): 97.9 (21 Feb 2018 05:48), Max: 99.6 (20 Feb 2018 22:11)  HR: 63 (21 Feb 2018 05:48) (56 - 68)  BP: 153/50 (21 Feb 2018 05:48) (153/50 - 185/49)  BP(mean): 63 (21 Feb 2018 05:48) (63 - 80)  RR: 18 (21 Feb 2018 05:48) (18 - 18)  SpO2: 98% (21 Feb 2018 05:48) (96% - 98%)          02-20 @ 07:01  -  02-21 @ 07:00  --------------------------------------------------------  IN: 320 mL / OUT: 930 mL / NET: -610 mL          LABS:                        8.2    22.16 )-----------( 394      ( 21 Feb 2018 10:30 )             24.1     02-21    140  |  103  |  60<H>  ----------------------------<  154<H>  4.5   |  20<L>  |  1.77<H>    Ca    9.6      21 Feb 2018 06:55  Phos  3.1     02-21  Mg     1.6     02-21            CAPILLARY BLOOD GLUCOSE            Procalcitonin, Serum: 0.29 ng/mL (02-21-18 @ 06:55)        CULTURES:        Physical Examination:  GEN:  Alert, Verbal, Oriented, NAD  HEENT: N/C A/T No JVD, Dry MM  PULM: B/L air entry, mild b.l wheezing, decreased bs at bases, rales on right new  CVS: Regular rate and rhythm, + murmurs  ABD: Soft, NT, ND + BS  EXT: No C/C/E    RADIOLOGY REVIEWED    CXR:  < from: Xray Chest 1 View- PORTABLE-Urgent (02.21.18 @ 10:27) >  atchy opacity in the left upper lung field, suspicious for   infection.      < end of copied text >      < from: Xray Chest 1 View- PORTABLE-Routine (02.20.18 @ 08:20) >  Accentuation and indistinctness of pulmonary vascular   markings, greater on the left, as well as some left airspace opacity,   findings which may be attributable to asymmetric pulmonary edema. Limited   comparison with prior CT scan due to differing technique however   left-sided findings may be slightly increased. Superimposed infection,   particularly on the left, is not excluded.    Small left pleural effusion.    < end of copied text >    CT chest:  < from: CT Chest No Cont (02.17.18 @ 09:45) >  The findings are most compatible with resolving pulmonary edema   rather than infection or interstitial lung disease. Follow-up is   recommended to confirm resolution in 4 weeks.      < end of copied text >  < from: CT Chest No Cont (02.17.18 @ 09:45) >  The findings are most compatible with resolving pulmonary edema   rather than infection or interstitial lung disease. Follow-up is   recommended to confirm resolution in 4 weeks.      < end of copied text >    TTE:    < from: Transthoracic Echocardiogram (02.15.18 @ 13:58) >  1. Mitral annular calcification, otherwise normal mitral  valve. Mild mitral regurgitation.  2. Aortic valve leaflet morphology not well visualized.  Peak transaortic valve gradient equals 70 mm Hg, mean  transaortic valve gradient equals 41 mm Hg.  Despite the  transaortic gradients, the gross appearance of the valve is  not consistent with severe aortic stenosis.  Cannot exclude  the presence of subvalvular LVOT obstruction (ie. subaortic  membrane). Mild-moderate aortic regurgitation.  3. Severely dilated left atrium.  LA volume index = 65  cc/m2.  4. Normal left ventricular internal dimensions and wall  thicknesses.  5. Normal left ventricular systolic function. No segmental  wall motion abnormalities.  6. Normal right ventricular size and function.  Consider SUNDAR for further evaluation of the aortic valve and  possible causes of LVOT obstruction, if clinically  indicated.    < end of copied text >
[facility] NEPHROLOGY- PROGRESS NOTE    84 female with history of CHF presents with SOB. Nephrology consulted for proteinuria.    REVIEW OF SYSTEMS:  Gen: no changes in weight  Cards: no chest pain  Resp: + dyspnea on exertion  GI: no nausea or vomiting or diarrhea  : + decreased urination  Vascular: no LE edema      Hospital Medications: Medications reviewed    VITALS:  T(F): 97.5 (02-20-18 @ 05:27), Max: 98.4 (02-19-18 @ 21:03)  HR: 56 (02-20-18 @ 13:02)  BP: 155/42 (02-20-18 @ 13:02)  RR: 16 (02-20-18 @ 05:27)  SpO2: 98% (02-20-18 @ 05:27)  Wt(kg): --    02-19 @ 07:01  -  02-20 @ 07:00  --------------------------------------------------------  IN: 535 mL / OUT: 725 mL / NET: -190 mL    02-20 @ 07:01  -  02-20 @ 16:15  --------------------------------------------------------  IN: 200 mL / OUT: 430 mL / NET: -230 mL      PHYSICAL EXAM:    Gen: NAD, calm  Cards: RRR, +S1/S2, + BENNIE  Resp: CTA B/L  GI: soft, NT/ND, NABS  Vascular: no LE edema B/L      LABS:  02-20    145  |  107  |  63<H>  ----------------------------<  114<H>  4.1   |  21<L>  |  1.79<H>    Ca    9.6      20 Feb 2018 07:14  Mg     1.6     02-19      Creatinine Trend: 1.79 <--, 1.50 <--, 1.58 <--, 1.67 <--, 1.53 <--, 1.40 <--, 1.21 <--                        8.0    20.10 )-----------( 378      ( 20 Feb 2018 07:14 )             23.6      < from: Xray Chest 1 View- PORTABLE-Routine (02.20.18 @ 08:20) >  IMPRESSION:  Accentuation and indistinctness of pulmonary vascular   markings, greater on the left, as well as some left airspace opacity,   findings which may be attributable to asymmetric pulmonary edema. Limited   comparison with prior CT scan due to differing technique however   left-sided findings may be slightly increased. Superimposed infection,   particularly on the left, is not excluded.    Small left pleural effusion.      < end of copied text >
INTERVAL HISTORY:  Denies CP. Continues to have some SOB.      MEDICATIONS:  amLODIPine   Tablet 10 milliGRAM(s) Oral daily  aspirin enteric coated 81 milliGRAM(s) Oral daily  clopidogrel Tablet 75 milliGRAM(s) Oral daily  furosemide    Tablet 40 milliGRAM(s) Oral daily  heparin  Injectable 5000 Unit(s) SubCutaneous every 12 hours  hydrALAZINE 25 milliGRAM(s) Oral four times a day  metoprolol     tartrate 75 milliGRAM(s) Oral two times a day  ALBUTerol    90 MICROgram(s) HFA Inhaler 2 Puff(s) Inhalation every 6 hours PRN  buDESOnide 160 MICROgram(s)/formoterol 4.5 MICROgram(s) Inhaler 2 Puff(s) Inhalation two times a day  loratadine 10 milliGRAM(s) Oral daily  acetaminophen   Tablet. 650 milliGRAM(s) Oral every 6 hours PRN  melatonin 3 milliGRAM(s) Oral at bedtime  sertraline 100 milliGRAM(s) Oral daily  pantoprazole    Tablet 40 milliGRAM(s) Oral before breakfast  atorvastatin 20 milliGRAM(s) Oral at bedtime  predniSONE   Tablet 40 milliGRAM(s) Oral daily  benzocaine 15 mG/menthol 3.6 mG Lozenge 1 Lozenge Oral every 4 hours PRN  cholecalciferol 1000 Unit(s) Oral daily  ferrous    sulfate 325 milliGRAM(s) Oral daily  multivitamin 1 Tablet(s) Oral daily        REVIEW OF SYSTEMS:  CONSTITUTIONAL: No fever, weight loss, or fatigue  EYES: No eye pain, visual disturbances, or discharge  ENMT:  No difficulty hearing, tinnitus, vertigo; No sinus or throat pain  NECK: No pain or stiffness  RESPIRATORY: No cough, wheezing, chills or hemoptysis; (+) Shortness of Breath  CARDIOVASCULAR: No chest pain, palpitations, passing out, dizziness, or leg swelling  GASTROINTESTINAL: No abdominal or epigastric pain. No nausea, vomiting, or hematemesis; No diarrhea or constipation. No melena or hematochezia.  GENITOURINARY: No dysuria, frequency, hematuria, or incontinence  NEUROLOGICAL: No headaches, memory loss, loss of strength, numbness, or tremors  SKIN: No itching, burning, rashes, or lesions   LYMPH Nodes: No enlarged glands  ENDOCRINE: No heat or cold intolerance; No hair loss  MUSCULOSKELETAL: No joint pain or swelling; No muscle, back, or extremity pain  PSYCHIATRIC: No depression, anxiety, mood swings, or difficulty sleeping  HEME/LYMPH: No easy bruising, or bleeding gums  ALLERY AND IMMUNOLOGIC: No hives or eczema	    [X] All others negative	  [ ] Unable to obtain    PHYSICAL EXAM:  T(C): 37.1 (02-17-18 @ 06:09), Max: 37.1 (02-17-18 @ 06:09)  HR: 57 (02-17-18 @ 06:09) (57 - 76)  BP: 158/52 (02-17-18 @ 06:09) (157/40 - 158/56)  RR: 18 (02-17-18 @ 06:09) (17 - 18)  SpO2: 99% (02-17-18 @ 06:09) (99% - 99%)  Wt(kg): --  I&O's Summary    16 Feb 2018 07:01  -  17 Feb 2018 07:00  --------------------------------------------------------  IN: 630 mL / OUT: 0 mL / NET: 630 mL        Appearance: Normal	  HEENT:   Normal oral mucosa, PERRL, EOMI	  Lymphatic: No lymphadenopathy  Cardiovascular: Normal S1 S2, (+) JVD, No murmurs, No edema  Respiratory: Decreased breath sounds in bases  Psychiatry: A & O x 3, Mood & affect appropriate  Gastrointestinal:  Soft, Non-tender, + BS	  Skin: No rashes, No ecchymoses, No cyanosis	  Neurologic: Non-focal  Extremities: Normal range of motion, No clubbing, cyanosis or edema      LABS:	 	    CBC Full  -  ( 17 Feb 2018 06:30 )  WBC Count : 15.75 K/uL  Hemoglobin : 8.5 g/dL  Hematocrit : 24.7 %  Platelet Count - Automated : 388 K/uL  Mean Cell Volume : 93.6 fL  Mean Cell Hemoglobin : 32.2 pg  Mean Cell Hemoglobin Concentration : 34.4 %  Auto Neutrophil # : x  Auto Lymphocyte # : x  Auto Monocyte # : x  Auto Eosinophil # : x  Auto Basophil # : x  Auto Neutrophil % : x  Auto Lymphocyte % : x  Auto Monocyte % : x  Auto Eosinophil % : x  Auto Basophil % : x    02-17    141  |  104  |  56<H>  ----------------------------<  103<H>  4.0   |  22  |  1.67<H>  02-16    139  |  102  |  41<H>  ----------------------------<  134<H>  4.1   |  21<L>  |  1.53<H>    Ca    9.9      17 Feb 2018 06:30  Ca    9.1      16 Feb 2018 06:30  Mg     1.8     02-17  Mg     1.9     02-16          Telemetry: SB/SR (50-60) with 1st degree AVB and PVCs, no AT/AF, no WCT
INTERVAL HISTORY:  No acute events overnight. No further NSVT.      MEDICATIONS:  amLODIPine   Tablet 10 milliGRAM(s) Oral daily  aspirin enteric coated 81 milliGRAM(s) Oral daily  clopidogrel Tablet 75 milliGRAM(s) Oral daily  furosemide    Tablet 20 milliGRAM(s) Oral daily  heparin  Injectable 5000 Unit(s) SubCutaneous every 12 hours  hydrALAZINE 50 milliGRAM(s) Oral three times a day  metoprolol     tartrate 75 milliGRAM(s) Oral two times a day  ALBUTerol    90 MICROgram(s) HFA Inhaler 2 Puff(s) Inhalation every 6 hours PRN  buDESOnide 160 MICROgram(s)/formoterol 4.5 MICROgram(s) Inhaler 2 Puff(s) Inhalation two times a day  loratadine 10 milliGRAM(s) Oral daily  acetaminophen   Tablet. 650 milliGRAM(s) Oral every 6 hours PRN  melatonin 3 milliGRAM(s) Oral at bedtime  sertraline 100 milliGRAM(s) Oral daily  aluminum hydroxide/magnesium hydroxide/simethicone Suspension 30 milliLiter(s) Oral every 6 hours PRN  pantoprazole    Tablet 40 milliGRAM(s) Oral before breakfast  atorvastatin 20 milliGRAM(s) Oral at bedtime  benzocaine 15 mG/menthol 3.6 mG Lozenge 1 Lozenge Oral every 4 hours PRN  cholecalciferol 1000 Unit(s) Oral daily  ferrous    sulfate 325 milliGRAM(s) Oral daily  multivitamin 1 Tablet(s) Oral daily        REVIEW OF SYSTEMS:  CONSTITUTIONAL: No fever, weight loss, or fatigue  EYES: No eye pain, visual disturbances, or discharge  ENMT:  No difficulty hearing, tinnitus, vertigo; No sinus or throat pain  NECK: No pain or stiffness  RESPIRATORY: No cough, wheezing, chills or hemoptysis; No Shortness of Breath  CARDIOVASCULAR: No chest pain, palpitations, passing out, dizziness, or leg swelling  GASTROINTESTINAL: No abdominal or epigastric pain. No nausea, vomiting, or hematemesis; No diarrhea or constipation. No melena or hematochezia.  GENITOURINARY: No dysuria, frequency, hematuria, or incontinence  NEUROLOGICAL: No headaches, memory loss, loss of strength, numbness, or tremors  SKIN: No itching, burning, rashes, or lesions   LYMPH Nodes: No enlarged glands  ENDOCRINE: No heat or cold intolerance; No hair loss  MUSCULOSKELETAL: No joint pain or swelling; No muscle, back, or extremity pain  PSYCHIATRIC: No depression, anxiety, mood swings, or difficulty sleeping  HEME/LYMPH: No easy bruising, or bleeding gums  ALLERY AND IMMUNOLOGIC: No hives or eczema	    [X] All others negative	  [ ] Unable to obtain    PHYSICAL EXAM:  T(C): 36.6 (02-19-18 @ 05:13), Max: 36.6 (02-18-18 @ 22:19)  HR: 68 (02-19-18 @ 05:13) (58 - 68)  BP: 163/50 (02-19-18 @ 05:13) (159/50 - 198/63)  RR: 18 (02-19-18 @ 05:13) (18 - 18)  SpO2: 98% (02-19-18 @ 05:13) (98% - 99%)  Wt(kg): --  I&O's Summary    18 Feb 2018 07:01  -  19 Feb 2018 07:00  --------------------------------------------------------  IN: 250 mL / OUT: 450 mL / NET: -200 mL        Appearance: Normal	  HEENT:   Normal oral mucosa, PERRL, EOMI	  Lymphatic: No lymphadenopathy  Cardiovascular: Normal S1 S2, No JVD, No murmurs, No edema  Respiratory: Lungs clear to auscultation	  Psychiatry: A & O x 3, Mood & affect appropriate  Gastrointestinal:  Soft, Non-tender, + BS	  Skin: No rashes, No ecchymoses, No cyanosis	  Neurologic: Non-focal  Extremities: Normal range of motion, No clubbing, cyanosis or edema  Vascular: Peripheral pulses palpable 2+ bilaterally      LABS:	 	    CBC Full  -  ( 19 Feb 2018 06:51 )  WBC Count : 18.05 K/uL  Hemoglobin : 8.1 g/dL  Hematocrit : 24.8 %  Platelet Count - Automated : 387 K/uL  Mean Cell Volume : 93.9 fL  Mean Cell Hemoglobin : 30.7 pg  Mean Cell Hemoglobin Concentration : 32.7 %  Auto Neutrophil # : x  Auto Lymphocyte # : x  Auto Monocyte # : x  Auto Eosinophil # : x  Auto Basophil # : x  Auto Neutrophil % : x  Auto Lymphocyte % : x  Auto Monocyte % : x  Auto Eosinophil % : x  Auto Basophil % : x    02-19    141  |  105  |  53<H>  ----------------------------<  129<H>  4.4   |  22  |  1.50<H>  02-18    142  |  106  |  61<H>  ----------------------------<  106<H>  4.1   |  23  |  1.58<H>    Ca    9.5      19 Feb 2018 06:51  Ca    9.4      18 Feb 2018 07:14  Mg     1.6     02-19  Mg     1.7     02-18          Telemetry: SR with PVCs (60-80s)
Infectious Diseases progress note:    Subjective:  "I feel the same".  Has left sided chest discomfort with "fluttering", weakness, poor appetite.  Denies cough or purulent phlegm.  NO fever.  NO diarrhea today    ROS:  CONSTITUTIONAL:  No fever, chills, rigors  CARDIOVASCULAR:  No chest pain or palpitations  RESPIRATORY:   No SOB, cough, dyspnea on exertion.  No wheezing  GASTROINTESTINAL:  No abd pain, N/V, diarrhea/constipation  EXTREMITIES:  No swelling or joint pain  GENITOURINARY:  No burning on urination, increased frequency or urgency.  No flank pain  NEUROLOGIC:  No HA, visual disturbances  SKIN: No rashes    Allergies    lisinopril (Anaphylaxis)  penicillin (Anaphylaxis)    Intolerances        ANTIBIOTICS/RELEVANT:  antimicrobials  aztreonam  IVPB 1000 milliGRAM(s) IV Intermittent every 12 hours  vancomycin  IVPB 750 milliGRAM(s) IV Intermittent every 24 hours    immunologic:    OTHER:  acetaminophen   Tablet. 650 milliGRAM(s) Oral every 6 hours PRN  ALBUTerol    90 MICROgram(s) HFA Inhaler 2 Puff(s) Inhalation every 6 hours PRN  aluminum hydroxide/magnesium hydroxide/simethicone Suspension 30 milliLiter(s) Oral every 6 hours PRN  amLODIPine   Tablet 10 milliGRAM(s) Oral daily  aspirin 325 milliGRAM(s) Oral daily  atorvastatin 20 milliGRAM(s) Oral at bedtime  benzocaine 15 mG/menthol 3.6 mG Lozenge 1 Lozenge Oral every 4 hours PRN  buDESOnide 160 MICROgram(s)/formoterol 4.5 MICROgram(s) Inhaler 2 Puff(s) Inhalation two times a day  cholecalciferol 1000 Unit(s) Oral daily  cinacalcet 30 milliGRAM(s) Oral <User Schedule>  ferrous    sulfate 325 milliGRAM(s) Oral daily  furosemide    Tablet 20 milliGRAM(s) Oral every 48 hours  heparin  Injectable 5000 Unit(s) SubCutaneous every 12 hours  hydrALAZINE 75 milliGRAM(s) Oral three times a day  loratadine 10 milliGRAM(s) Oral daily  melatonin 3 milliGRAM(s) Oral at bedtime  metoprolol     tartrate 100 milliGRAM(s) Oral two times a day  multivitamin 1 Tablet(s) Oral daily  pantoprazole    Tablet 40 milliGRAM(s) Oral before breakfast  sertraline 100 milliGRAM(s) Oral daily      Objective:  Vital Signs Last 24 Hrs  T(C): 36.8 (22 Feb 2018 13:20), Max: 36.9 (22 Feb 2018 06:37)  T(F): 98.3 (22 Feb 2018 13:20), Max: 98.4 (22 Feb 2018 06:37)  HR: 68 (22 Feb 2018 13:20) (63 - 75)  BP: 155/48 (22 Feb 2018 13:20) (144/62 - 162/50)  BP(mean): --  RR: 17 (22 Feb 2018 13:20) (17 - 18)  SpO2: 96% (22 Feb 2018 13:20) (96% - 98%)    PHYSICAL EXAM:  Constitutional:NAD  Eyes:VICTORIANO, EOMI  Ear/Nose/Throat: no thrush, mucositis.  Moist mucous membranes	  Neck:no JVD, no lymphadenopathy, supple  Respiratory: CTA nick  Cardiovascular: S1S2 RRR, no murmurs  Gastrointestinal:soft, nontender,  nondistended (+) BS  Extremities:no e/e/c  Skin:  no rashes, open wounds or ulcerations        LABS:                        7.7    26.33 )-----------( 424      ( 22 Feb 2018 06:22 )             23.9     02-22      140  |  102  |  59<H>  ----------------------------<  129<H>  4.7   |  18<L>  |  1.71<H>    Ca    9.7      22 Feb 2018 06:22  Phos  3.1     02-22  Mg     1.6     02-22            Procalcitonin, Serum: 0.29 (02-21 @ 06:55)          MICROBIOLOGY:    Culture - Urine (02.13.18 @ 16:21)    Culture - Urine:   NO GROWTH AT 24 HOURS    Specimen Source: URINE CATHETER    Culture - Urine (02.12.14 @ 15:19)    Culture - Urine:   NO GROWTH AT 24 HOURS    Specimen Source: URINE MIDSTREAM    Rapid Respiratory Viral Panel (02.18.18 @ 15:51)    Adenovirus (RapRVP): NOT DETECTED    Influenza A (RapRVP): NOT DETECTED (any subtype)    Influenza AH1 2009 (RapRVP): NOT DETECTED    Influenza AH1 (RapRVP): NOT DETECTED    Influenza AH3 (RapRVP): NOT DETECTED    Influenza B (RapRVP): NOT DETECTED    Parainfluenza 1 (RapRVP): NOT DETECTED    Parainfluenza 2 (RapRVP): NOT DETECTED    Parainfluenza 3 (RapRVP): NOT DETECTED    Parainfluenza 4 (RapRVP): NOT DETECTED    Resp Syncytial Virus (RapRVP): NOT DETECTED    Bordetella pertussis (RapRVP): NOT DETECTED    Chlamydia pneumoniae (RapRVP): NOT DETECTED    Mycoplasma pneumoniae (RapRVP): NOT DETECTED This nucleic acid amplification assay was performed using  the [employer]. The following pathogens are tested  for: Adenovirus, Coronavirus 229E, Coronavirus HKU1,  Coronavirus NL63, Coronavirus OC43, Human Metapneumovirus  (HMPV), Rhinovirus/Enterovirus, Influenza A H1, Influenza A  H1 2009 (Pandemic H1 2009), Influenza A H3, Influenza A (Flu  A) subtype not identified, Influenza B, Parainfluenza Virus  (types 1, 2, 3, 4), Respiratory Syncytial Virus (RSV),  Bordetella pertussis, Chlamydophila pneumoniae, and  Mycoplasma pneumoniae. A negative FilmArray result does not  always exclude the possibility of viral or bacterial  infection. Laboratory results should always be interpreted  in the context of clinical findings.    Entero/Rhinovirus (RapRVP): NOT DETECTED    HKU1 Coronavirus (RapRVP): NOT DETECTED    NL63 Coronavirus (RapRVP): NOT DETECTED    229E Coronavirus (RapRVP): NOT DETECTED    OC43 Coronavirus (RapRVP): NOT DETECTED    hMPV (RapRVP): NOT DETECTED          RADIOLOGY & ADDITIONAL STUDIES:    < from: Xray Chest 1 View- PORTABLE-Urgent (02.21.18 @ 10:27) >  FINDINGS:  Cardiac size cannot be determined on this AP projection.    Patchy opacity in the left upper lung field. No pneumothorax or pleural   effusions.    Degenerative changes spine.    IMPRESSION: Patchy opacity in the left upper lung field, suspicious for   infection.    < end of copied text >
Infectious Diseases progress note:    Subjective:  Pt seen and examined earlier today.  Placed on high flow O2.  Had small amount of blood tinged sputum today.  c/o "pounding" sensation in chest.  No fever.      ROS:  CONSTITUTIONAL:  No fever, chills, rigors  CARDIOVASCULAR:  No chest pain or palpitations  RESPIRATORY:   sob, chavez  GASTROINTESTINAL:  No abd pain, N/V, diarrhea/constipation  EXTREMITIES:  No swelling or joint pain  GENITOURINARY:  No burning on urination, increased frequency or urgency.  No flank pain  NEUROLOGIC:  No HA, visual disturbances  SKIN: No rashes    Allergies    lisinopril (Anaphylaxis)  penicillin (Anaphylaxis)    Intolerances        ANTIBIOTICS/RELEVANT:  antimicrobials  azithromycin  IVPB      azithromycin  IVPB 500 milliGRAM(s) IV Intermittent every 24 hours  aztreonam  IVPB 1000 milliGRAM(s) IV Intermittent every 12 hours  micafungin IVPB 100 milliGRAM(s) IV Intermittent every 24 hours  micafungin IVPB      tigecycline IVPB 50 milliGRAM(s) IV Intermittent every 12 hours  tigecycline IVPB        immunologic:    OTHER:  acetaminophen   Tablet. 650 milliGRAM(s) Oral every 6 hours PRN  ALBUTerol    90 MICROgram(s) HFA Inhaler 2 Puff(s) Inhalation every 6 hours PRN  aluminum hydroxide/magnesium hydroxide/simethicone Suspension 30 milliLiter(s) Oral every 6 hours PRN  amLODIPine   Tablet 10 milliGRAM(s) Oral daily  aspirin 325 milliGRAM(s) Oral daily  atorvastatin 20 milliGRAM(s) Oral at bedtime  benzocaine 15 mG/menthol 3.6 mG Lozenge 1 Lozenge Oral every 4 hours PRN  buDESOnide 160 MICROgram(s)/formoterol 4.5 MICROgram(s) Inhaler 2 Puff(s) Inhalation two times a day  cholecalciferol 1000 Unit(s) Oral daily  cinacalcet 30 milliGRAM(s) Oral <User Schedule>  ferrous    sulfate 325 milliGRAM(s) Oral daily  heparin  Injectable 5000 Unit(s) SubCutaneous every 12 hours  hydrALAZINE 75 milliGRAM(s) Oral three times a day  isosorbide   mononitrate ER Tablet (IMDUR) 60 milliGRAM(s) Oral daily  lactobacillus acidophilus 1 Tablet(s) Oral daily  loratadine 10 milliGRAM(s) Oral daily  melatonin 3 milliGRAM(s) Oral at bedtime  metoprolol     tartrate 100 milliGRAM(s) Oral two times a day  multivitamin 1 Tablet(s) Oral daily  pantoprazole    Tablet 40 milliGRAM(s) Oral before breakfast  sertraline 100 milliGRAM(s) Oral daily  sodium chloride 3%  Inhalation 4 milliLiter(s) Inhalation four times a day      Objective:  Vital Signs Last 24 Hrs  T(C): 36.4 (25 Feb 2018 17:02), Max: 36.6 (25 Feb 2018 05:05)  T(F): 97.6 (25 Feb 2018 17:02), Max: 97.8 (25 Feb 2018 05:05)  HR: 71 (25 Feb 2018 17:02) (67 - 82)  BP: 155/48 (25 Feb 2018 17:02) (126/41 - 163/60)  BP(mean): 79 (24 Feb 2018 17:29) (79 - 79)  RR: 38 (25 Feb 2018 17:02) (24 - 38)  SpO2: 90% (25 Feb 2018 17:02) (90% - 100%)    PHYSICAL EXAM:  Constitutional:NAD  Eyes:VICTORIANO, EOMI  Ear/Nose/Throat: no thrush, mucositis.  Moist mucous membranes	  Neck:no JVD, no lymphadenopathy, supple  Respiratory: decr BS at bases  Cardiovascular: S1S2 RRR, no murmurs  Gastrointestinal:soft, nontender,  nondistended (+) BS  Extremities:no e/e/c  Skin:  no rashes, open wounds or ulcerations        LABS:                        8.0    30.21 )-----------( 454      ( 25 Feb 2018 05:26 )             23.6     02-25    143  |  102  |  78<H>  ----------------------------<  141<H>  4.8   |  24  |  1.73<H>    Ca    9.5      25 Feb 2018 05:25  Phos  3.8     02-25  Mg     1.8     02-25            Procalcitonin, Serum: 0.29 (02-21 @ 06:55)        Vancomycin Level, Trough: 11.5 ug/mL (02-23 @ 12:58)              MICROBIOLOGY:    Culture - Respiratory with Gram Stain (02.24.18 @ 23:38)    Culture - Respiratory:   CULTURE IN PROGRESS, FURTHER REPORT TO FOLLOW.    Specimen Source: SPUTUM    Culture - Blood (02.24.18 @ 14:22)    Culture - Blood:   NO ORGANISMS ISOLATED  NO ORGANISMS ISOLATED AT 24 HOURS    Specimen Source: BLOOD VENOUS    Culture - Blood (02.24.18 @ 14:22)    Culture - Blood:   NO ORGANISMS ISOLATED  NO ORGANISMS ISOLATED AT 24 HOURS    Specimen Source: BLOOD PERIPHERAL    Cryptococcal Antigen - Serum (02.24.18 @ 14:05)    Cryptococcal Antigen - Serum: Negative: Method: Immunochromographic Lateral Flow  Negative result does not rule out the diagnosis of  disease. The specimen may have been obtained before  detectable antigen is present.  The magnitude of the measured result, above the cutoff is  not indicative of the total amount of antigen present.    Rapid Respiratory Viral Panel (02.24.18 @ 17:02)    Adenovirus (RapRVP): NOT DETECTED    Influenza A (RapRVP): NOT DETECTED (any subtype)    Influenza AH1 2009 (RapRVP): NOT DETECTED    Influenza AH1 (RapRVP): NOT DETECTED    Influenza AH3 (RapRVP): NOT DETECTED    Influenza B (RapRVP): NOT DETECTED    Parainfluenza 1 (RapRVP): NOT DETECTED    Parainfluenza 2 (RapRVP): NOT DETECTED    Parainfluenza 3 (RapRVP): NOT DETECTED    Parainfluenza 4 (RapRVP): NOT DETECTED    Resp Syncytial Virus (RapRVP): NOT DETECTED    Bordetella pertussis (RapRVP): NOT DETECTED    Chlamydia pneumoniae (RapRVP): NOT DETECTED    Mycoplasma pneumoniae (RapRVP): NOT DETECTED This nucleic acid amplification assay was performed using  the [employer]. The following pathogens are tested  for: Adenovirus, Coronavirus 229E, Coronavirus HKU1,  Coronavirus NL63, Coronavirus OC43, Human Metapneumovirus  (HMPV), Rhinovirus/Enterovirus, Influenza A H1, Influenza A  H1 2009 (Pandemic H1 2009), Influenza A H3, Influenza A (Flu  A) subtype not identified, Influenza B, Parainfluenza Virus  (types 1, 2, 3, 4), Respiratory Syncytial Virus (RSV),  Bordetella pertussis, Chlamydophila pneumoniae, and  Mycoplasma pneumoniae. A negative FilmArray result does not  always exclude the possibility of viral or bacterial  infection. Laboratory results should always be interpreted  in the context of clinical findings.    Entero/Rhinovirus (RapRVP): NOT DETECTED    HKU1 Coronavirus (RapRVP): NOT DETECTED    NL63 Coronavirus (RapRVP): NOT DETECTED    229E Coronavirus (RapRVP): NOT DETECTED    OC43 Coronavirus (RapRVP): NOT DETECTED    hMPV (RapRVP): NOT DETECTED              RADIOLOGY & ADDITIONAL STUDIES:    < from: CT Chest No Cont (02.24.18 @ 14:58) >  CHEST:     LUNGS AND LARGE AIRWAYS: Patent central airways.  Extensive opacities in   both lung, most severe in the left upper lobe, new.   PLEURA: Small bilateral pleural effusions, increased from 2/17/2018.  VESSELS: Atherosclerotic calcifications.  HEART: Cardiomegaly. No pericardial effusion. Coronary calcifications.   Aortic valvular and mitral annular calcifications.  MEDIASTINUM AND GINI: Calcified left hilar lymph nodes.  CHEST WALL AND LOWER NECK: Within normal limits.  VISUALIZED UPPER ABDOMEN: Cholelithiasis. Scattered punctate   calcifications within the liver and spleen secondary to prior   granulomatous disease. An indeterminate cystic lesion of 2.5 cm and the   left mid renal pole  BONES: Degenerative changes.    IMPRESSION:   Extensive new opacities bilaterally are likely secondary to multifocal   pneumonia. Small bilateral pleural effusions, increased.    < end of copied text >
Infectious Diseases progress note:    Subjective: "I feel worse every day".  Pt sitting up on bed, with RN, feels nauseous and very weak.  States has chest pressure and SOB.  Has mild intermittent cough.  Felt sweats this AM.  WBC increased today.  No diarrhea/fever reported.      ROS:  CONSTITUTIONAL:  weakness  CARDIOVASCULAR: chest pressure  RESPIRATORY:   No SOB, cough, dyspnea on exertion.  No wheezing  GASTROINTESTINAL:  nausea  EXTREMITIES:  No swelling or joint pain  GENITOURINARY:  No burning on urination, increased frequency or urgency.  No flank pain  NEUROLOGIC:  No HA, visual disturbances  SKIN: No rashes    Allergies    lisinopril (Anaphylaxis)  penicillin (Anaphylaxis)    Intolerances        ANTIBIOTICS/RELEVANT:  antimicrobials  aztreonam  IVPB 1000 milliGRAM(s) IV Intermittent every 12 hours  micafungin IVPB      vancomycin  IVPB 750 milliGRAM(s) IV Intermittent every 24 hours    immunologic:    OTHER:  acetaminophen   Tablet. 650 milliGRAM(s) Oral every 6 hours PRN  ALBUTerol    90 MICROgram(s) HFA Inhaler 2 Puff(s) Inhalation every 6 hours PRN  aluminum hydroxide/magnesium hydroxide/simethicone Suspension 30 milliLiter(s) Oral every 6 hours PRN  amLODIPine   Tablet 10 milliGRAM(s) Oral daily  aspirin 325 milliGRAM(s) Oral daily  atorvastatin 20 milliGRAM(s) Oral at bedtime  benzocaine 15 mG/menthol 3.6 mG Lozenge 1 Lozenge Oral every 4 hours PRN  buDESOnide 160 MICROgram(s)/formoterol 4.5 MICROgram(s) Inhaler 2 Puff(s) Inhalation two times a day  cholecalciferol 1000 Unit(s) Oral daily  cinacalcet 30 milliGRAM(s) Oral <User Schedule>  ferrous    sulfate 325 milliGRAM(s) Oral daily  furosemide    Tablet 20 milliGRAM(s) Oral every 48 hours  heparin  Injectable 5000 Unit(s) SubCutaneous every 12 hours  hydrALAZINE 75 milliGRAM(s) Oral three times a day  isosorbide   mononitrate ER Tablet (IMDUR) 60 milliGRAM(s) Oral daily  loratadine 10 milliGRAM(s) Oral daily  melatonin 3 milliGRAM(s) Oral at bedtime  metoprolol     tartrate 100 milliGRAM(s) Oral two times a day  multivitamin 1 Tablet(s) Oral daily  pantoprazole    Tablet 40 milliGRAM(s) Oral before breakfast  sertraline 100 milliGRAM(s) Oral daily  simethicone 160 milliGRAM(s) Chew every 12 hours      Objective:  Vital Signs Last 24 Hrs  T(C): 36.7 (24 Feb 2018 05:55), Max: 37 (23 Feb 2018 13:40)  T(F): 98.1 (24 Feb 2018 05:55), Max: 98.6 (23 Feb 2018 13:40)  HR: 72 (24 Feb 2018 11:46) (63 - 81)  BP: 155/48 (24 Feb 2018 11:46) (143/93 - 155/48)  BP(mean): --  RR: 18 (24 Feb 2018 05:55) (18 - 18)  SpO2: 98% (24 Feb 2018 05:55) (92% - 98%)    PHYSICAL EXAM:  Constitutional: weak, frail, diaphoretic.    Eyes:VICTORIANO, EOMI  Ear/Nose/Throat: no thrush, mucositis.  Moist mucous membranes	  Neck:no JVD, no lymphadenopathy, supple  Respiratory: decreased BS at bases  Cardiovascular: S1S2 RRR, no murmurs  Gastrointestinal:soft, nontender,  nondistended (+) BS  Extremities:no e/e/c  Skin:  no rashes, open wounds or ulcerations        LABS:                        7.5    30.86 )-----------( 486      ( 24 Feb 2018 11:37 )             22.3     02-24    140  |  101  |  73<H>  ----------------------------<  145<H>  4.7   |  21<L>  |  1.54<H>    Ca    9.3      24 Feb 2018 07:45  Mg     1.3     02-24            Procalcitonin, Serum: 0.29 (02-21 @ 06:55)        Vancomycin Level, Trough: 11.5 ug/mL (02-23 @ 12:58)              MICROBIOLOGY:    Culture - Urine (02.13.18 @ 16:21)    Culture - Urine:   NO GROWTH AT 24 HOURS    Specimen Source: URINE CATHETER    Culture - Urine (02.12.14 @ 15:19)    Culture - Urine:   NO GROWTH AT 24 HOURS    Specimen Source: URINE MIDSTREAM          RADIOLOGY & ADDITIONAL STUDIES:    < from: Xray Chest 1 View- PORTABLE-Routine (02.24.18 @ 09:33) >  EXAM:  XR CHEST PORTABLE ROUTINE 1V        PROCEDURE DATE:  Feb 24 2018         INTERPRETATION:  TIME OF EXAM: February 24, 2018 at 9:25 AM    CLINICAL INFORMATION: Dyspnea    TECHNIQUE:   Portable chest    INTERPRETATION:     Further increase in the left upper lobe airspace opacity consistent with   worsening pneumonia. Remainder of the lungs show increased interstitial   markings likely developing edema. Heart is not enlarged. No pneumothorax.      COMPARISON:  February 23      IMPRESSION:  Follow-up left upper lobe consolidation worsening and   developing interstitial edema.    < end of copied text >
KIM ROJAS  MRN-[id number]    Patient is a 84y old  Female who presents with a chief complaint of urinary retention and shortness of breath (15 Feb 2018 12:03)      Review of System      General:	+ fatigue and weakness    Skin/Breast: denies pruritis, rash  	  Ophthalmologic: no change in vision or blurring  	  HEENT	Denies dry mouth, oral sores, dysphagia,  change in hearing.    Respiratory and Thorax:  + cough, sob, scant hemoptysis  	  Cardiovascular:	no cp , palp, orthopnea    Gastrointestinal:	no n/v/d constipation    Genitourinary:	no dysuria of frequency, no hematuria, no flank pain    Musculoskeletal:	no bone or joint pain. no muscle aches.     Neurological:	no change in sensory or motor function. no headache. no weakness.     Psychiatric:	no depression, no anxiety, insomnia.     Hematology/Lymphatics:	no bleeding or bruising      Current Meds  MEDICATIONS  (STANDING):  amLODIPine   Tablet 10 milliGRAM(s) Oral daily  aspirin 325 milliGRAM(s) Oral daily  atorvastatin 20 milliGRAM(s) Oral at bedtime  azithromycin  IVPB      azithromycin  IVPB 500 milliGRAM(s) IV Intermittent every 24 hours  aztreonam  IVPB 1000 milliGRAM(s) IV Intermittent every 12 hours  buDESOnide 160 MICROgram(s)/formoterol 4.5 MICROgram(s) Inhaler 2 Puff(s) Inhalation two times a day  cholecalciferol 1000 Unit(s) Oral daily  cinacalcet 30 milliGRAM(s) Oral <User Schedule>  ferrous    sulfate 325 milliGRAM(s) Oral daily  heparin  Injectable 5000 Unit(s) SubCutaneous every 12 hours  hydrALAZINE 75 milliGRAM(s) Oral three times a day  isosorbide   mononitrate ER Tablet (IMDUR) 60 milliGRAM(s) Oral daily  lactobacillus acidophilus 1 Tablet(s) Oral daily  loratadine 10 milliGRAM(s) Oral daily  melatonin 3 milliGRAM(s) Oral at bedtime  metoprolol     tartrate 100 milliGRAM(s) Oral two times a day  micafungin IVPB 100 milliGRAM(s) IV Intermittent every 24 hours  micafungin IVPB      multivitamin 1 Tablet(s) Oral daily  pantoprazole    Tablet 40 milliGRAM(s) Oral before breakfast  sertraline 100 milliGRAM(s) Oral daily  sodium chloride 3%  Inhalation 4 milliLiter(s) Inhalation four times a day  tigecycline IVPB 50 milliGRAM(s) IV Intermittent every 12 hours  tigecycline IVPB        MEDICATIONS  (PRN):  acetaminophen   Tablet. 650 milliGRAM(s) Oral every 6 hours PRN Mild, moderate, or severe, or temp >99.0F, or at discretion of provider  ALBUTerol    90 MICROgram(s) HFA Inhaler 2 Puff(s) Inhalation every 6 hours PRN Bronchospasm  aluminum hydroxide/magnesium hydroxide/simethicone Suspension 30 milliLiter(s) Oral every 6 hours PRN Dyspepsia  benzocaine 15 mG/menthol 3.6 mG Lozenge 1 Lozenge Oral every 4 hours PRN Sore Throat      Vitals  Vital Signs Last 24 Hrs  T(C): 36.4 (25 Feb 2018 13:48), Max: 36.7 (24 Feb 2018 15:41)  T(F): 97.5 (25 Feb 2018 13:48), Max: 98.1 (24 Feb 2018 15:41)  HR: 71 (25 Feb 2018 13:48) (67 - 82)  BP: 150/47 (25 Feb 2018 13:48) (126/41 - 190/64)  BP(mean): 79 (24 Feb 2018 17:29) (79 - 153)  RR: 24 (25 Feb 2018 15:20) (20 - 36)  SpO2: 96% (25 Feb 2018 15:20) (83% - 100%)    Physical Exam    Constitutional: NAD    Eyes: PERRLA EOMI, anicteric sclera    Heent :No oral sores, no pharyngeal injection. moist mucosa.    Neck: supple, no jvd, no LAD    Respiratory: CTA b/l     Cardiovascular: s1s2, no m/g/r    Gastrointestinal: soft, nt, nd, + BS    Extremities: no c/c/e    Neurological:A&O x 3 moves all ext.    Skin: no rash on exposed skin    Lymph Nodes: no lymphadenopathy.      Lab  CBC Full  -  ( 25 Feb 2018 05:26 )  WBC Count : 30.21 K/uL  Hemoglobin : 8.0 g/dL  Hematocrit : 23.6 %  Platelet Count - Automated : 454 K/uL  Mean Cell Volume : 92.2 fL  Mean Cell Hemoglobin : 31.3 pg  Mean Cell Hemoglobin Concentration : 33.9 %  Auto Neutrophil # : x  Auto Lymphocyte # : x  Auto Monocyte # : x  Auto Eosinophil # : x  Auto Basophil # : x  Auto Neutrophil % : x  Auto Lymphocyte % : x  Auto Monocyte % : x  Auto Eosinophil % : x  Auto Basophil % : x    02-25    143  |  102  |  78<H>  ----------------------------<  141<H>  4.8   |  24  |  1.73<H>    Ca    9.5      25 Feb 2018 05:25  Phos  3.8     02-25  Mg     1.8     02-25          Rad:    Assessment/Plan
[facility] NEPHROLOGY- PROGRESS NOTE    84 female with history of CHF presents with SOB. Nephrology consulted for proteinuria.    REVIEW OF SYSTEMS:  Gen: no changes in weight  Cards: no chest pain  Resp: +dyspnea on exertion  GI: no nausea or vomiting or diarrhea  Vascular: no LE edema      Hospital Medications: Medications reviewed    VITALS:  T(F): 97.3 (18 @ 12:24), Max: 97.6 (18 @ 05:43)  HR: 62 (18 @ 12:24)  BP: 166/50 (18 @ 12:24)  RR: 18 (18 @ 12:24)  SpO2: 98% (18 @ 12:24)  Wt(kg): --     @ 07:01  -   @ 07:00  --------------------------------------------------------  IN: 225 mL / OUT: 550 mL / NET: -325 mL      PHYSICAL EXAM:    Gen: NAD, calm  Cards: RRR, +S1/S2, + BENNIE  Resp: CTA B/L  GI: soft, NT/ND, NABS  Vascular: no LE edema B/L      LABS:      142  |  106  |  61<H>  ----------------------------<  106<H>  4.1   |  23  |  1.58<H>    Ca    9.4      2018 07:14  Mg     1.7           Creatinine Trend: 1.58 <--, 1.67 <--, 1.53 <--, 1.40 <--, 1.21 <--, 1.04 <--                        8.8    18.86 )-----------( 393      ( 2018 07:14 )             25.6     Urine Studies:  Urinalysis Basic - ( 2018 16:04 )    Color: PLYEL / Appearance: CLEAR / S.013 / pH: 6.5  Gluc: NEGATIVE / Ketone: NEGATIVE  / Bili: NEGATIVE / Urobili: NORMAL mg/dL   Blood: NEGATIVE / Protein: 300 mg/dL / Nitrite: NEGATIVE   Leuk Esterase: NEGATIVE / RBC: 0-2 / WBC 2-5   Sq Epi: OCC / Non Sq Epi:  / Bacteria: FEW      Protein, Random Urine: 45.7 mg/dL ( @ 19:25)  Creatinine, Random Urine: 43.37 mg/dL ( @ 19:25)
[facility] NEPHROLOGY- PROGRESS NOTE    84 female with history of CHF presents with SOB. Nephrology consulted for proteinuria.    REVIEW OF SYSTEMS:  Gen: no changes in weight  Cards: no chest pain  Resp: + dyspnea  GI: no nausea or vomiting or diarrhea  Vascular: no LE edema      Hospital Medications: Medications reviewed    VITALS:  T(F): 97.8 (02-25-18 @ 05:05), Max: 98.1 (02-24-18 @ 15:41)  HR: 67 (02-25-18 @ 11:35)  BP: 163/60 (02-25-18 @ 11:35)  RR: 30 (02-25-18 @ 05:05)  SpO2: 90% (02-25-18 @ 05:05)  Wt(kg): --      PHYSICAL EXAM:    Gen: NAD, calm  Cards: RRR, +S1/S2, + BENNIE  Resp: scattered rhonchi and rales  GI: soft, NT/ND, NABS  Vascular: no LE edema B/L    LABS:  02-25    143  |  102  |  78<H>  ----------------------------<  141<H>  4.8   |  24  |  1.73<H>    Ca    9.5      25 Feb 2018 05:25  Phos  3.8     02-25  Mg     1.8     02-25      Creatinine Trend: 1.73 <--, 1.54 <--, 1.57 <--, 1.71 <--, 1.77 <--, 1.79 <--, 1.50 <--                        8.0    30.21 )-----------( 454      ( 25 Feb 2018 05:26 )             23.6
[facility] NEPHROLOGY- PROGRESS NOTE    84 female with history of CHF presents with SOB. Nephrology consulted for proteinuria.    REVIEW OF SYSTEMS:  Gen: no changes in weight  Cards: no chest pain  Resp: +mild dyspnea  GI: no nausea or vomiting or diarrhea  Vascular: no LE edema      Hospital Medications: Medications reviewed    VITALS:  T(F): 98.1 (18 @ 11:48), Max: 98.8 (18 @ 06:09)  HR: 52 (18 @ 11:48)  BP: 162/50 (18 @ 11:48)  RR: 17 (18 @ 11:48)  SpO2: 100% (18 @ 11:48)  Wt(kg): --     @ 07:01  -   @ 07:00  --------------------------------------------------------  IN: 630 mL / OUT: 0 mL / NET: 630 mL        PHYSICAL EXAM:    Gen: NAD, calm  Cards: RRR, +S1/S2, + BENNIE  Resp: CTA B/L  GI: soft, NT/ND, NABS  Vascular: no LE edema B/L    LABS:      141  |  104  |  56<H>  ----------------------------<  103<H>  4.0   |  22  |  1.67<H>    Ca    9.9      2018 06:30  Mg     1.8           Creatinine Trend: 1.67 <--, 1.53 <--, 1.40 <--, 1.21 <--, 1.04 <--                        8.5    15.75 )-----------( 388      ( 2018 06:30 )             24.7     Urine Studies:  Urinalysis Basic - ( 2018 16:04 )    Color: PLYEL / Appearance: CLEAR / S.013 / pH: 6.5  Gluc: NEGATIVE / Ketone: NEGATIVE  / Bili: NEGATIVE / Urobili: NORMAL mg/dL   Blood: NEGATIVE / Protein: 300 mg/dL / Nitrite: NEGATIVE   Leuk Esterase: NEGATIVE / RBC: 0-2 / WBC 2-5   Sq Epi: OCC / Non Sq Epi:  / Bacteria: FEW      Protein, Random Urine: 45.7 mg/dL ( @ 19:25)  Creatinine, Random Urine: 43.37 mg/dL ( @ 19:25)
Patient is a 84y old  Female who presents with a chief complaint of urinary retention and shortness of breath (15 Feb 2018 12:03)      Any change in ROS: Doing ok   + SOB last night   today feels good:     MEDICATIONS  (STANDING):  amLODIPine   Tablet 10 milliGRAM(s) Oral daily  aspirin enteric coated 81 milliGRAM(s) Oral daily  atorvastatin 20 milliGRAM(s) Oral at bedtime  buDESOnide 160 MICROgram(s)/formoterol 4.5 MICROgram(s) Inhaler 2 Puff(s) Inhalation two times a day  cholecalciferol 1000 Unit(s) Oral daily  cinacalcet 30 milliGRAM(s) Oral <User Schedule>  clopidogrel Tablet 75 milliGRAM(s) Oral daily  ferrous    sulfate 325 milliGRAM(s) Oral daily  furosemide    Tablet 20 milliGRAM(s) Oral daily  heparin  Injectable 5000 Unit(s) SubCutaneous every 12 hours  hydrALAZINE 50 milliGRAM(s) Oral three times a day  loratadine 10 milliGRAM(s) Oral daily  melatonin 3 milliGRAM(s) Oral at bedtime  metoprolol     tartrate 75 milliGRAM(s) Oral two times a day  multivitamin 1 Tablet(s) Oral daily  pantoprazole    Tablet 40 milliGRAM(s) Oral before breakfast  sertraline 100 milliGRAM(s) Oral daily    MEDICATIONS  (PRN):  acetaminophen   Tablet. 650 milliGRAM(s) Oral every 6 hours PRN mild or Moderate Pain (4 - 6)  ALBUTerol    90 MICROgram(s) HFA Inhaler 2 Puff(s) Inhalation every 6 hours PRN Bronchospasm  aluminum hydroxide/magnesium hydroxide/simethicone Suspension 30 milliLiter(s) Oral every 6 hours PRN Dyspepsia  benzocaine 15 mG/menthol 3.6 mG Lozenge 1 Lozenge Oral every 4 hours PRN Sore Throat    Vital Signs Last 24 Hrs  T(C): 36.6 (19 Feb 2018 05:13), Max: 36.6 (18 Feb 2018 22:19)  T(F): 97.8 (19 Feb 2018 05:13), Max: 97.8 (18 Feb 2018 22:19)  HR: 68 (19 Feb 2018 05:13) (58 - 68)  BP: 163/50 (19 Feb 2018 05:13) (159/50 - 198/63)  BP(mean): --  RR: 18 (19 Feb 2018 05:13) (18 - 18)  SpO2: 98% (19 Feb 2018 05:13) (98% - 99%)    I&O's Summary    18 Feb 2018 07:01  -  19 Feb 2018 07:00  --------------------------------------------------------  IN: 250 mL / OUT: 450 mL / NET: -200 mL          Physical Exam:   GENERAL: NAD, well-groomed, well-developed  HEENT: VICTORIANO/   Atraumatic, Normocephalic  ENMT: No tonsillar erythema, exudates, or enlargement; Moist mucous membranes, Good dentition, No lesions  NECK: Supple, No JVD, Normal thyroid  CHEST/LUNG: Clear to auscultaion, ; No rales, rhonchi, wheezing, or rubs  CVS: Regular rate and rhythm; No murmurs, rubs, or gallops  GI: : Soft, Nontender, Nondistended; Bowel sounds present  NERVOUS SYSTEM:  Alert & Oriented X3  EXTREMITIES:  2+ Peripheral Pulses, No clubbing, cyanosis, or edema  LYMPH: No lymphadenopathy noted  SKIN: No rashes or lesions  ENDOCRINOLOGY: No Thyromegaly  PSYCH: Appropriate    Labs:                              8.1    18.05 )-----------( 387      ( 19 Feb 2018 06:51 )             24.8                         8.8    18.86 )-----------( 393      ( 18 Feb 2018 07:14 )             25.6                         8.5    15.75 )-----------( 388      ( 17 Feb 2018 06:30 )             24.7                         8.3    9.91  )-----------( 358      ( 16 Feb 2018 06:30 )             24.6     02-19    141  |  105  |  53<H>  ----------------------------<  129<H>  4.4   |  22  |  1.50<H>  02-18    142  |  106  |  61<H>  ----------------------------<  106<H>  4.1   |  23  |  1.58<H>  02-17    141  |  104  |  56<H>  ----------------------------<  103<H>  4.0   |  22  |  1.67<H>  02-16    139  |  102  |  41<H>  ----------------------------<  134<H>  4.1   |  21<L>  |  1.53<H>    Ca    9.5      19 Feb 2018 06:51  Ca    9.4      18 Feb 2018 07:14  Mg     1.6     02-19  Mg     1.7     02-18      CAPILLARY BLOOD GLUCOSE                Cultures:           Wound culture:                02-13 @ 16:21  Organism --  Culture w/ gram stain --  Specimen Source URINE CATHETER      Abscess culture:             02-13 @ 16:21  Organism --  Gram Stain --  Specimen Source URINE CATHETER        Tissue culture:           02-13 @ 16:21  Organism --  Gram Stain --  Specimen Source URINE CATHETER      Body Fluid Smear & Culture:                        02-13 @ 16:21  AFB Smear  --  Culture Acid Fast Body Fluid w/ Smear  --  Culture Acid Fast Smear Concentrated   --    Culture Results:     --  Specimen Source URINE CATHETER        Rapid Respiratory Viral Panel Result        02-18 @ 15:51  Rapid RVP --  Coronovirus --  Adenovirus NOT DETECTED  Bordetella Pertussis NOT DETECTED  Chlamydia Pneumonia NOT DETECTED  Entero/RhinovirusNOT DETECTED  HKU1 Coronovirus --  HMPV Coronovirus NOT DETECTED  Influenza A NOT DETECTED (any subtype)  Influenza AH1 NOT DETECTED  Influenza AH1 2009 NOT DETECTED  Influenza AH3 NOT DETECTED  Influenza B NOT DETECTED  Mycoplasma Pneumoniae NOT DETECTED This nucleic acid amplification assay was performed using  the [employer]. The following pathogens are tested  for: Adenovirus, Coronavirus 229E, Coronavirus HKU1,  Coronavirus NL63, Coronavirus OC43, Human Metapneumovirus  (HMPV), Rhinovirus/Enterovirus, Influenza A H1, Influenza A  H1 2009 (Pandemic H1 2009), Influenza A H3, Influenza A (Flu  A) subtype not identified, Influenza B, Parainfluenza Virus  (types 1, 2, 3, 4), Respiratory Syncytial Virus (RSV),  Bordetella pertussis, Chlamydophila pneumoniae, and  Mycoplasma pneumoniae. A negative FilmArray result does not  always exclude the possibility of viral or bacterial  infection. Laboratory results should always be interpreted  in the context of clinical findings.  NL63 Coronovirus --  OC43 Coronovirus --  Parainfluenza 1 NOT DETECTED  Parainfluenza 2 NOT DETECTED  Parainfluenza 3 NOT DETECTED  Parainfluenza 4 NOT DETECTED  Resp Syncytial Virus NOT DETECTED          Studies  Chest X-RAY  CT SCAN Chest   Venous Dopplers: LE:   CT Abdomen  Others    < from: CT Chest No Cont (02.17.18 @ 09:45) >  circumflex, and right coronary arteries. Aortic valve calcifications.   Mitralannular calcifications. Possible calcifications of the mitral   valve. No pericardial effusion.    MEDIASTINUM AND GINI: Small nonspecific solid hilar lymph nodes, some   which are calcified.    CHEST WALL AND LOWER NECK: Status post left-sided mastectomy.    VISUALIZED UPPER ABDOMEN: Scattered calcifications within the liver,   spleen, and adrenal glands. Incompletely visualized left renal cyst.   Cholelithiasis.    BONES: Degenerative changes.    IMPRESSION:     1.  The findings are most compatible with resolving pulmonary edema   rather than infection or interstitial lung disease. Follow-up is   recommended to confirm resolution in 4 weeks.                  NIMO MALONEY M.D., RADIOLOGY RESIDENT  This document has been electronically signed.  MARIO FAJARDO M.D., ATTENDING RADIOLOGIST  This document has been electronically signed. Feb 17 2018 11:16AM        < end of copied text >
Patient is a 84y old  Female who presents with a chief complaint of urinary retention and shortness of breath (15 Feb 2018 12:03)      Any change in ROS: doing ok     MEDICATIONS  (STANDING):  amLODIPine   Tablet 10 milliGRAM(s) Oral daily  aspirin enteric coated 81 milliGRAM(s) Oral daily  atorvastatin 20 milliGRAM(s) Oral at bedtime  buDESOnide 160 MICROgram(s)/formoterol 4.5 MICROgram(s) Inhaler 2 Puff(s) Inhalation two times a day  cholecalciferol 1000 Unit(s) Oral daily  cinacalcet 30 milliGRAM(s) Oral <User Schedule>  clopidogrel Tablet 75 milliGRAM(s) Oral daily  ferrous    sulfate 325 milliGRAM(s) Oral daily  furosemide    Tablet 40 milliGRAM(s) Oral daily  heparin  Injectable 5000 Unit(s) SubCutaneous every 12 hours  hydrALAZINE 25 milliGRAM(s) Oral four times a day  loratadine 10 milliGRAM(s) Oral daily  losartan 50 milliGRAM(s) Oral daily  melatonin 3 milliGRAM(s) Oral at bedtime  metoprolol     tartrate 75 milliGRAM(s) Oral two times a day  multivitamin 1 Tablet(s) Oral daily  pantoprazole    Tablet 40 milliGRAM(s) Oral before breakfast  predniSONE   Tablet 40 milliGRAM(s) Oral daily  sertraline 100 milliGRAM(s) Oral daily    MEDICATIONS  (PRN):  acetaminophen   Tablet. 650 milliGRAM(s) Oral every 6 hours PRN mild or Moderate Pain (4 - 6)  ALBUTerol    90 MICROgram(s) HFA Inhaler 2 Puff(s) Inhalation every 6 hours PRN Bronchospasm  benzocaine 15 mG/menthol 3.6 mG Lozenge 1 Lozenge Oral every 4 hours PRN Sore Throat    Vital Signs Last 24 Hrs  T(C): 36.9 (16 Feb 2018 12:05), Max: 37.1 (15 Feb 2018 23:14)  T(F): 98.5 (16 Feb 2018 12:05), Max: 98.7 (15 Feb 2018 23:14)  HR: 61 (16 Feb 2018 12:05) (61 - 70)  BP: 158/56 (16 Feb 2018 12:05) (138/48 - 168/64)  BP(mean): --  RR: 17 (16 Feb 2018 12:05) (16 - 18)  SpO2: 99% (16 Feb 2018 12:05) (96% - 100%)    I&O's Summary    15 Feb 2018 07:01  -  16 Feb 2018 07:00  --------------------------------------------------------  IN: 1070 mL / OUT: 450 mL / NET: 620 mL          Physical Exam:   GENERAL: NAD, well-groomed, well-developed  HEENT: VICTORIANO/   Atraumatic, Normocephalic  ENMT: No tonsillar erythema, exudates, or enlargement; Moist mucous membranes, Good dentition, No lesions  NECK: Supple, No JVD, Normal thyroid  CHEST/LUNG: poor air entry   CVS: Regular rate and rhythm; No murmurs, rubs, or gallops  GI: : Soft, Nontender, Nondistended; Bowel sounds present  NERVOUS SYSTEM:  Alert & Oriented X3  EXTREMITIES:  2+ Peripheral Pulses, No clubbing, cyanosis, or edema  LYMPH: No lymphadenopathy noted  SKIN: No rashes or lesions  ENDOCRINOLOGY: No Thyromegaly  PSYCH: Appropriate    Labs:                              8.3    9.91  )-----------( 358      ( 16 Feb 2018 06:30 )             24.6                         9.1    8.99  )-----------( 326      ( 15 Feb 2018 06:30 )             26.4                         9.7    9.78  )-----------( 297      ( 14 Feb 2018 05:31 )             28.8                         9.7    11.57 )-----------( 299      ( 13 Feb 2018 14:59 )             28.2     02-16    139  |  102  |  41<H>  ----------------------------<  134<H>  4.1   |  21<L>  |  1.53<H>  02-15    142  |  103  |  28<H>  ----------------------------<  89  3.9   |  22  |  1.40<H>  02-14    144  |  107  |  19  ----------------------------<  79  3.9   |  21<L>  |  1.21  02-13    144  |  108<H>  |  16  ----------------------------<  135<H>  4.3   |  22  |  1.04    Ca    9.1      16 Feb 2018 06:30  Ca    9.1      15 Feb 2018 06:30  Mg     1.9     02-16  Mg     1.5     02-15    TPro  6.6  /  Alb  3.5  /  TBili  0.6  /  DBili  x   /  AST  19  /  ALT  22  /  AlkPhos  106  02-13    CAPILLARY BLOOD GLUCOSE                Serum Pro-Brain Natriuretic Peptide: 4838 pg/mL (02-13 @ 14:59)  Cultures:           Wound culture:                02-13 @ 16:21  Organism --  Culture w/ gram stain --  Specimen Source URINE CATHETER      Abscess culture:             02-13 @ 16:21  Organism --  Gram Stain --  Specimen Source URINE CATHETER        Tissue culture:           02-13 @ 16:21  Organism --  Gram Stain --  Specimen Source URINE CATHETER      Body Fluid Smear & Culture:                        02-13 @ 16:21  AFB Smear  --  Culture Acid Fast Body Fluid w/ Smear  --  Culture Acid Fast Smear Concentrated   --    Culture Results:     --  Specimen Source URINE CATHETER      < from: CT Chest No Cont (02.14.18 @ 17:51) >  spleen. Left renal cyst. Extensive atherosclerotic changes.     Bones And Soft Tissues: Degenerative changes. The soft tissues are   unremarkable.      IMPRESSION:     1.  Emphysema. Diffuse bilateral groundglass opacities, which are   persistent but improved relative to 2014 chest CT. The differential for   this finding is broad. In the acute setting this may be from infection or   edema. However drug reaction, hypersensitivity pneumonitis, and pulmonary   siderosis can also have this appearance.    2.  Small right pleural effusion with passive atelectasis.                JOSHUA ENGLISH M.D., RADIOLOGY RESIDENT  This document has been electronically signed.  DAISY AMBROCIO M.D., ATTENDING RADIOLOGIST  This document has been electronically signed. Feb 15 2018 11:19AM        < end of copied text >          Studies  Chest X-RAY  CT SCAN Chest   Venous Dopplers: LE:   CT Abdomen  Others
Patient is a 84y old  Female who presents with a chief complaint of urinary retention and shortness of breath (15 Feb 2018 12:03)    Any change in ROS: resting without acute distress. on nasal cannular, denies SOB, cough, sputum     MEDICATIONS  (STANDING):  amLODIPine   Tablet 10 milliGRAM(s) Oral daily  aspirin enteric coated 81 milliGRAM(s) Oral daily  atorvastatin 20 milliGRAM(s) Oral at bedtime  buDESOnide 160 MICROgram(s)/formoterol 4.5 MICROgram(s) Inhaler 2 Puff(s) Inhalation two times a day  cholecalciferol 1000 Unit(s) Oral daily  cinacalcet 30 milliGRAM(s) Oral <User Schedule>  clopidogrel Tablet 75 milliGRAM(s) Oral daily  ferrous    sulfate 325 milliGRAM(s) Oral daily  furosemide    Tablet 20 milliGRAM(s) Oral daily  heparin  Injectable 5000 Unit(s) SubCutaneous every 12 hours  hydrALAZINE 25 milliGRAM(s) Oral four times a day  loratadine 10 milliGRAM(s) Oral daily  melatonin 3 milliGRAM(s) Oral at bedtime  metoprolol     tartrate 75 milliGRAM(s) Oral two times a day  multivitamin 1 Tablet(s) Oral daily  pantoprazole    Tablet 40 milliGRAM(s) Oral before breakfast  predniSONE   Tablet 40 milliGRAM(s) Oral daily  sertraline 100 milliGRAM(s) Oral daily    MEDICATIONS  (PRN):  acetaminophen   Tablet. 650 milliGRAM(s) Oral every 6 hours PRN mild or Moderate Pain (4 - 6)  ALBUTerol    90 MICROgram(s) HFA Inhaler 2 Puff(s) Inhalation every 6 hours PRN Bronchospasm  benzocaine 15 mG/menthol 3.6 mG Lozenge 1 Lozenge Oral every 4 hours PRN Sore Throat    Vital Signs Last 24 Hrs  T(C): 36.7 (17 Feb 2018 11:48), Max: 37.1 (17 Feb 2018 06:09)  T(F): 98.1 (17 Feb 2018 11:48), Max: 98.8 (17 Feb 2018 06:09)  HR: 52 (17 Feb 2018 11:48) (52 - 76)  BP: 162/50 (17 Feb 2018 11:48) (157/40 - 162/50)  BP(mean): --  RR: 17 (17 Feb 2018 11:48) (17 - 18)  SpO2: 100% (17 Feb 2018 11:48) (99% - 100%)    I&O's Summary    16 Feb 2018 07:01  -  17 Feb 2018 07:00  --------------------------------------------------------  IN: 630 mL / OUT: 0 mL / NET: 630 mL    Physical Exam:   General: NAD, well developed, well nourished.   Eyes: PERRL, EOM intact; conjunctiva and sclera clear  Head: Normocephalic, atraumatic  ENMT: No nasal discharge, airway clear  Neck: Supple, non tender,  no masses, no JVD  Respiratory: Clear to auscultation bilaterally without wheezing, rhonchi or rales  Cardiovascular: Regular rate and rhythm, 3/6 murmur  Gastrointestinal: Soft non-tender non-distended, positive bowel sounds, obese  Extremities: Normal range of motion, no clubbing, cyanosis or edema  Vascular: Peripheral pulses palpable 2+ bilaterally  Neurological: Alert and oriented x3, follows commands, answers questions appropriately.   Skin: Warm and dry. No obvious rash  Lymph Nodes: No acute cervical or supraclavicular adenopathy  Musculoskeletal: kyphosis (-), Move all extremities,   Psychiatric: Cooperative and appropriate mood    Labs:                    8.5    15.75 )-----------( 388      ( 17 Feb 2018 06:30 )             24.7                         8.3    9.91  )-----------( 358      ( 16 Feb 2018 06:30 )             24.6                         9.1    8.99  )-----------( 326      ( 15 Feb 2018 06:30 )             26.4                         9.7    9.78  )-----------( 297      ( 14 Feb 2018 05:31 )             28.8                         9.7    11.57 )-----------( 299      ( 13 Feb 2018 14:59 )             28.2     02-17    141  |  104  |  56<H>  ----------------------------<  103<H>  4.0   |  22  |  1.67<H>  02-16    139  |  102  |  41<H>  ----------------------------<  134<H>  4.1   |  21<L>  |  1.53<H>  02-15    142  |  103  |  28<H>  ----------------------------<  89  3.9   |  22  |  1.40<H>  02-14    144  |  107  |  19  ----------------------------<  79  3.9   |  21<L>  |  1.21  02-13    144  |  108<H>  |  16  ----------------------------<  135<H>  4.3   |  22  |  1.04    Ca    9.9      17 Feb 2018 06:30  Ca    9.1      16 Feb 2018 06:30  Mg     1.8     02-17  Mg     1.9     02-16    TPro  6.6  /  Alb  3.5  /  TBili  0.6  /  DBili  x   /  AST  19  /  ALT  22  /  AlkPhos  106  02-13    CAPILLARY BLOOD GLUCOSE                Cultures:           Wound culture:                02-13 @ 16:21  Organism --  Culture w/ gram stain --  Specimen Source URINE CATHETER      Abscess culture:             02-13 @ 16:21  Organism --  Gram Stain --  Specimen Source URINE CATHETER        Tissue culture:           02-13 @ 16:21  Organism --  Gram Stain --  Specimen Source URINE CATHETER      Body Fluid Smear & Culture:                        02-13 @ 16:21  AFB Smear  --  Culture Acid Fast Body Fluid w/ Smear  --  Culture Acid Fast Smear Concentrated   --    Culture Results:     --  Specimen Source URINE CATHETER      Studies  Chest X-RAY < from: Xray Chest 2 Views PA/Lat (02.13.18 @ 16:54) >  EXAM:  XR CHEST PA LAT 2V        PROCEDURE DATE:  Feb 13 2018         INTERPRETATION:  CLINICAL INFORMATION: Shortness of breath.    TECHNIQUE: PA and lateral views of the chest    COMPARISON: Chest x-ray 9/1/2015.    FINDINGS:     Mild pulmonary edema and small bilateral pleural effusions.  The cardiac mediastinal silhouette is mildly enlarged.  The visualized osseous structures are unremarkable for age.    IMPRESSION:  Mild pulmonary edema and small bilateral pleural effusions.    < end of copied text >    CT SCAN Chest < from: CT Chest No Cont (02.17.18 @ 09:45) >  EXAM:  CT CHEST        PROCEDURE DATE:  Feb 17 2018         INTERPRETATION:  CLINICAL INFORMATION: Evaluate for obstructive lung   disease    COMPARISON: CT chest 2/14/2018    PROCEDURE:   CT of the Chest was performed without intravenous contrast.Images were   obtained with inspiration and expiration.  Sagittal and coronal reformats were performed.      FINDINGS:    CHEST:     LUNGS AND LARGE AIRWAYS: The bilateral groundglass opacities and septal   thickening have slightly decreased since 2/14/2018. The 2 mm nodule in   the right upper lobe was better shown on the prior study. The calcified   left lower lobe nodule is unchanged. The groundglass opacities are   increased in density on the expiratory phase of imaging. There is no   convincing area of air trapping. The right middle lobe and right lower   lobe subpleural cysts are unchanged.    PLEURA: The bilateral trace pleural effusions have decreased in size. No   pneumothorax.    VESSELS: Atherosclerotic calcifications of the aorta and coronary   arteries.    HEART: The heart is enlarged. In particular, the left atrium is enlarged.   Coronary artery calcifications involving left anterior descending,   circumflex, and right coronary arteries. Aortic valve calcifications.   Mitralannular calcifications. Possible calcifications of the mitral   valve. No pericardial effusion.    MEDIASTINUM AND GINI: Small nonspecific solid hilar lymph nodes, some   which are calcified.    CHEST WALL AND LOWER NECK: Status post left-sided mastectomy.    VISUALIZED UPPER ABDOMEN: Scattered calcifications within the liver,   spleen, and adrenal glands. Incompletely visualized left renal cyst.   Cholelithiasis.    BONES: Degenerative changes.    IMPRESSION:     1.  The findings are most compatible with resolving pulmonary edema   rather than infection or interstitial lung disease. Follow-up is   recommended to confirm resolution in 4 weeks.    < end of copied text >    Venous Dopplers: LE:   CT Abdomen  Others  < from: Transthoracic Echocardiogram (02.15.18 @ 13:58) >    Patient name: KIM ROJAS  YOB: 1933   Age: 84 (F)   MR#: [id number]  Study Date: 2/15/2018  [facility]  Study quality: Technically good  Referring Physician: Uday Holliday MD  Blood Pressure: 170/52 mmHg  Height: 152 cm  Weight: 55 kg  BSA: 1.5 m2  ------------------------------------------------------------------------  PROCEDURE: Transthoracic echocardiogram with 2-D, M-Mode  and complete spectral and color flow Doppler.  INDICATION: Shortness of breath (R06.02)  ------------------------------------------------------------------------  DIMENSIONS:  Dimensions:     Normal Values:  LA:     4.1 cm    2.0 - 4.0 cm  Ao:     2.3 cm    2.0 - 3.8 cm  SEPTUM: 0.7 cm    0.6 - 1.2 cm  PWT:    0.7 cm    0.6 - 1.1 cm  LVIDd:  4.4 cm    3.0 - 5.6 cm  LVIDs:  2.7 cm    1.8 - 4.0 cm  Derived Variables:  LVMI: 61 g/m2  RWT: 0.31  Fractional short: 39 %  Ejection Fraction (Teicholtz): 69 %  ------------------------------------------------------------------------  OBSERVATIONS:  Mitral Valve: Mitral annular calcification, otherwise  normal mitral valve. Mild mitral regurgitation.  Aortic Root: Normal aortic root.  Aortic Valve: Aortic valve leaflet morphology not well  visualized. Peak transaortic valve gradient equals 70 mm  Hg, mean transaortic valve gradient equals 41 mm Hg.  Despite the transaortic gradients, the gross appearance of  the valve is not consistent with severe aortic stenosis.  Cannot exclude the presence of subvalvular LVOT obstruction  (ie. subaortic membrane). Mild-moderate aortic  regurgitation.  Left Atrium: Severely dilated left atrium.  LA volume index  = 65 cc/m2.  Left Ventricle: Normal left ventricular systolic function.  No segmental wall motion abnormalities. Normal left  ventricular internal dimensions and wall thicknesses.  Right Heart: Normal right atrium. Normal right ventricular  size and function. Normal tricuspid valve. Mild tricuspid  regurgitation. Normal pulmonic valve.  Pericardium/PleuraNormal pericardium with no pericardial  effusion.  ------------------------------------------------------------------------  CONCLUSIONS:  1. Mitral annular calcification, otherwise normal mitral  valve. Mild mitral regurgitation.  2. Aortic valve leaflet morphology not well visualized.  Peak transaortic valve gradient equals 70 mm Hg, mean  transaortic valve gradient equals 41 mm Hg.  Despite the  transaortic gradients, the gross appearance of the valve is  not consistent with severe aortic stenosis.  Cannot exclude  the presence of subvalvular LVOT obstruction (ie. subaortic  membrane). Mild-moderate aortic regurgitation.  3. Severely dilated left atrium.  LA volume index = 65  cc/m2.  4. Normal left ventricular internal dimensions and wall  thicknesses.  5. Normal left ventricular systolic function. No segmental  wall motion abnormalities.  6. Normal right ventricular size and function.  Consider SUNDAR for further evaluation of the aortic valve and  possible causes of LVOT obstruction, if clinically  indicated.  ----------------------    < end of copied text >
Patient is a 84y old  Female who presents with a chief complaint of urinary retention and shortness of breath (15 Feb 2018 12:03)    Pertinent ROS: doing ok. SOB with exertion remained. Denies cough, sputum. On nasal cannular    MEDICATIONS  (STANDING):  amLODIPine   Tablet 10 milliGRAM(s) Oral daily  aspirin enteric coated 81 milliGRAM(s) Oral daily  atorvastatin 20 milliGRAM(s) Oral at bedtime  buDESOnide 160 MICROgram(s)/formoterol 4.5 MICROgram(s) Inhaler 2 Puff(s) Inhalation two times a day  cholecalciferol 1000 Unit(s) Oral daily  cinacalcet 30 milliGRAM(s) Oral <User Schedule>  clopidogrel Tablet 75 milliGRAM(s) Oral daily  ferrous    sulfate 325 milliGRAM(s) Oral daily  furosemide    Tablet 20 milliGRAM(s) Oral daily  heparin  Injectable 5000 Unit(s) SubCutaneous every 12 hours  hydrALAZINE 50 milliGRAM(s) Oral three times a day  loratadine 10 milliGRAM(s) Oral daily  melatonin 3 milliGRAM(s) Oral at bedtime  metoprolol     tartrate 75 milliGRAM(s) Oral two times a day  multivitamin 1 Tablet(s) Oral daily  pantoprazole    Tablet 40 milliGRAM(s) Oral before breakfast  predniSONE   Tablet 40 milliGRAM(s) Oral daily  sertraline 100 milliGRAM(s) Oral daily    MEDICATIONS  (PRN):  acetaminophen   Tablet. 650 milliGRAM(s) Oral every 6 hours PRN mild or Moderate Pain (4 - 6)  ALBUTerol    90 MICROgram(s) HFA Inhaler 2 Puff(s) Inhalation every 6 hours PRN Bronchospasm  aluminum hydroxide/magnesium hydroxide/simethicone Suspension 30 milliLiter(s) Oral every 6 hours PRN Dyspepsia  benzocaine 15 mG/menthol 3.6 mG Lozenge 1 Lozenge Oral every 4 hours PRN Sore Throat    Vital Signs Last 24 Hrs  T(C): 36.3 (18 Feb 2018 12:24), Max: 36.4 (18 Feb 2018 05:43)  T(F): 97.3 (18 Feb 2018 12:24), Max: 97.6 (18 Feb 2018 05:43)  HR: 62 (18 Feb 2018 12:24) (61 - 67)  BP: 166/50 (18 Feb 2018 12:24) (145/51 - 210/70)  BP(mean): --  RR: 18 (18 Feb 2018 12:24) (18 - 18)  SpO2: 98% (18 Feb 2018 12:24) (98% - 98%)    I&O's Summary    17 Feb 2018 07:01  -  18 Feb 2018 07:00  --------------------------------------------------------  IN: 225 mL / OUT: 550 mL / NET: -325 mL    Physical Exam:   General: NAD, well developed, well nourished.   Eyes: PERRL, EOM intact; conjunctiva and sclera clear  Head: Normocephalic, atraumatic  ENMT: No nasal discharge, airway clear  Neck: Supple, non tender,  no masses, no JVD  Respiratory: Clear to auscultation bilaterally without wheezing, rhonchi or rales  Cardiovascular: Regular rate and rhythm, 3/6 murmur  Gastrointestinal: Soft non-tender non-distended, positive bowel sounds, obese  Extremities: Normal range of motion, no clubbing, cyanosis or edema  Vascular: Peripheral pulses palpable 2+ bilaterally  Neurological: Alert and oriented x3, follows commands, answers questions appropriately.   Skin: Warm and dry. No obvious rash  Lymph Nodes: No acute cervical or supraclavicular adenopathy  Musculoskeletal: kyphosis (-), Move all extremities,   Psychiatric: Cooperative and calm     Labs:             8.8    18.86 )-----------( 393      ( 18 Feb 2018 07:14 )             25.6     02-18    142  |  106  |  61<H>  ----------------------------<  106<H>  4.1   |  23  |  1.58<H>    Ca    9.4      18 Feb 2018 07:14  Mg     1.7     02-18      CAPILLARY BLOOD GLUCOSE      D DImer  Cultures:           Wound culture:                02-13 @ 16:21  Organism --  Culture w/ gram stain --  Specimen Source URINE CATHETER      Abscess culture:             02-13 @ 16:21  Organism --  Gram Stain --  Specimen Source URINE CATHETER        Tissue culture:           02-13 @ 16:21  Organism --  Gram Stain --  Specimen Source URINE CATHETER      Body Fluid Smear & Culture:                        02-13 @ 16:21  AFB Smear  --  Culture Acid Fast Body Fluid w/ Smear  --  Culture Acid Fast Smear Concentrated   --    Culture Results:     --  Specimen Source URINE CATHETER    Studies  Chest X-RAY < from: Xray Chest 2 Views PA/Lat (02.13.18 @ 16:54) >    EXAM:  XR CHEST PA LAT 2V        PROCEDURE DATE:  Feb 13 2018         INTERPRETATION:  CLINICAL INFORMATION: Shortness of breath.    TECHNIQUE: PA and lateral views of the chest    COMPARISON: Chest x-ray 9/1/2015.    FINDINGS:     Mild pulmonary edema and small bilateral pleural effusions.  The cardiac mediastinal silhouette is mildly enlarged.  The visualized osseous structures are unremarkable for age.    IMPRESSION:  Mild pulmonary edema and small bilateral pleural effusions.    < end of copied text >    CT SCAN Chest < from: CT Chest No Cont (02.17.18 @ 09:45) >    EXAM:  CT CHEST        PROCEDURE DATE:  Feb 17 2018         INTERPRETATION:  CLINICAL INFORMATION: Evaluate for obstructive lung   disease    COMPARISON: CT chest 2/14/2018    PROCEDURE:   CT of the Chest was performed without intravenous contrast.Images were   obtained with inspiration and expiration.  Sagittal and coronal reformats were performed.      FINDINGS:    CHEST:     LUNGS AND LARGE AIRWAYS: The bilateral groundglass opacities and septal   thickening have slightly decreased since 2/14/2018. The 2 mm nodule in   the right upper lobe was better shown on the prior study. The calcified   left lower lobe nodule is unchanged. The groundglass opacities are   increased in density on the expiratory phase of imaging. There is no   convincing area of air trapping. The right middle lobe and right lower   lobe subpleural cysts are unchanged.    PLEURA: The bilateral trace pleural effusions have decreased in size. No   pneumothorax.    VESSELS: Atherosclerotic calcifications of the aorta and coronary   arteries.    HEART: The heart is enlarged. In particular, the left atrium is enlarged.   Coronary artery calcifications involving left anterior descending,   circumflex, and right coronary arteries. Aortic valve calcifications.   Mitralannular calcifications. Possible calcifications of the mitral   valve. No pericardial effusion.    MEDIASTINUM AND GINI: Small nonspecific solid hilar lymph nodes, some   which are calcified.    CHEST WALL AND LOWER NECK: Status post left-sided mastectomy.    VISUALIZED UPPER ABDOMEN: Scattered calcifications within the liver,   spleen, and adrenal glands. Incompletely visualized left renal cyst.   Cholelithiasis.    BONES: Degenerative changes.    IMPRESSION:     1.  The findings are most compatible with resolving pulmonary edema   rather than infection or interstitial lung disease. Follow-up is   recommended to confirm resolution in 4 weeks.    < end of copied text >    CT Abdomen  Venous Dopplers: LE:   Others  < from: Transthoracic Echocardiogram (02.15.18 @ 13:58) >  Patient name: KIM ROJAS  YOB: 1933   Age: 84 (F)   MR#: [id number]  Study Date: 2/15/2018  [facility]  Study quality: Technically good  Referring Physician: Uday Holliday MD  Blood Pressure: 170/52 mmHg  Height: 152 cm  Weight: 55 kg  BSA: 1.5 m2  ------------------------------------------------------------------------  PROCEDURE: Transthoracic echocardiogram with 2-D, M-Mode  and complete spectral and color flow Doppler.  INDICATION: Shortness of breath (R06.02)  ------------------------------------------------------------------------  DIMENSIONS:  Dimensions:     Normal Values:  LA:     4.1 cm    2.0 - 4.0 cm  Ao:     2.3 cm    2.0 - 3.8 cm  SEPTUM: 0.7 cm    0.6 - 1.2 cm  PWT:    0.7 cm    0.6 - 1.1 cm  LVIDd:  4.4 cm    3.0 - 5.6 cm  LVIDs:  2.7 cm    1.8 - 4.0 cm  Derived Variables:  LVMI: 61 g/m2  RWT: 0.31  Fractional short: 39 %  Ejection Fraction (Teicholtz): 69 %  ------------------------------------------------------------------------  OBSERVATIONS:  Mitral Valve: Mitral annular calcification, otherwise  normal mitral valve. Mild mitral regurgitation.  Aortic Root: Normal aortic root.  Aortic Valve: Aortic valve leaflet morphology not well  visualized. Peak transaortic valve gradient equals 70 mm  Hg, mean transaortic valve gradient equals 41 mm Hg.  Despite the transaortic gradients, the gross appearance of  the valve is not consistent with severe aortic stenosis.  Cannot exclude the presence of subvalvular LVOT obstruction  (ie. subaortic membrane). Mild-moderate aortic  regurgitation.  Left Atrium: Severely dilated left atrium.  LA volume index  = 65 cc/m2.  Left Ventricle: Normal left ventricular systolic function.  No segmental wall motion abnormalities. Normal left  ventricular internal dimensions and wall thicknesses.  Right Heart: Normal right atrium. Normal right ventricular  size and function. Normal tricuspid valve. Mild tricuspid  regurgitation. Normal pulmonic valve.  Pericardium/PleuraNormal pericardium with no pericardial  effusion.  ------------------------------------------------------------------------  CONCLUSIONS:  1. Mitral annular calcification, otherwise normal mitral  valve. Mild mitral regurgitation.  2. Aortic valve leaflet morphology not well visualized.  Peak transaortic valve gradient equals 70 mm Hg, mean  transaortic valve gradient equals 41 mm Hg.  Despite the  transaortic gradients, the gross appearance of the valve is  not consistent with severe aortic stenosis.  Cannot exclude  the presence of subvalvular LVOT obstruction (ie. subaortic  membrane). Mild-moderate aortic regurgitation.  3. Severely dilated left atrium.  LA volume index = 65  cc/m2.  4. Normal left ventricular internal dimensions and wall  thicknesses.  5. Normal left ventricular systolic function. No segmental  wall motion abnormalities.  6. Normal right ventricular size and function.  Consider SUNDAR for further evaluation of the aortic valve and  possible causes of LVOT obstruction, if clinically  indicated.    < end of copied text >
Patient is a 84y old  Female who presents with a chief complaint of urinary retention and shortness of breath (2018 21:54)      Any change in ROS: doing ok   says her breathing is better     MEDICATIONS  (STANDING):  amLODIPine   Tablet 10 milliGRAM(s) Oral daily  aspirin enteric coated 81 milliGRAM(s) Oral daily  atorvastatin 20 milliGRAM(s) Oral at bedtime  buDESOnide 160 MICROgram(s)/formoterol 4.5 MICROgram(s) Inhaler 2 Puff(s) Inhalation two times a day  cholecalciferol 1000 Unit(s) Oral daily  cinacalcet 30 milliGRAM(s) Oral <User Schedule>  clopidogrel Tablet 75 milliGRAM(s) Oral daily  ferrous    sulfate 325 milliGRAM(s) Oral daily  furosemide   Injectable 40 milliGRAM(s) IV Push two times a day  heparin  Injectable 5000 Unit(s) SubCutaneous every 12 hours  hydrALAZINE 25 milliGRAM(s) Oral four times a day  losartan 50 milliGRAM(s) Oral daily  metoprolol     tartrate 25 milliGRAM(s) Oral two times a day  multivitamin 1 Tablet(s) Oral daily  pantoprazole    Tablet 40 milliGRAM(s) Oral before breakfast  sertraline 100 milliGRAM(s) Oral daily    MEDICATIONS  (PRN):  acetaminophen   Tablet. 650 milliGRAM(s) Oral every 6 hours PRN mild or Moderate Pain (4 - 6)  ALBUTerol    90 MICROgram(s) HFA Inhaler 2 Puff(s) Inhalation every 6 hours PRN Bronchospasm    Vital Signs Last 24 Hrs  T(C): 36.8 (15 Feb 2018 06:09), Max: 37.3 (2018 14:15)  T(F): 98.3 (15 Feb 2018 06:09), Max: 99.2 (2018 14:15)  HR: 66 (15 Feb 2018 06:09) (61 - 74)  BP: 157/55 (15 Feb 2018 06:09) (150/56 - 181/53)  BP(mean): --  RR: 18 (15 Feb 2018 06:09) (17 - 18)  SpO2: 100% (15 Feb 2018 06:09) (98% - 100%)    I&O's Summary    2018 07:01  -  15 Feb 2018 07:00  --------------------------------------------------------  IN: 500 mL / OUT: 550 mL / NET: -50 mL          Physical Exam:   GENERAL: NAD, well-groomed, well-developed  HEENT: VICTORIANO/   Atraumatic, Normocephalic  ENMT: No tonsillar erythema, exudates, or enlargement; Moist mucous membranes, Good dentition, No lesions  NECK: Supple, No JVD, Normal thyroid  CHEST/LUNG: poor air entry bilaterally   CVS: Regular rate and rhythm; No murmurs, rubs, or gallops  GI: : Soft, Nontender, Nondistended; Bowel sounds present  NERVOUS SYSTEM:  Alert & Oriented X3  EXTREMITIES:  2+ Peripheral Pulses, No clubbing, cyanosis, or edema  LYMPH: No lymphadenopathy noted  SKIN: No rashes or lesions  ENDOCRINOLOGY: No Thyromegaly  PSYCH: Appropriate    Labs:    CARDIAC MARKERS ( 2018 14:59 )  x     / < 0.06 ng/mL / 21 u/L / 1.14 ng/mL / x                                9.1    8.99  )-----------( 326      ( 15 Feb 2018 06:30 )             26.4                         9.7    9.78  )-----------( 297      ( 2018 05:31 )             28.8                         9.7    11.57 )-----------( 299      ( 2018 14:59 )             28.2     02-15    142  |  103  |  28<H>  ----------------------------<  89  3.9   |  22  |  1.40<H>  02    144  |  107  |  19  ----------------------------<  79  3.9   |  21<L>  |  1.21      144  |  108<H>  |  16  ----------------------------<  135<H>  4.3   |  22  |  1.04    Ca    9.1      15 Feb 2018 06:30  Ca    9.0      2018 05:31  Ca    9.0      2018 14:59  Phos  3.0     02-  Mg     1.5     02-15  Mg     1.6     -14    TPro  6.6  /  Alb  3.5  /  TBili  0.6  /  DBili  x   /  AST  19  /  ALT  22  /  AlkPhos  106  02-    CAPILLARY BLOOD GLUCOSE          LIVER FUNCTIONS - ( 2018 14:59 )  Alb: 3.5 g/dL / Pro: 6.6 g/dL / ALK PHOS: 106 u/L / ALT: 22 u/L / AST: 19 u/L / GGT: x             Urinalysis Basic - ( 2018 16:04 )    Color: PLYEL / Appearance: CLEAR / S.013 / pH: 6.5  Gluc: NEGATIVE / Ketone: NEGATIVE  / Bili: NEGATIVE / Urobili: NORMAL mg/dL   Blood: NEGATIVE / Protein: 300 mg/dL / Nitrite: NEGATIVE   Leuk Esterase: NEGATIVE / RBC: 0-2 / WBC 2-5   Sq Epi: OCC / Non Sq Epi: x / Bacteria: FEW      Serum Pro-Brain Natriuretic Peptide: 4838 pg/mL ( @ 14:59)  Cultures:           Wound culture:                 @ 16:21  Organism --  Culture w/ gram stain --  Specimen Source URINE CATHETER      Abscess culture:              @ 16:21  Organism --  Gram Stain --  Specimen Source URINE CATHETER        Tissue culture:            @ 16:21  Organism --  Gram Stain --  Specimen Source URINE CATHETER      Body Fluid Smear & Culture:                         @ 16:21  AFB Smear  --  Culture Acid Fast Body Fluid w/ Smear  --  Culture Acid Fast Smear Concentrated   --    Culture Results:     --  Specimen Source URINE CATHETER    < from: CT Chest No Cont (18 @ 17:51) >  EXAM:  CT CHEST        PROCEDURE DATE:  2018     ******PRELIMINARY REPORT******    ******PRELIMINARY REPORT******            INTERPRETATION:  Small right pleural effusion.  Emphysematous changes of the lungs.  Cardiomegaly  Cholelithiasis            ******PRELIMINARY REPORT******    ******PRELIMINARY REPORT******          DARNELL SOLIZ M.D., RADIOLOGY RESIDENT    < end of copied text >            Studies  Chest X-RAY  CT SCAN Chest   Venous Dopplers: LE:   CT Abdomen  Others
Patient seen and examined at bedside.    Overnight Events: Continues to be in rate controlled Afib, asymptomatic.    Review Of Systems: No chest pain, shortness of breath, or palpitations            Medications:  acetaminophen   Tablet. 650 milliGRAM(s) Oral every 6 hours PRN  ALBUTerol    90 MICROgram(s) HFA Inhaler 2 Puff(s) Inhalation every 6 hours PRN  aluminum hydroxide/magnesium hydroxide/simethicone Suspension 30 milliLiter(s) Oral every 6 hours PRN  amLODIPine   Tablet 10 milliGRAM(s) Oral daily  aspirin 325 milliGRAM(s) Oral daily  atorvastatin 20 milliGRAM(s) Oral at bedtime  aztreonam  IVPB 1000 milliGRAM(s) IV Intermittent every 12 hours  benzocaine 15 mG/menthol 3.6 mG Lozenge 1 Lozenge Oral every 4 hours PRN  buDESOnide 160 MICROgram(s)/formoterol 4.5 MICROgram(s) Inhaler 2 Puff(s) Inhalation two times a day  cholecalciferol 1000 Unit(s) Oral daily  cinacalcet 30 milliGRAM(s) Oral <User Schedule>  ferrous    sulfate 325 milliGRAM(s) Oral daily  furosemide    Tablet 20 milliGRAM(s) Oral every 48 hours  heparin  Injectable 5000 Unit(s) SubCutaneous every 12 hours  hydrALAZINE 75 milliGRAM(s) Oral three times a day  loratadine 10 milliGRAM(s) Oral daily  melatonin 3 milliGRAM(s) Oral at bedtime  metoprolol     tartrate 100 milliGRAM(s) Oral two times a day  multivitamin 1 Tablet(s) Oral daily  pantoprazole    Tablet 40 milliGRAM(s) Oral before breakfast  sertraline 100 milliGRAM(s) Oral daily  vancomycin  IVPB 750 milliGRAM(s) IV Intermittent every 24 hours      PAST MEDICAL & SURGICAL HISTORY:  Congestive heart failure (CHF)  Pulmonary hypertension  Emphysema of lung  Femoral Artery Thrombosis: partiac oclusion with no inteventions as per Pt on ASA Plavix  History of Colonoscopy with Polypectomy  History of Hysterectomy  Diverticulosis of Colon  Breast Cancer: S/P Lt lumpectomy &amp; RT  Dyslipidemia  DM (Diabetes Mellitus)  HTN (Hypertension)  COPD (Chronic Obstructive Pulmonary Disease)  History of tonsillectomy  S/P lumpectomy, left breast: 1998  H/O total hysterectomy: 1999      Vitals:  T(F): 98.4 (02-22), Max: 98.4 (02-22)  HR: 65 (02-22) (63 - 75)  BP: 144/62 (02-22) (144/62 - 162/50)  RR: 18 (02-22)  SpO2: 98% (02-22)  I&O's Summary    21 Feb 2018 07:01  -  22 Feb 2018 07:00  --------------------------------------------------------  IN: 0 mL / OUT: 750 mL / NET: -750 mL        Physical Exam:  Appearance: Normal	  HEENT:   Normal oral mucosa, PERRL, EOMI	  Lymphatic: No lymphadenopathy  Cardiovascular: Normal S1 S2, ireeg rhythm, No JVD, No murmurs, No edema  Respiratory: Lungs clear to auscultation	  Psychiatry: A & O x 3, Mood & affect appropriate  Gastrointestinal:  Soft, Non-tender, + BS	  Skin: No rashes, No ecchymoses, No cyanosis	  Neurologic: Non-focal  Extremities: Normal range of motion, No clubbing, cyanosis or edema  Vascular: Peripheral pulses palpable 2+ bilaterally                          7.7    26.33 )-----------( 424      ( 22 Feb 2018 06:22 )             23.9     02-22    140  |  102  |  59<H>  ----------------------------<  129<H>  4.7   |  18<L>  |  1.71<H>    Ca    9.7      22 Feb 2018 06:22  Phos  3.1     02-22  Mg     1.6     02-22        CARDIAC MARKERS ( 21 Feb 2018 10:30 )  x     / < 0.06 ng/mL / 7 u/L / 1.00 ng/mL / x        Interpretation of Telemetry:  AF 60-70      A/P:  83 yo F with h/o COPD on 3L of O2 at home, HTN, CHF, DM, HLD, diverticulosis, breast ca s/p lumpectomy and RT, presents for ADHF now with NSVT. Course c/b AF.    AF. CHADS-VASc 6. No records showing AF in the past. TTE showing severely dilated LA.  - AC when able    - will discuss SUNDAR/CV with attending and when able to AC  - cont current lopressor    NSVT, symptomatic, resolved.    - continue Lopressor    Ernesto Mckoy MD  Cardiology Fellow [id number]
Patient seen and examined at bedside.    Overnight Events: NIRVA. Had some couplets overnight, asymptomatic.    Review Of Systems: No chest pain, shortness of breath, or palpitations            Medications:  acetaminophen   Tablet. 650 milliGRAM(s) Oral every 6 hours PRN  ALBUTerol    90 MICROgram(s) HFA Inhaler 2 Puff(s) Inhalation every 6 hours PRN  aluminum hydroxide/magnesium hydroxide/simethicone Suspension 30 milliLiter(s) Oral every 6 hours PRN  amLODIPine   Tablet 10 milliGRAM(s) Oral daily  aspirin enteric coated 81 milliGRAM(s) Oral daily  atorvastatin 20 milliGRAM(s) Oral at bedtime  benzocaine 15 mG/menthol 3.6 mG Lozenge 1 Lozenge Oral every 4 hours PRN  buDESOnide 160 MICROgram(s)/formoterol 4.5 MICROgram(s) Inhaler 2 Puff(s) Inhalation two times a day  cholecalciferol 1000 Unit(s) Oral daily  cinacalcet 30 milliGRAM(s) Oral <User Schedule>  clopidogrel Tablet 75 milliGRAM(s) Oral daily  ferrous    sulfate 325 milliGRAM(s) Oral daily  furosemide    Tablet 20 milliGRAM(s) Oral daily  heparin  Injectable 5000 Unit(s) SubCutaneous every 12 hours  hydrALAZINE 75 milliGRAM(s) Oral three times a day  isosorbide   mononitrate ER Tablet (IMDUR) 30 milliGRAM(s) Oral daily  loratadine 10 milliGRAM(s) Oral daily  melatonin 3 milliGRAM(s) Oral at bedtime  metoprolol     tartrate 75 milliGRAM(s) Oral two times a day  multivitamin 1 Tablet(s) Oral daily  pantoprazole    Tablet 40 milliGRAM(s) Oral before breakfast  sertraline 100 milliGRAM(s) Oral daily      PAST MEDICAL & SURGICAL HISTORY:  Congestive heart failure (CHF)  Pulmonary hypertension  Emphysema of lung  Femoral Artery Thrombosis: partiac oclusion with no inteventions as per Pt on ASA Plavix  History of Colonoscopy with Polypectomy  History of Hysterectomy  Diverticulosis of Colon  Breast Cancer: S/P Lt lumpectomy &amp; RT  Dyslipidemia  DM (Diabetes Mellitus)  HTN (Hypertension)  COPD (Chronic Obstructive Pulmonary Disease)  History of tonsillectomy  S/P lumpectomy, left breast: 1998  H/O total hysterectomy: 1999      Vitals:  T(F): 97.5 (02-20), Max: 98.4 (02-19)  HR: 58 (02-20) (49 - 66)  BP: 157/55 (02-20) (157/55 - 193/55)  RR: 16 (02-20)  SpO2: 98% (02-20)  I&O's Summary    19 Feb 2018 07:01  -  20 Feb 2018 07:00  --------------------------------------------------------  IN: 535 mL / OUT: 725 mL / NET: -190 mL    20 Feb 2018 07:01  -  20 Feb 2018 11:22  --------------------------------------------------------  IN: 200 mL / OUT: 240 mL / NET: -40 mL        Physical Exam:  Appearance: Normal	  HEENT:   Normal oral mucosa, PERRL, EOMI	  Lymphatic: No lymphadenopathy  Cardiovascular: Normal S1 S2, No JVD, No murmurs, No edema  Respiratory: Lungs clear to auscultation	  Psychiatry: A & O x 3, Mood & affect appropriate  Gastrointestinal:  Soft, Non-tender, + BS	  Skin: No rashes, No ecchymoses, No cyanosis	  Neurologic: Non-focal  Extremities: Normal range of motion, No clubbing, cyanosis or edema  Vascular: Peripheral pulses palpable 2+ bilaterally                          8.0    20.10 )-----------( 378      ( 20 Feb 2018 07:14 )             23.6     02-20    145  |  107  |  63<H>  ----------------------------<  114<H>  4.1   |  21<L>  |  1.79<H>    Ca    9.6      20 Feb 2018 07:14  Mg     1.6     02-19            Serum Pro-Brain Natriuretic Peptide: 4838 pg/mL (02-13 @ 14:59)    Interpretation of Telemetry:  SR 50-60s, couplets      A/P:  83 yo F with h/o COPD on 3L of O2 at home, HTN, CHF, DM, HLD, diverticulosis, breast ca s/p lumpectomy and RT, presents for ADHF now with NSVT. NSVT, symptomatic. Episode this morning with palpiations..    NSVT, symptomatic, resolved. Some couplets overnight that are asymptomatic.   - continue Lopressor 75mg BID    Ernesto Mckoy MD  Cardiology Fellow [id number]
Patient seen and examined at bedside.    Overnight Events: Went into AF overnight, rates controlled. Says she feels less SOB today.    Review Of Systems: No chest pain, shortness of breath, or palpitations            Medications:  acetaminophen   Tablet. 650 milliGRAM(s) Oral every 6 hours PRN  ALBUTerol    90 MICROgram(s) HFA Inhaler 2 Puff(s) Inhalation every 6 hours PRN  aluminum hydroxide/magnesium hydroxide/simethicone Suspension 30 milliLiter(s) Oral every 6 hours PRN  amLODIPine   Tablet 10 milliGRAM(s) Oral daily  atorvastatin 20 milliGRAM(s) Oral at bedtime  benzocaine 15 mG/menthol 3.6 mG Lozenge 1 Lozenge Oral every 4 hours PRN  buDESOnide 160 MICROgram(s)/formoterol 4.5 MICROgram(s) Inhaler 2 Puff(s) Inhalation two times a day  cholecalciferol 1000 Unit(s) Oral daily  cinacalcet 30 milliGRAM(s) Oral <User Schedule>  ferrous    sulfate 325 milliGRAM(s) Oral daily  heparin  Injectable 5000 Unit(s) SubCutaneous every 12 hours  hydrALAZINE 75 milliGRAM(s) Oral three times a day  isosorbide   mononitrate ER Tablet (IMDUR) 30 milliGRAM(s) Oral daily  loratadine 10 milliGRAM(s) Oral daily  melatonin 3 milliGRAM(s) Oral at bedtime  metoprolol     tartrate 100 milliGRAM(s) Oral two times a day  multivitamin 1 Tablet(s) Oral daily  pantoprazole    Tablet 40 milliGRAM(s) Oral before breakfast  sertraline 100 milliGRAM(s) Oral daily      PAST MEDICAL & SURGICAL HISTORY:  Congestive heart failure (CHF)  Pulmonary hypertension  Emphysema of lung  Femoral Artery Thrombosis: partiac oclusion with no inteventions as per Pt on ASA Plavix  History of Colonoscopy with Polypectomy  History of Hysterectomy  Diverticulosis of Colon  Breast Cancer: S/P Lt lumpectomy &amp; RT  Dyslipidemia  DM (Diabetes Mellitus)  HTN (Hypertension)  COPD (Chronic Obstructive Pulmonary Disease)  History of tonsillectomy  S/P lumpectomy, left breast: 1998  H/O total hysterectomy: 1999      Vitals:  T(F): 97.9 (02-21), Max: 99.6 (02-20)  HR: 63 (02-21) (56 - 68)  BP: 153/50 (02-21) (153/50 - 185/49)  RR: 18 (02-21)  SpO2: 98% (02-21)  I&O's Summary    20 Feb 2018 07:01  -  21 Feb 2018 07:00  --------------------------------------------------------  IN: 320 mL / OUT: 930 mL / NET: -610 mL        Physical Exam:  Appearance: Normal	  HEENT:   Normal oral mucosa, PERRL, EOMI	  Lymphatic: No lymphadenopathy  Cardiovascular: Normal S1 S2, ireeg rhythm, No JVD, No murmurs, No edema  Respiratory: Lungs clear to auscultation	  Psychiatry: A & O x 3, Mood & affect appropriate  Gastrointestinal:  Soft, Non-tender, + BS	  Skin: No rashes, No ecchymoses, No cyanosis	  Neurologic: Non-focal  Extremities: Normal range of motion, No clubbing, cyanosis or edema  Vascular: Peripheral pulses palpable 2+ bilaterally                          7.7    22.22 )-----------( 381      ( 21 Feb 2018 06:55 )             23.1     02-21    140  |  103  |  60<H>  ----------------------------<  154<H>  4.5   |  20<L>  |  1.77<H>    Ca    9.6      21 Feb 2018 06:55  Phos  3.1     02-21  Mg     1.6     02-21    Interpretation of Telemetry:  AF 70-80s      A/P:  83 yo F with h/o COPD on 3L of O2 at home, HTN, CHF, DM, HLD, diverticulosis, breast ca s/p lumpectomy and RT, presents for ADHF now with NSVT. Course c/b AF.    AF. CHADS-VASc 6. No records showing AF in the past. TTE showing severely dilated LA.  - AC when able  - will discuss SUNDAR/CV with attending and when able to AC  - cont current lopressor    NSVT, symptomatic, resolved.    - continue Lopressor    Ernesto Mckoy MD  Cardiology Fellow [id number]
Pulmonary covering Dr. Saucedo    Follow-up Pulm Progress Note    pt continues to complain of SOB, and dyspnea.   improved since admission but continuing, not improved  on n.c o2  + Weakness        Admit Diagnosis:  Other form of dyspnea      Past Medical & Surgical History:  Congestive heart failure (CHF)  Pulmonary hypertension  Emphysema of lung  Femoral Artery Thrombosis  History of Colonoscopy with Polypectomy  History of Hysterectomy  Diverticulosis of Colon  Breast Cancer  Dyslipidemia  DM (Diabetes Mellitus)  HTN (Hypertension)  COPD (Chronic Obstructive Pulmonary Disease)  History of tonsillectomy  S/P lumpectomy, left breast  H/O total hysterectomy      Medications:  MEDICATIONS  (STANDING):  amLODIPine   Tablet 10 milliGRAM(s) Oral daily  aspirin enteric coated 81 milliGRAM(s) Oral daily  atorvastatin 20 milliGRAM(s) Oral at bedtime  buDESOnide 160 MICROgram(s)/formoterol 4.5 MICROgram(s) Inhaler 2 Puff(s) Inhalation two times a day  cholecalciferol 1000 Unit(s) Oral daily  cinacalcet 30 milliGRAM(s) Oral <User Schedule>  clopidogrel Tablet 75 milliGRAM(s) Oral daily  ferrous    sulfate 325 milliGRAM(s) Oral daily  heparin  Injectable 5000 Unit(s) SubCutaneous every 12 hours  hydrALAZINE 75 milliGRAM(s) Oral three times a day  isosorbide   mononitrate ER Tablet (IMDUR) 30 milliGRAM(s) Oral daily  loratadine 10 milliGRAM(s) Oral daily  melatonin 3 milliGRAM(s) Oral at bedtime  metoprolol     tartrate 100 milliGRAM(s) Oral two times a day  multivitamin 1 Tablet(s) Oral daily  pantoprazole    Tablet 40 milliGRAM(s) Oral before breakfast  sertraline 100 milliGRAM(s) Oral daily    MEDICATIONS  (PRN):  acetaminophen   Tablet. 650 milliGRAM(s) Oral every 6 hours PRN mild or Moderate Pain (4 - 6)  ALBUTerol    90 MICROgram(s) HFA Inhaler 2 Puff(s) Inhalation every 6 hours PRN Bronchospasm  aluminum hydroxide/magnesium hydroxide/simethicone Suspension 30 milliLiter(s) Oral every 6 hours PRN Dyspepsia  benzocaine 15 mG/menthol 3.6 mG Lozenge 1 Lozenge Oral every 4 hours PRN Sore Throat      Vent settings (if applicable)      Vital Signs Last 24 Hrs  T(C): 36.4 (20 Feb 2018 05:27), Max: 36.9 (19 Feb 2018 21:03)  T(F): 97.5 (20 Feb 2018 05:27), Max: 98.4 (19 Feb 2018 21:03)  HR: 58 (20 Feb 2018 05:27) (58 - 66)  BP: 157/55 (20 Feb 2018 05:27) (157/55 - 193/55)  BP(mean): 116 (20 Feb 2018 05:27) (116 - 145)  RR: 16 (20 Feb 2018 05:27) (16 - 17)  SpO2: 98% (20 Feb 2018 05:27) (97% - 98%)          02-19 @ 07:01  -  02-20 @ 07:00  --------------------------------------------------------  IN: 535 mL / OUT: 725 mL / NET: -190 mL          LABS:                        8.0    20.10 )-----------( 378      ( 20 Feb 2018 07:14 )             23.6     02-20    145  |  107  |  63<H>  ----------------------------<  114<H>  4.1   |  21<L>  |  1.79<H>    Ca    9.6      20 Feb 2018 07:14  Mg     1.6     02-19                        CULTURES:    RVP (PCR) Result: Negative (02.13.14 @ 14:24)        Physical Examination:  GEN:  Alert, Verbal, Oriented, NAD  HEENT: N/C A/T No JVD, Dry MM  PULM: B/L air entry, b/l wheezing, decreased bs t bases  CVS: Regular rate and rhythm, + murmurs  ABD: Soft, NT, ND + BS  EXT: No C/C/E    RADIOLOGY REVIEWED  CXR:  < from: Xray Chest 1 View- PORTABLE-Routine (02.20.18 @ 08:20) >  Accentuation and indistinctness of pulmonary vascular   markings, greater on the left, as well as some left airspace opacity,   findings which may be attributable to asymmetric pulmonary edema. Limited   comparison with prior CT scan due to differing technique however   left-sided findings may be slightly increased. Superimposed infection,   particularly on the left, is not excluded.    Small left pleural effusion.    < end of copied text >    CT chest:  < from: CT Chest No Cont (02.17.18 @ 09:45) >  The findings are most compatible with resolving pulmonary edema   rather than infection or interstitial lung disease. Follow-up is   recommended to confirm resolution in 4 weeks.      < end of copied text >  < from: CT Chest No Cont (02.17.18 @ 09:45) >  The findings are most compatible with resolving pulmonary edema   rather than infection or interstitial lung disease. Follow-up is   recommended to confirm resolution in 4 weeks.      < end of copied text >    TTE:    < from: Transthoracic Echocardiogram (02.15.18 @ 13:58) >  1. Mitral annular calcification, otherwise normal mitral  valve. Mild mitral regurgitation.  2. Aortic valve leaflet morphology not well visualized.  Peak transaortic valve gradient equals 70 mm Hg, mean  transaortic valve gradient equals 41 mm Hg.  Despite the  transaortic gradients, the gross appearance of the valve is  not consistent with severe aortic stenosis.  Cannot exclude  the presence of subvalvular LVOT obstruction (ie. subaortic  membrane). Mild-moderate aortic regurgitation.  3. Severely dilated left atrium.  LA volume index = 65  cc/m2.  4. Normal left ventricular internal dimensions and wall  thicknesses.  5. Normal left ventricular systolic function. No segmental  wall motion abnormalities.  6. Normal right ventricular size and function.  Consider SUNDAR for further evaluation of the aortic valve and  possible causes of LVOT obstruction, if clinically  indicated.    < end of copied text >
[facility] NEPHROLOGY- PROGRESS NOTE    84 female with history of CHF presents with SOB. Nephrology consulted for proteinuria.    REVIEW OF SYSTEMS:  Gen: no changes in weight  Cards: no chest pain  Resp: + dyspnea on exertion  GI: no nausea or vomiting or diarrhea  Vascular: no LE edema      Hospital Medications: Medications reviewed    VITALS:  T(F): 98.1 (02-24-18 @ 05:55), Max: 98.6 (02-23-18 @ 13:40)  HR: 77 (02-24-18 @ 05:55)  BP: 143/93 (02-24-18 @ 05:55)  RR: 18 (02-24-18 @ 05:55)  SpO2: 98% (02-24-18 @ 05:55)  Wt(kg): --    02-23 @ 07:01  -  02-24 @ 07:00  --------------------------------------------------------  IN: 500 mL / OUT: 400 mL / NET: 100 mL      PHYSICAL EXAM:    Gen: NAD, calm  Cards: RRR, +S1/S2, + BENNIE  Resp: BABS rales  GI: soft, NT/ND, NABS  Vascular: no LE edema B/L    LABS:  02-24    140  |  101  |  73<H>  ----------------------------<  145<H>  4.7   |  21<L>  |  1.54<H>    Ca    9.3      24 Feb 2018 07:45  Mg     1.3     02-24      Creatinine Trend: 1.54 <--, 1.57 <--, 1.71 <--, 1.77 <--, 1.79 <--, 1.50 <--, 1.58 <--                        7.1    26.39 )-----------( 460      ( 24 Feb 2018 07:45 )             21.7
[facility] NEPHROLOGY- PROGRESS NOTE    84 female with history of CHF presents with SOB. Nephrology consulted for proteinuria.    REVIEW OF SYSTEMS:  Gen: no changes in weight  Cards: no chest pain  Resp: + dyspnea on exertion  GI: no nausea or vomiting or diarrhea  Vascular: no LE edema      Hospital Medications: Medications reviewed    VITALS:  T(F): 98.6 (02-23-18 @ 13:40), Max: 98.6 (02-23-18 @ 13:40)  HR: 71 (02-23-18 @ 13:40)  BP: 154/45 (02-23-18 @ 13:40)  RR: 18 (02-23-18 @ 13:40)  SpO2: 94% (02-23-18 @ 13:40)  Wt(kg): --    02-22 @ 07:01  -  02-23 @ 07:00  --------------------------------------------------------  IN: 540 mL / OUT: 300 mL / NET: 240 mL      PHYSICAL EXAM:    Gen: NAD, calm  Cards: RRR, +S1/S2, + BENNIE  Resp: CTA B/L  GI: soft, NT/ND, NABS  Vascular: no LE edema B/L      LABS:  02-23    140  |  103  |  63<H>  ----------------------------<  152<H>  4.3   |  21<L>  |  1.57<H>    Ca    9.6      23 Feb 2018 05:30  Phos  3.1     02-22  Mg     1.5     02-23      Creatinine Trend: 1.57 <--, 1.71 <--, 1.77 <--, 1.79 <--, 1.50 <--, 1.58 <--, 1.67 <--                        7.7    22.63 )-----------( 397      ( 23 Feb 2018 05:30 )             23.7        < from: Xray Chest 1 View- PORTABLE-Routine (02.23.18 @ 07:34) >  IMPRESSION: Patchy opacities in the left upper lobe consistent with   pneumonia are increased from prior.    < end of copied text >
[facility] NEPHROLOGY- PROGRESS NOTE    84 female with history of CHF presents with SOB. Nephrology consulted for proteinuria.    REVIEW OF SYSTEMS:  Gen: no changes in weight  Cards: no chest pain  Resp: no dyspnea  GI: no nausea or vomiting or diarrhea  Vascular: no LE edema      Hospital Medications: Medications reviewed    VITALS:  T(F): 98.5 (02-16-18 @ 12:05), Max: 98.7 (02-15-18 @ 23:14)  HR: 61 (02-16-18 @ 12:05)  BP: 158/56 (02-16-18 @ 12:05)  RR: 17 (02-16-18 @ 12:05)  SpO2: 99% (02-16-18 @ 12:05)  Wt(kg): --    02-15 @ 07:01  -  02-16 @ 07:00  --------------------------------------------------------  IN: 1070 mL / OUT: 450 mL / NET: 620 mL      PHYSICAL EXAM:    Gen: NAD, calm  Cards: RRR, +S1/S2, + BENNIE  Resp: CTA B/L  GI: soft, NT/ND, NABS  Vascular: no LE edema B/L      LABS:  02-16    139  |  102  |  41<H>  ----------------------------<  134<H>  4.1   |  21<L>  |  1.53<H>    Ca    9.1      16 Feb 2018 06:30  Mg     1.9     02-16      Creatinine Trend: 1.53 <--, 1.40 <--, 1.21 <--, 1.04 <--                        8.3    9.91  )-----------( 358      ( 16 Feb 2018 06:30 )             24.6
CARDIOLOGY FOLLOW UP - Dr. Holliday    CC + sob   chest heaviness, no palpitations       PHYSICAL EXAM:  T(C): 37 (02-23-18 @ 13:40), Max: 37 (02-23-18 @ 13:40)  HR: 71 (02-23-18 @ 13:40) (61 - 71)  BP: 154/45 (02-23-18 @ 13:40) (154/45 - 170/48)  RR: 18 (02-23-18 @ 13:40) (18 - 18)  SpO2: 94% (02-23-18 @ 13:40) (94% - 98%)  Wt(kg): --  I&O's Summary    22 Feb 2018 07:01  -  23 Feb 2018 07:00  --------------------------------------------------------  IN: 540 mL / OUT: 300 mL / NET: 240 mL        Appearance: Normal	  Cardiovascular: Normal S1 S2, irregular + murmur   Respiratory:  diminished   Gastrointestinal:  Soft, Non-tender, + BS	  Extremities: Normal range of motion, No clubbing, cyanosis or edema        MEDICATIONS  (STANDING):  ALBUTerol/ipratropium for Nebulization. 3 milliLiter(s) Nebulizer once  amLODIPine   Tablet 10 milliGRAM(s) Oral daily  aspirin 325 milliGRAM(s) Oral daily  atorvastatin 20 milliGRAM(s) Oral at bedtime  aztreonam  IVPB 1000 milliGRAM(s) IV Intermittent every 12 hours  buDESOnide 160 MICROgram(s)/formoterol 4.5 MICROgram(s) Inhaler 2 Puff(s) Inhalation two times a day  cholecalciferol 1000 Unit(s) Oral daily  cinacalcet 30 milliGRAM(s) Oral <User Schedule>  ferrous    sulfate 325 milliGRAM(s) Oral daily  furosemide    Tablet 20 milliGRAM(s) Oral every 48 hours  heparin  Injectable 5000 Unit(s) SubCutaneous every 12 hours  hydrALAZINE 75 milliGRAM(s) Oral three times a day  isosorbide   mononitrate ER Tablet (IMDUR) 60 milliGRAM(s) Oral daily  loratadine 10 milliGRAM(s) Oral daily  melatonin 3 milliGRAM(s) Oral at bedtime  metoprolol     tartrate 100 milliGRAM(s) Oral two times a day  multivitamin 1 Tablet(s) Oral daily  pantoprazole    Tablet 40 milliGRAM(s) Oral before breakfast  sertraline 100 milliGRAM(s) Oral daily  simethicone 160 milliGRAM(s) Chew every 12 hours  vancomycin  IVPB 750 milliGRAM(s) IV Intermittent every 24 hours      TELEMETRY: Afib 	    ECG:  	  RADIOLOGY:   DIAGNOSTIC TESTING:  [ ] Echocardiogram:  [ ]  Catheterization:  [ ] Stress Test:    OTHER: 	  < from: Xray Chest 1 View- PORTABLE-Routine (02.23.18 @ 07:34) >  FINDINGS:  Cardiac size cannot be determined on this AP projection.    Patchy opacities in the left upper lobe that are increased from prior. No   pneumothorax. No pleural effusions.    Degenerative changes of spine.    IMPRESSION: Patchy opacities in the left upper lobe consistent with   pneumonia are increased from prior.        < end of copied text >    LABS:	 	                                7.7    22.63 )-----------( 397      ( 23 Feb 2018 05:30 )             23.7     02-23    140  |  103  |  63<H>  ----------------------------<  152<H>  4.3   |  21<L>  |  1.57<H>    Ca    9.6      23 Feb 2018 05:30  Phos  3.1     02-22  Mg     1.5     02-23
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 as per Pt on ASA Plavix  History of Colonoscopy with Polypectomy  History of Hysterectomy  Diverticulosis of Colon  Breast Cancer: S/P Lt lumpectomy &amp; RT  Dyslipidemia  DM (Diabetes Mellitus)  HTN (Hypertension)  COPD (Chronic Obstructive Pulmonary Disease)  History of tonsillectomy  S/P lumpectomy, left breast: 1998  H/O total hysterectomy: 1999      FAMILY HISTORY:  No pertinent family history in first degree relatives      REVIEW OF SYSTEMS:  CONSTITUTIONAL: No fever, weight loss, or fatigue  EYES: No eye pain, visual disturbances, or discharge  NECK: No pain or stiffness  RESPIRATORY: No cough or wheezing, no shortness of breath  CARDIOVASCULAR: No chest pain, + palpitations, no dizziness, or leg swelling  GASTROINTESTINAL: No abdominal or epigastric pain. No nausea, vomiting, diarrhea or constipation  GENITOURINARY: No dysuria, urinary frequency or urgency, no hematuria  NEUROLOGICAL: No headaches, memory loss, loss of strength, numbness, or tremors  SKIN: No itching, burning, rashes, or lesions   MUSCULOSKELETAL: No joint pain or swelling; No muscle, back, or extremity pain      Medications:  MEDICATIONS  (STANDING):  amLODIPine   Tablet 10 milliGRAM(s) Oral daily  aspirin enteric coated 81 milliGRAM(s) Oral daily  atorvastatin 20 milliGRAM(s) Oral at bedtime  buDESOnide 160 MICROgram(s)/formoterol 4.5 MICROgram(s) Inhaler 2 Puff(s) Inhalation two times a day  cholecalciferol 1000 Unit(s) Oral daily  cinacalcet 30 milliGRAM(s) Oral <User Schedule>  clopidogrel Tablet 75 milliGRAM(s) Oral daily  ferrous    sulfate 325 milliGRAM(s) Oral daily  furosemide    Tablet 20 milliGRAM(s) Oral daily  heparin  Injectable 5000 Unit(s) SubCutaneous every 12 hours  hydrALAZINE 25 milliGRAM(s) Oral four times a day  loratadine 10 milliGRAM(s) Oral daily  melatonin 3 milliGRAM(s) Oral at bedtime  metoprolol     tartrate 75 milliGRAM(s) Oral two times a day  multivitamin 1 Tablet(s) Oral daily  pantoprazole    Tablet 40 milliGRAM(s) Oral before breakfast  predniSONE   Tablet 40 milliGRAM(s) Oral daily  sertraline 100 milliGRAM(s) Oral daily    MEDICATIONS  (PRN):  acetaminophen   Tablet. 650 milliGRAM(s) Oral every 6 hours PRN mild or Moderate Pain (4 - 6)  ALBUTerol    90 MICROgram(s) HFA Inhaler 2 Puff(s) Inhalation every 6 hours PRN Bronchospasm  benzocaine 15 mG/menthol 3.6 mG Lozenge 1 Lozenge Oral every 4 hours PRN Sore Throat    	    PHYSICAL EXAM:  T(C): 37.1 (02-17-18 @ 06:09), Max: 37.1 (02-17-18 @ 06:09)  HR: 57 (02-17-18 @ 06:09) (57 - 76)  BP: 158/52 (02-17-18 @ 06:09) (157/40 - 158/56)  RR: 18 (02-17-18 @ 06:09) (17 - 18)  SpO2: 99% (02-17-18 @ 06:09) (99% - 99%)  Wt(kg): --  I&O's Summary    16 Feb 2018 07:01  -  17 Feb 2018 07:00  --------------------------------------------------------  IN: 630 mL / OUT: 0 mL / NET: 630 mL      Appearance: Normal	  HEENT:   NCAT, PERRL, EOMI	  Lymphatic: No lymphadenopathy  Cardiovascular: Normal S1 S2, RRR  Respiratory: Lungs clear to auscultation BL  Psychiatry: A & O x 3, Mood & affect appropriate  Gastrointestinal:  Soft, Non-tender, + BS  Skin: No rashes, No ecchymoses, No cyanosis	  Neurologic: Non-focal  Extremities: Normal range of motion, No clubbing, cyanosis or edema    LABS:	 	    CARDIAC MARKERS:                                8.5    15.75 )-----------( 388      ( 17 Feb 2018 06:30 )             24.7     02-17    141  |  104  |  56<H>  ----------------------------<  103<H>  4.0   |  22  |  1.67<H>    Ca    9.9      17 Feb 2018 06:30  Mg     1.8     02-17      proBNP:   Lipid Profile:   HgA1c:   TSH:
[facility] NEPHROLOGY- PROGRESS NOTE    84 female with history of CHF presents with SOB. Nephrology consulted for proteinuria.    REVIEW OF SYSTEMS:  Gen: no changes in weight  Cards: no chest pain  Resp: +dyspnea on exertion  GI: no nausea or vomiting or diarrhea  : pt c/o decreased urination  Vascular: no LE edema      Hospital Medications: Medications reviewed    VITALS:  T(F): 97.8 (18 @ 05:13), Max: 97.8 (18 @ 22:19)  HR: 68 (18 @ 05:13)  BP: 163/50 (18 @ 05:13)  RR: 18 (18 @ 05:13)  SpO2: 98% (18 @ 05:13)  Wt(kg): --     @ 07:01  -   @ 07:00  --------------------------------------------------------  IN: 250 mL / OUT: 450 mL / NET: -200 mL      PHYSICAL EXAM:    Gen: NAD, calm  Cards: RRR, +S1/S2, + BENNIE  Resp: CTA B/L  GI: soft, NT/ND, NABS  Vascular: no LE edema B/L      LABS:      141  |  105  |  53<H>  ----------------------------<  129<H>  4.4   |  22  |  1.50<H>    Ca    9.5      2018 06:51  Mg     1.6           Creatinine Trend: 1.50 <--, 1.58 <--, 1.67 <--, 1.53 <--, 1.40 <--, 1.21 <--, 1.04 <--                        8.1    18.05 )-----------( 387      ( 2018 06:51 )             24.8     Urine Studies:  Urinalysis Basic - ( 2018 16:04 )    Color: PLYEL / Appearance: CLEAR / S.013 / pH: 6.5  Gluc: NEGATIVE / Ketone: NEGATIVE  / Bili: NEGATIVE / Urobili: NORMAL mg/dL   Blood: NEGATIVE / Protein: 300 mg/dL / Nitrite: NEGATIVE   Leuk Esterase: NEGATIVE / RBC: 0-2 / WBC 2-5   Sq Epi: OCC / Non Sq Epi:  / Bacteria: FEW      Protein, Random Urine: 45.7 mg/dL ( @ 19:25)  Creatinine, Random Urine: 43.37 mg/dL ( @ 19:25)
[facility] NEPHROLOGY- PROGRESS NOTE    84 female with history of CHF presents with SOB. Nephrology consulted for proteinuria.    REVIEW OF SYSTEMS:  Gen: no changes in weight  Cards: no chest pain  Resp: + dyspnea on exertion  GI: no nausea or vomiting or diarrhea  Vascular: no LE edema      Hospital Medications: Medications reviewed    VITALS:  T(F): 98.4 (02-22-18 @ 06:37), Max: 98.4 (02-22-18 @ 06:37)  HR: 65 (02-22-18 @ 07:52)  BP: 144/62 (02-22-18 @ 07:52)  RR: 18 (02-22-18 @ 06:37)  SpO2: 98% (02-22-18 @ 06:37)  Wt(kg): --    02-21 @ 07:01  -  02-22 @ 07:00  --------------------------------------------------------  IN: 0 mL / OUT: 750 mL / NET: -750 mL    PHYSICAL EXAM:    Gen: NAD, calm  Cards: RRR, +S1/S2, + BENNIE  Resp: CTA B/L  GI: soft, NT/ND, NABS  Vascular: no LE edema B/L      LABS:  02-22    140  |  102  |  59<H>  ----------------------------<  129<H>  4.7   |  18<L>  |  1.71<H>    Ca    9.7      22 Feb 2018 06:22  Phos  3.1     02-22  Mg     1.6     02-22      Creatinine Trend: 1.71 <--, 1.77 <--, 1.79 <--, 1.50 <--, 1.58 <--, 1.67 <--, 1.53 <--                        7.7    26.33 )-----------( 424      ( 22 Feb 2018 06:22 )             23.9
[facility] NEPHROLOGY- PROGRESS NOTE    84 female with history of CHF presents with SOB. Nephrology consulted for proteinuria.    REVIEW OF SYSTEMS:  Gen: no changes in weight  Cards: no chest pain  Resp: no dyspnea  GI: no nausea or vomiting or diarrhea  Vascular: no LE edema    lisinopril (Anaphylaxis)  penicillin (Anaphylaxis)      Hospital Medications: Medications reviewed    VITALS:  T(F): 98.3 (02-15-18 @ 06:09), Max: 98.9 (18 @ 18:12)  HR: 65 (02-15-18 @ 13:00)  BP: 154/45 (02-15-18 @ 13:00)  RR: 18 (02-15-18 @ 13:00)  SpO2: 100% (02-15-18 @ 13:00)  Wt(kg): --  Height (cm): 152.4 ( @ 03:00)  Weight (kg): 55.1 ( @ 03:00)  BMI (kg/m2): 23.7 ( 03:00)  BSA (m2): 1.51 ( 03:00)     @ 07:01  -  02-15 @ 07:00  --------------------------------------------------------  IN: 500 mL / OUT: 550 mL / NET: -50 mL    02-15 @ 07:01  -  02-15 @ 14:48  --------------------------------------------------------  IN: 450 mL / OUT: 0 mL / NET: 450 mL        PHYSICAL EXAM:    Gen: NAD, calm  Cards: RRR, +S1/S2, + BENNIE  Resp: CTA B/L  GI: soft, NT/ND, NABS  Vascular: no LE edema B/L    LABS:  02-15    142  |  103  |  28<H>  ----------------------------<  89  3.9   |  22  |  1.40<H>    Ca    9.1      15 Feb 2018 06:30  Phos  3.0     02-14  Mg     1.5     02-15    TPro  6.6  /  Alb  3.5  /  TBili  0.6  /  DBili      /  AST  19  /  ALT  22  /  AlkPhos  106  02-13    Creatinine Trend: 1.40 <--, 1.21 <--, 1.04 <--                        9.1    8.99  )-----------( 326      ( 15 Feb 2018 06:30 )             26.4     Urine Studies:  Urinalysis Basic - ( 2018 16:04 )    Color: PLYEL / Appearance: CLEAR / S.013 / pH: 6.5  Gluc: NEGATIVE / Ketone: NEGATIVE  / Bili: NEGATIVE / Urobili: NORMAL mg/dL   Blood: NEGATIVE / Protein: 300 mg/dL / Nitrite: NEGATIVE   Leuk Esterase: NEGATIVE / RBC: 0-2 / WBC 2-5   Sq Epi: OCC / Non Sq Epi:  / Bacteria: FEW      Protein, Random Urine: 45.7 mg/dL ( @ 19:25)  Creatinine, Random Urine: 43.37 mg/dL ( @ 19:25)      < from: CT Chest No Cont (18 @ 17:51) >  IMPRESSION:     1.  Emphysema. Diffuse bilateral groundglass opacities, which are   persistent but improved relative to 2014 chest CT. The differential for   this finding is broad. In the acute setting this may be from infection or   edema. However drug reaction, hypersensitivity pneumonitis, and pulmonary   siderosis can also have this appearance.    2.  Small right pleural effusion with passive atelectasis.    < end of copied text >

## 2018-02-25 NOTE — PROGRESS NOTE ADULT - PROBLEM SELECTOR PLAN 3
Sub-nephrotic range suspect due to DM as patient admits to h/o DM for 20 years which has resolved after significant weight loss. Paraproteinemia work up neg: SPEP abnormal with hypoalbuminemia. SIFE neg. Will restart losartan once renal function returns to baseline.
Sub-nephrotic range suspect due to DM as patient admits to h/o DM for 20 years which has resolved after significant weight loss. Will restart losartan once renal function returns to baseline.
Sub-nephrotic range suspect due to DM as patient admits to h/o DM for 20 years which has resolved after significant weight loss. Follow up paraproteinemia work up to rule out alternative secondary etiologies. Continue with losartan for anti-proteinuric effect for now but will discontinue if Scr continues to rise.
? from steroid usage (less likely as off steroids)  CXR shows left PNA  monitor CBC
? from steroid usage (less likely as off steroids)  procalcitonin Pending  CXR shows left PNA  monitor CBC
CXR shows left PNA  monitor CBC
Cont IV lasix: right sided small pleural effusion could be secondary to CHF exacerbation  2/16: the ct scan may not be suggestive of CHF:
Cont IV lasix: right sided small pleural effusion could be secondary to CHF exacerbation  2/16: the ct scan may not be suggestive of CHF:  2/17 Monitor I&O, diuretics
Cont IV lasix: right sided small pleural effusion could be secondary to CHF exacerbation  2/16: the ct scan may not be suggestive of CHF:  2/17 Monitor I&O, diuretics  2/18 Monitor I&O, diuretic s
Cont IV lasix: right sided small pleural effusion could be secondary to CHF exacerbation  2/16: the ct scan may not be suggestive of CHF:  2/17 Monitor I&O, diuretics  2/18 Monitor I&O, diuretic s  2/19: feels ok : on 20 mg of lasix:
Sub-nephrotic range suspect due to DM as patient admits to h/o DM for 20 years which has resolved after significant weight loss. Follow up paraproteinemia work up to rule out alternative secondary etiologies (SPEP and serum PERAL). Will restart losartan once renal function returns to baseline.
Sub-nephrotic range suspect due to DM as patient admits to h/o DM for 20 years which has resolved after significant weight loss. Follow up paraproteinemia work up to rule out alternative secondary etiologies (SPEP and serum PERLA). Will restart losartan once renal function returns to baseline.
Sub-nephrotic range suspect due to DM as patient admits to h/o DM for 20 years which has resolved after significant weight loss. Paraproteinemia work up neg: SPEP abnormal with hypoalbuminemia. SIFE neg. Will restart losartan once renal function returns to baseline.
Sub-nephrotic range suspect due to DM as patient admits to h/o DM for 20 years which has resolved after significant weight loss. Paraproteinemia work up neg: SPEP abnormal with hypoalbuminemia. SIFE neg. Will restart losartan once renal function returns to baseline.
Sub-nephrotic range suspect due to DM as patient admits to h/o DM for 20 years which has resolved after significant weight loss. Will restart losartan once renal function returns to baseline.
defer to renal
will need out pt follow up Dr. Michael.
CXR shows left PNA  monitor CBC
likely due to PNA  on broad spectrum abx  f/u cultures  check Procalcitonin in am.

## 2018-02-25 NOTE — PROGRESS NOTE ADULT - PROBLEM SELECTOR PROBLEM 1
Acute kidney injury
Abnormal chest CT
Acute kidney injury
COPD (Chronic Obstructive Pulmonary Disease)
Congestive heart failure (CHF)
Acute kidney injury
Acute kidney injury
Congestive heart failure (CHF)
Acute respiratory failure with hypoxia

## 2018-02-25 NOTE — PROGRESS NOTE ADULT - PROBLEM SELECTOR PLAN 5
Resolved. Pt on lasix 20mg PO daily. Monitor UO.
Resolved. Continue with lasix to 20 mg every other day. Suspect dyspnea not from volume overload. Monitor UO.
Improving. Can decrease lasix to daily if ok with cardiology. Monitor UO.
Controlled
Improving. Agree with changing lasix to 40 mg PO daily. Monitor UO.
Resolved. Agree with decreasing lasix to 20 mg every other day as patient appears dry. Monitor UO.
Resolved. Consider holding lasix in the setting of worsening renal function.  Monitor UO.
Resolved. Continue with lasix to 20 mg every other day. Suspect dyspnea not from volume overload. Monitor UO.
Resolved. Continue with lasix to 20 mg every other day. Suspect dyspnea not from volume overload. Monitor UO.
Resolved. Patient appears dry. Ok to continue with lasix to 20 mg every other day for now. Suspect dyspnea not from volume overload. Monitor UO.
Resolved. Patient appears dry. Would discontinue lasix if ok with cardiology. Suspect dyspnea not from volume overload. Monitor UO.
Resolved. Pt on lasix 20mg PO daily. Monitor UO.
cont home meds
cont home meds  2/17 Lipitor
cont home meds  2/17 Lipitor  2/18 on Statin
cont home meds  2/17 Lipitor  2/18 on Statin
no wheezing  COPD seen on CT  need out patient PFT    buDESOnide 160 MICROgram(s)/formoterol 4.5 MICROgram(s) Inhaler 2 Puff(s) Inhalation two times a day  ALBUTerol    90 MICROgram(s) HFA Inhaler 2 Puff(s) Inhalation every 6 hours PRN Bronchospasm
will need out pt follow up Dr. Michael.
no wheezing  COPD seen on CT  need out patient PFT when stable  on -  Symbicort and albuterol

## 2018-02-25 NOTE — PROGRESS NOTE ADULT - ASSESSMENT
85 y/o F with h/o COPD on 3L of O2 at home, HTN, CHF, DM, HLD, diverticulosis, breast ca s/p lumpectomy and RT, presents to the ED for urinary retention X 5 days and shortness of breath.  UA and urine cultures negative.  Pt with new BABS infiltrate.    Problem/Plan - 1:    ·	HCAP    - CT chest with multifocal pna.  Pt on tigecycline/aztreoneam for double gram negative coverage and MRSA, azithromycin and micafungin.   s/p one dose gentamicin yesterday    - f/u sputum cultures,  aspergillus galactomannin, Fungitell, induced sputum for cultures and AFB (not done as patient in respiratory distress), cryp Ag (negative).    - Check blood cultures x 2      Will follow,    Tish Almanza  [phone]

## 2018-02-25 NOTE — PROGRESS NOTE ADULT - PROBLEM SELECTOR PLAN 4
BP elevated. Recc to increase Hydralazine to 75mg PO tid, can titrate up as needed. Monitor BP.
BP improving. Can titrate hydralazine as needed. Monitor BP.
BP uncontrolled. Can change metoprolol to coreg for better anti-hypertensive effect. Monitor BP.
BP borderline. Continue with current medications and low sodium diet. Monitor BP.
BP elevated for which hydralazine recently increased and imdur added. Monitor BP.
BP elevated for which plan to increase imdur as per cardiology. Monitor BP.
BP elevated. Consider increasing Hydralazine from 25mg PO qid to 50mg PO tid. Monitor BP.
BP elevated. Hydralazine increased to 50mg PO tid today, can titrate up as needed. Monitor BP.
BP improving. Continue with current medications and low sodium diet. Monitor BP.
BP uncontrolled for which imdur increased today as per cardiology. Monitor BP.
BP uncontrolled. Metoprolol increased today as per cardiology. Monitor BP.
cont home meds
defer to renal
defer to renal  2/17 defer to primary
defer to renal  2/17 defer to primary  2/18 defer to primary
defer to renal  2/17 defer to primary  2/18 defer to primary
no wheezing  COPD seen on CT  need out patient PFT    buDESOnide 160 MICROgram(s)/formoterol 4.5 MICROgram(s) Inhaler 2 Puff(s) Inhalation two times a day  ALBUTerol    90 MICROgram(s) HFA Inhaler 2 Puff(s) Inhalation every 6 hours PRN Bronchospasm
no wheezing  COPD seen on CT  need out patient PFT  on -  Symbicort and albuterol
no wheezing  COPD seen on CT  need out patient PFT  on -  Symbicort and albuterol
out patient follow up with repeat CT. in 4-6 months.
no wheezing  COPD seen on CT  need out patient PFT  on -  Symbicort and albuterol
resolved Acute on chronic Diastolic CHF.  diuresis as per primary team

## 2018-02-25 NOTE — AIRWAY PLACEMENT NOTE ADULT - AIRWAY COMMENTS:
Airway secured. No dental damage noted. Needle decompression performed by MICU prior to intubation. SpO2 improved to 92%, BP stable post intubation.

## 2018-02-25 NOTE — PROGRESS NOTE ADULT - RS GEN PE MLT RESP DETAILS PC
rales/no rhonchi/airway patent/no wheezes/good air movement
rales/airway patent/no subcutaneous emphysema/wheezes/no chest wall tenderness/breath sounds equal

## 2018-02-25 NOTE — PROGRESS NOTE ADULT - PROBLEM SELECTOR PROBLEM 2
CKD (chronic kidney disease), stage III
COPD (Chronic Obstructive Pulmonary Disease)
Congestive heart failure (CHF)
Leukocytosis, unspecified type
R/O Pneumonia of left upper lobe due to infectious organism
CKD (chronic kidney disease), stage III
CKD (chronic kidney disease), stage III
R/O Pneumonia of left upper lobe due to infectious organism
Multifocal pneumonia

## 2018-02-25 NOTE — PROGRESS NOTE ADULT - PROBLEM SELECTOR PLAN 1
Patient with PAM likely hemodynamically mediated due to diuretic therapy in setting of ARB use and infection. Scr increased today after lasix 40 mg IV given X 2 on 2/24. Given hemoptysis, check UA (to assess for microscopic hematuria), C3, C4, dsDNA, MARV, ANCA, anti-GBM ab and ASLO. Continue to hold losartan and can restart once renal function improves. Avoid nephrotoxins.

## 2018-02-25 NOTE — PROGRESS NOTE ADULT - PROBLEM SELECTOR PROBLEM 8
Need for prophylactic measure
Diarrhea, unspecified type

## 2018-02-25 NOTE — AIRWAY PLACEMENT NOTE ADULT - POST AIRWAY PLACEMENT ASSESSMENT:
positive end tidal CO2 noted/CXR pending/breath sounds left greater than right 2/2 known pneumothorax/chest excursion noted

## 2018-02-25 NOTE — DISCHARGE NOTE FOR THE EXPIRED PATIENT - HOSPITAL COURSE
Patient is a  84 year old female who was admitted for  dyspnea on 18. During hospital stay patient was treated for  multifocal pneumonia, acute on chronic CHF, severe AS, emphysema, PAM on CKD, new onset AFib, HCAP.  Patient was being followed by pulmonologist during hospital stay. Patient had placed on IV antibiotics of Azithromycin, Aztreonam, Vancomycin, Micafungin. Patient had received blood transfusion for anemia during admission. CXR showed pulmonary edema with bilateral pleural effusions. Echocardiogram  with EF 69%, normal LV systolic function, normal RV systolic function. CT chest performed 18 showed resolving pulmonary edema . Patient continued to have dyspnea and follow up CT scan was done 18. Ct scan showed extensive bilateral opacities c/w multifocal pneumonia. On 18 pulmonologist placed patient on high flow oxygen to improve oxygenation. Later that day patient began to desaturate to 885. rapid response was called. CXR performed at the time showed right side pneumothorax. Patient remained hypoxic requiring urgent intubation. Emergent chest tube was placed right side. Shortly there after patient started to become bradycardic. Patient then became pulseless, CPR started and ACLS protocol initiated. Patient remained in PEA and bradycardic rhythms. Patient never regained spontaneous circulation.. Patient  at 18:58. Patient is a  84 year old female who was admitted for  dyspnea on 18. During hospital stay patient was treated for  multifocal pneumonia, acute on chronic CHF, severe AS, emphysema, PAM on CKD, new onset AFib, HCAP.  Patient was being followed by pulmonologist during hospital stay. Patient had placed on IV antibiotics of Azithromycin, Aztreonam, Vancomycin, Micafungin. Patient had received blood transfusion for anemia during admission. CXR showed pulmonary edema with bilateral pleural effusions. Echocardiogram  with EF 69%, normal LV systolic function, normal RV systolic function. CT chest performed 18 showed resolving pulmonary edema . Patient continued to have dyspnea and follow up CT scan was done 18. Ct scan showed extensive bilateral opacities c/w multifocal pneumonia. On 18 pulmonologist placed patient on high flow oxygen to improve oxygenation. Later that day patient began to desaturate to 85. rapid response was called. CXR performed at the time showed right side pneumothorax. Patient remained hypoxic requiring urgent intubation. Emergent chest tube was placed right side. Shortly there after patient started to become bradycardic. Patient then became pulseless, CPR started and ACLS protocol initiated. Patient remained in PEA and bradycardic rhythms. Patient never regained spontaneous circulation. Patient  at 18:58.

## 2018-02-25 NOTE — PROGRESS NOTE ADULT - PROBLEM SELECTOR PROBLEM 5
Edema, lower extremity
Abnormal chest CT
COPD (Chronic Obstructive Pulmonary Disease)
Dyslipidemia
Edema, lower extremity
HTN (Hypertension)
Abnormal chest CT
COPD (Chronic Obstructive Pulmonary Disease)

## 2018-02-25 NOTE — PROGRESS NOTE ADULT - PROBLEM SELECTOR PROBLEM 7
Lung nodule
Need for prophylactic measure

## 2018-02-25 NOTE — PROGRESS NOTE ADULT - PROBLEM SELECTOR PLAN 8
DVT prophylaxis  Hep SQ
monitor, may need to r/o Cdiff   d/w Floor team
monitor, may need to r/o Cdiff   d/w Floor team
resolved
resolved

## 2018-02-25 NOTE — PROGRESS NOTE ADULT - ASSESSMENT
85 y/o F with h/o COPD on 3L of O2 at home, HTN, DCHF, DM, HLD, diverticulosis, breast ca s/p lumpectomy and RT, presents  with  urinary retention  and shortness of breath    1. Pna/Leukocytosis/Dyspnea  Repeat ct chest with increased infiltrate, multifocal pna  iv abx regimen increased with add of zithro, antifungals  micu eval appreaciated  appears more comfortable today   iv lasix given yesterday, ct with small b/l effusions  pulmo f/u   check bcx  r/o endocarditis    2. acute on chronic DCHF  s/p iv lasix yesterday   will hold iv today and use prn  cont po regimen   echo with lv intracav gradient without sig AS, normal LV sys fx   no evidence of ACS   continue with BB     3.  Mild AS/  Moderate aortic regurgitation.   ontinue with diuretics  continue with metoprolol      4. HTN  acceptable   cont hydralazine, norvasc, metoprolol , imdur as ordered     5. PAM   renal f.u   stable creat     6.   COPD   s/p po steroids     7. anemia   prbc x 1 2/24  heme f/u  trend cbc    8. AF  cont rate control   no a/c due to anemia   asa only

## 2018-02-25 NOTE — CHART NOTE - NSCHARTNOTEFT_GEN_A_CORE
Rapid Response  called. Nurse states that patient had a change in breathing pattern. Patient had been started on high flow oxygen earlier today under pulmonologists recommendations.  Nurse states that patient suddenly had a decrease in oxygen saturation to 88%.  Patient currently being treated for multifocal pneumonia, acute on chronic CHF, emphysema, PAM, anemia.  Upon arrival patient lethargic, use of abdominal muscles to breathe.  with high flow oxygen in place.   /41. P 67  T97.6 R 38 O2 sat 88%  heart- reg rate  Lungs - decreased breath sounds right, scattered rhonchi  abd - use of accessory muscles to breathe  extrem - no edema or cyanosis   CXR- right side pneumothorax   A- Respiratory distress due to pneumothorax possible tension pneumothorax  P- stat CXR, ABG      right side pneumothorax - stat chest tube placed by surgery resident      patient intubated by anesthesia - post CXR      DR Holliday cardiologist called and updated      Dr Justice pulmonologist called and updated      Call out to patient's family. await return call      MICU team in to see patient. Patient to transfer to ICU for continued care.

## 2018-02-25 NOTE — PROGRESS NOTE ADULT - ASSESSMENT
85 y/o Female with h/o COPD, CHF, HTN, HLD, DM, breast Ca s/p left lumpectomy and RT, diverticulosis presents to the ED for shortness of breath:  1. SOB - Acute on chronic diast CHF, now appears euvolemic,   RUL PNA, f/u ct noted  micu eval noted/rejected from micu   c/w abs as per id  2.  HLD - c/w statin  3. HTN - cont meds    4. COPD - Pulm f/u  cont rep support    5. DVT prophylaxis  6. Anemia - /heme eval noted  7. PAM - on likely ckd  renal f/u  8. Palpitations - NSVT, EPS apreciated, c/w BB  9. Leukocytosis - likely 2/2 PNA/steroids/plus reactive component     oob  prognosis guarded

## 2018-02-25 NOTE — PROGRESS NOTE ADULT - ASSESSMENT
83 y/o F with h/o COPD, CHF, HTN, HLD, DM, breast Ca s/p left lumpectomy and RT, diverticulosis presents to the ED for shortness of breath.  Initially treated for chf exacerbation, now with multifocal pna.

## 2018-02-25 NOTE — CHART NOTE - NSCHARTNOTEFT_GEN_A_CORE
CT chest results noted - pt with multifocal pneumonia.   Will add tigecycline for added gram negative coverage.  Pt received one dose of gentamycin yesterday.  Tigecycline will also cover for MRSA, can d/c vancomycin.  Cont to monitor, f/u cultures.

## 2018-02-25 NOTE — PROGRESS NOTE ADULT - PROBLEM SELECTOR PROBLEM 6
HTN (Hypertension)
Lung nodule
Need for prophylactic measure
Need for prophylactic measure
Lung nodule
Abnormal chest CT

## 2018-02-25 NOTE — PROGRESS NOTE ADULT - PROVIDER SPECIALTY LIST ADULT
Cardiology
Electrophysiology
Heme/Onc
Infectious Disease
Internal Medicine
Nephrology
Pulmonology
Cardiology
Electrophysiology
Internal Medicine
Cardiology
Infectious Disease
Internal Medicine
Internal Medicine
Nephrology
Pulmonology

## 2018-02-25 NOTE — CHART NOTE - NSCHARTNOTEFT_GEN_A_CORE
MAR RRT / Code Blue note    Called to the bedside at 17:21 PM to evaluate patient for shortness of breath; RRT called by nursing staff. Patient was noted to be desatting on the monitor, in respiratory distress. Patient had been undergoing treatment for multifocal PNA, acute on chronic CHF (diuretics stopped the day prior), emphysema, PAM, anemia; primary team also reported that patient had underlying COPD.    On initial assessment, patient was in severe repistoary distress, belly breathing; had decreased lung sounds on the right and ronchorous on the left. Was hooked up the high flow nasal canula. O2Sat was in 88% to 90% initially. Suspected possible COPD exacerbation, ordered for Duonebs; also given IV magnesium as it was low in the morning. CXR was expedited, showed right-sided pneumothorax. MICU and surgery were urgently consulted to the bedside for decompression, concern for tension pneumothorax (BP was 120/40,  HR 52 Sat 77% at 17:45). Anesthesia was called to the bedside to be on standby for intubation after chest tube placement.     Patient underwent right anterior needle decompression of tension pneumothorax by MICU team, bedside ultrasound show improvement of pneumothorax; patient was then intubated, received 5 mL etomidate and 5 mL of succ at 18:02 via the 2 IOs were placed by the MICU PA for access in anticipation of needing pressor support.  Surgery then placed chest tube under sterile conditions; confirmed that system had adequate suction; besides air, noted to also have translucent, off-white pleural fluid within the suction system. At 18:07, the patient'sVS were 120/44, HR 69, O2 92%; she was given 50 mL propofol for sedation via IO at 18:12; at 18:14, she was started on 0.05 mg/kg of levophed via IO; at 18:16, her blood pressure was noted to be lower to 107/39, HR 65 with a stable sat at 90%. Chest xray was obtained which showed decreased pneumothorax size.    Patient was to be transferred to ICU, but before she was able to be transported, she started becoming more hypotensive at 17:58; started having intermittent bradycardia on the monitor to 40s-50s; given atropine with momentary improvement; levophed dose incrementally increased without significant improvement. At 18:36 PM, patient's pulse was lost, had PEA arrest, chest compressions started, given 3 doses of epinephrine with ROSC at 18:39, but unable to obtain reliable blood pressure despite adequate cardiac activity on bedside ultrasound. Patient again became pulseless at 18:40, chest compressions restarted; patient was given 4 additional rounds of epinephrine, calcium chloride, bicarb; during pulse checks, noted to have no diastolic filling or cardiac activity, remained pulseless despite resuscitative efforts. Code blue was ended at 18:58, pronounced dead at 18:58, cause of death 2/2 complications from pneumothorax, cardiopulmonary arrest.    Patient's family was notified by primary team telemetry PA; attending was notified by telemetry PA.        MD DEVORAH Middleton, PGY3  [id number] / p [id number]

## 2018-02-25 NOTE — PROGRESS NOTE ADULT - PROBLEM SELECTOR PLAN 2
Patient with CKD-3 which appears age appropriate. Monitor electrolytes.
? from steroid usage  check procalcitonin  no gross infiltrate on CT noted/cxr   monitor CBC  can consider ID eval
CT scan reviewed: has emphysema: as well as small effusion on the right side: Add steroids for a few day s:
CT scan reviewed: has emphysema: as well as small effusion on the right side: Add steroids for a few day s:  2/17 Prednisone, Symbicort, bronchodilator
CT scan reviewed: has emphysema: as well as small effusion on the right side: Add steroids for a few day s:  2/17 Prednisone, Symbicort, bronchodilator  2/18 stable with current management. Wean supplemental O2 to keep O2 sat greater than 88%
CT scan reviewed: has emphysema: as well as small effusion on the right side: Add steroids for a few day s:  2/17 Prednisone, Symbicort, bronchodilator  2/18 stable with current management. Wean supplemental O2 to keep O2 sat greater than 88%  2/19: no wheezing:
Cont IV lasix: right sided small pleural effusion could be secondary to CHF exacerbation
Patient with CKD-3 which appears age appropriate. Monitor electrolytes.
Patient with CKD-3 which appears age appropriate. Patient c/o inability to void however bladder scan with only 259 ml of urine and unclear if PVR. Monitor electrolytes.
on empiric abx with Aztreonam vanco  repeat chest xr in am   Procalcitonin, Serum 0.29  WBC improving today  appreciate ID eval
start empiric abx with Aztreonam vanco  repeat chest xr in am  Procalcitonin, Serum 0.29  WBC increasing - despite abx, consider ID Eval
start empiric abx with cefepime vanco  consider ID Eval
Patient with CKD-3 which appears age appropriate. Monitor electrolytes.
Patient with CKD-3 which appears age appropriate. Patient c/o inability to void however bladder scan with only 259 ml of urine and unclear if PVR. Monitor electrolytes.
on empiric abx as per ID  repeat cxr and follow up results.  continue n/c o2 for hypoxia.
on broad spectrum abx  unsafe for bronch as pt hypoxic  attempt sputum cx

## 2018-02-27 LAB
BACTERIA SPT RESP CULT: SIGNIFICANT CHANGE UP
GALACTOMANNAN AG SERPL-ACNC: <0.5 — SIGNIFICANT CHANGE UP
GRAM STN SPT: SIGNIFICANT CHANGE UP

## 2018-02-28 NOTE — PATIENT PROFILE ADULT. - MEDICATION ADMINISTRATION INFO, PROFILE
She was seen in the office on yesterday and was told to contact Dr. Morrison about her constipation. Dr. Morrison informed her to take Miralax she pretty much drank the whole bottle but still not able to have a bowel movement. She is in a lot of pain and her stomach is really bloated   no concerns

## 2018-03-01 LAB
BACTERIA BLD CULT: SIGNIFICANT CHANGE UP
BACTERIA BLD CULT: SIGNIFICANT CHANGE UP

## 2020-09-23 NOTE — PROGRESS NOTE ADULT - PROBLEM/PLAN-5
tele
DISPLAY PLAN FREE TEXT

## 2021-11-11 NOTE — H&P ADULT - HISTORY OF PRESENT ILLNESS
85 y/o F with h/o COPD on 3L of O2 at home, HTN, CHF, DM, HLD, diverticulosis, breast ca s/p lumpectomy and RT, presents to the ED for urinary retention X 5 days and shortness of breath. Pt states she has been dribbling her urine for the past 5 days. Pt states she has been going to urinate more often. Pt also states she has shortness of breath with exertion. Pt states she had to decrease her lasix dose due to worsening renal function. Pt denies LOC, syncope,fever chillls, N/V/D/C, numbness, tingling, chest pain, palpitations, lightheadness, dizziness, dysuria, urinary/bowel incontinence or any other complaints at this time. Repair Type: Complex

## 2022-01-07 NOTE — ED PROVIDER NOTE - CROS ED CONS ALL NEG
Refill policies:    • Allow 2-3 business days for refills; controlled substances may take longer.   • Contact your pharmacy at least 5 days prior to running out of medication and have them send an electronic request or submit request through the “request re Depending on your insurance carrier, approval may take 3-10 days. It is highly recommended patients contact their insurance carrier directly to determine coverage.   If test is done without insurance authorization, patient may be responsible for the entire negative...

## 2025-03-29 NOTE — H&P ADULT - NSHPLANGLIMITEDENGLISH_GEN_A_CORE
Pt arrived in the ED via [facility] from home with c/o pain between shoulder blades, numbness to left arm and left-sided neck, lightheadedness this morning.  Pt did take her normal medications.  Rates pain 4/10 at this time.  Pt is awake, alert, and oriented x4; ambulatory with steady gait.  
No